# Patient Record
Sex: MALE | Race: WHITE | NOT HISPANIC OR LATINO | Employment: OTHER | URBAN - METROPOLITAN AREA
[De-identification: names, ages, dates, MRNs, and addresses within clinical notes are randomized per-mention and may not be internally consistent; named-entity substitution may affect disease eponyms.]

---

## 2017-01-25 ENCOUNTER — ALLSCRIPTS OFFICE VISIT (OUTPATIENT)
Dept: OTHER | Facility: OTHER | Age: 52
End: 2017-01-25

## 2017-06-08 ENCOUNTER — APPOINTMENT (EMERGENCY)
Dept: CT IMAGING | Facility: HOSPITAL | Age: 52
End: 2017-06-08
Payer: COMMERCIAL

## 2017-06-08 ENCOUNTER — HOSPITAL ENCOUNTER (EMERGENCY)
Facility: HOSPITAL | Age: 52
Discharge: HOME | End: 2017-06-08
Attending: EMERGENCY MEDICINE
Payer: COMMERCIAL

## 2017-06-08 ENCOUNTER — APPOINTMENT (EMERGENCY)
Dept: RADIOLOGY | Facility: HOSPITAL | Age: 52
End: 2017-06-08
Payer: COMMERCIAL

## 2017-06-08 VITALS
TEMPERATURE: 98.3 F | OXYGEN SATURATION: 98 % | DIASTOLIC BLOOD PRESSURE: 94 MMHG | WEIGHT: 213.19 LBS | BODY MASS INDEX: 28.88 KG/M2 | RESPIRATION RATE: 17 BRPM | HEART RATE: 96 BPM | SYSTOLIC BLOOD PRESSURE: 149 MMHG | HEIGHT: 72 IN

## 2017-06-08 DIAGNOSIS — S90.02XA CONTUSION OF LEFT ANKLE: ICD-10-CM

## 2017-06-08 DIAGNOSIS — S43.402A SPRAIN OF LEFT SHOULDER: ICD-10-CM
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
## 2017-06-08 DIAGNOSIS — S80.02XA CONTUSION OF LEFT KNEE: ICD-10-CM

## 2017-06-08 PROCEDURE — 73030 X-RAY EXAM OF SHOULDER: CPT

## 2017-06-08 PROCEDURE — 73564 X-RAY EXAM KNEE 4 OR MORE: CPT

## 2017-06-08 PROCEDURE — 99284 EMERGENCY DEPT VISIT MOD MDM: CPT

## 2017-06-08 PROCEDURE — 70450 CT HEAD/BRAIN W/O DYE: CPT

## 2017-06-08 PROCEDURE — 72125 CT NECK SPINE W/O DYE: CPT

## 2017-06-08 PROCEDURE — 73610 X-RAY EXAM OF ANKLE: CPT

## 2017-06-08 RX ORDER — LISINOPRIL AND HYDROCHLOROTHIAZIDE 12.5; 1 MG/1; MG/1
1 TABLET ORAL DAILY
COMMUNITY
End: 2019-07-02 | Stop reason: ALTCHOICE

## 2017-06-08 RX ORDER — TRAMADOL HYDROCHLORIDE 300 MG/1
300 TABLET, EXTENDED RELEASE ORAL DAILY
COMMUNITY

## 2017-06-08 RX ORDER — METAXALONE 800 MG/1
800 TABLET ORAL 3 TIMES DAILY
COMMUNITY

## 2017-06-08 RX ORDER — RABEPRAZOLE SODIUM 20 MG/1
20 TABLET, DELAYED RELEASE ORAL DAILY
COMMUNITY
End: 2019-07-02 | Stop reason: ALTCHOICE

## 2017-06-08 RX ORDER — ATORVASTATIN CALCIUM 10 MG/1
10 TABLET, FILM COATED ORAL DAILY
COMMUNITY
End: 2019-07-02 | Stop reason: SINTOL

## 2017-06-08 RX ORDER — ACETAMINOPHEN 325 MG/1
650 TABLET ORAL ONCE
Status: COMPLETED | OUTPATIENT
Start: 2017-06-08 | End: 2017-06-08

## 2017-06-08 RX ORDER — CELECOXIB 200 MG/1
200 CAPSULE ORAL DAILY
COMMUNITY

## 2017-06-08 RX ORDER — GABAPENTIN 600 MG/1
1200 TABLET ORAL 3 TIMES DAILY
COMMUNITY
End: 2018-03-15 | Stop reason: SDUPTHER

## 2017-06-08 RX ADMIN — ACETAMINOPHEN 650 MG: 325 TABLET ORAL at 10:14

## 2017-08-01 ENCOUNTER — TRANSCRIBE ORDERS (OUTPATIENT)
Dept: ADMINISTRATIVE | Facility: HOSPITAL | Age: 52
End: 2017-08-01

## 2017-08-01 DIAGNOSIS — R52 PAIN: Primary | ICD-10-CM

## 2017-08-10 ENCOUNTER — HOSPITAL ENCOUNTER (OUTPATIENT)
Dept: MRI IMAGING | Facility: HOSPITAL | Age: 52
Discharge: HOME | End: 2017-08-10
Attending: PHYSICAL MEDICINE & REHABILITATION
Payer: COMMERCIAL

## 2017-08-10 DIAGNOSIS — R52 PAIN: ICD-10-CM

## 2017-08-10 PROCEDURE — 73721 MRI JNT OF LWR EXTRE W/O DYE: CPT

## 2017-08-10 PROCEDURE — 70551 MRI BRAIN STEM W/O DYE: CPT

## 2018-01-12 VITALS
SYSTOLIC BLOOD PRESSURE: 122 MMHG | HEIGHT: 71 IN | HEART RATE: 80 BPM | BODY MASS INDEX: 26.18 KG/M2 | RESPIRATION RATE: 16 BRPM | WEIGHT: 187 LBS | DIASTOLIC BLOOD PRESSURE: 82 MMHG

## 2018-01-18 ENCOUNTER — GENERIC CONVERSION - ENCOUNTER (OUTPATIENT)
Dept: OTHER | Facility: OTHER | Age: 53
End: 2018-01-18

## 2018-01-19 ENCOUNTER — GENERIC CONVERSION - ENCOUNTER (OUTPATIENT)
Dept: NEUROLOGY | Facility: CLINIC | Age: 53
End: 2018-01-19

## 2018-01-24 VITALS
BODY MASS INDEX: 28.48 KG/M2 | HEART RATE: 92 BPM | SYSTOLIC BLOOD PRESSURE: 152 MMHG | WEIGHT: 210 LBS | DIASTOLIC BLOOD PRESSURE: 92 MMHG

## 2018-03-14 ENCOUNTER — TELEPHONE (OUTPATIENT)
Dept: NEUROLOGY | Facility: CLINIC | Age: 53
End: 2018-03-14

## 2018-03-14 NOTE — TELEPHONE ENCOUNTER
spoke to wife, asked her to have her  call to verify if we are now having gabapentin switched to [facility]

## 2018-03-15 DIAGNOSIS — G60.9 IDIOPATHIC PERIPHERAL NEUROPATHY: Primary | ICD-10-CM

## 2018-03-15 RX ORDER — GABAPENTIN 600 MG/1
TABLET ORAL
Qty: 540 TABLET | Refills: 1 | Status: SHIPPED | OUTPATIENT
Start: 2018-03-15 | End: 2018-07-16 | Stop reason: SDUPTHER

## 2018-07-16 DIAGNOSIS — G60.9 IDIOPATHIC PERIPHERAL NEUROPATHY: ICD-10-CM

## 2018-07-17 RX ORDER — GABAPENTIN 600 MG/1
TABLET ORAL
Qty: 540 TABLET | Refills: 3 | Status: SHIPPED | OUTPATIENT
Start: 2018-07-17 | End: 2019-06-11 | Stop reason: SDUPTHER

## 2019-06-11 DIAGNOSIS — G60.9 IDIOPATHIC PERIPHERAL NEUROPATHY: ICD-10-CM

## 2019-06-11 RX ORDER — GABAPENTIN 600 MG/1
TABLET ORAL
Qty: 540 TABLET | Refills: 3 | Status: SHIPPED | OUTPATIENT
Start: 2019-06-11 | End: 2019-07-02 | Stop reason: SDUPTHER

## 2019-06-11 RX ORDER — GABAPENTIN 600 MG/1
1200 TABLET ORAL 3 TIMES DAILY
Qty: 540 TABLET | Refills: 3 | Status: SHIPPED | OUTPATIENT
Start: 2019-06-11 | End: 2020-06-08

## 2019-07-02 ENCOUNTER — OFFICE VISIT (OUTPATIENT)
Dept: NEUROLOGY | Facility: CLINIC | Age: 54
End: 2019-07-02
Payer: COMMERCIAL

## 2019-07-02 VITALS
HEART RATE: 90 BPM | HEIGHT: 71 IN | DIASTOLIC BLOOD PRESSURE: 84 MMHG | SYSTOLIC BLOOD PRESSURE: 130 MMHG | BODY MASS INDEX: 29.79 KG/M2 | WEIGHT: 212.8 LBS

## 2019-07-02 DIAGNOSIS — G60.9 IDIOPATHIC PERIPHERAL NEUROPATHY: Primary | ICD-10-CM

## 2019-07-02 PROCEDURE — 99213 OFFICE O/P EST LOW 20 MIN: CPT | Performed by: PSYCHIATRY & NEUROLOGY

## 2019-07-02 RX ORDER — AMITRIPTYLINE HYDROCHLORIDE 25 MG/1
25 TABLET, FILM COATED ORAL
COMMUNITY
End: 2020-03-31 | Stop reason: ALTCHOICE

## 2019-07-02 RX ORDER — FREMANEZUMAB-VFRM 225 MG/1.5ML
225 INJECTION SUBCUTANEOUS
COMMUNITY
Start: 2019-07-01

## 2019-07-02 RX ORDER — AMLODIPINE BESYLATE 2.5 MG/1
2.5 TABLET ORAL DAILY
COMMUNITY
End: 2022-03-22

## 2019-07-02 RX ORDER — OMEPRAZOLE 20 MG/1
1 CAPSULE, DELAYED RELEASE ORAL 2 TIMES DAILY
COMMUNITY
Start: 2019-06-23 | End: 2021-03-26

## 2019-07-02 RX ORDER — SOLIFENACIN SUCCINATE 5 MG/1
5 TABLET, FILM COATED ORAL DAILY
COMMUNITY

## 2019-07-02 RX ORDER — ROSUVASTATIN CALCIUM 5 MG/1
1 TABLET, COATED ORAL DAILY
COMMUNITY
Start: 2019-06-30

## 2019-07-02 NOTE — PROGRESS NOTES
Johanny Wyman is a 49 y o  male returns in follow-up today with history of peripheral neuropathy    Assessment:  1  Idiopathic peripheral neuropathy        Plan:  Continue gabapentin  Follow-up 6 months    Discussion:  Vic's symptoms of peripheral neuropathy remain under adequate control with gabapentin 1200 mg 3 times daily  He will continue with this and I will see him back in follow-up in 6 months      Subjective:    JUVENAL  Virginia Ortiz returns in follow-up today  It has been about a year and half since he was here last   He remains on gabapentin 1200 mg 3 times daily and finds that he gets adequate relief of his neuropathic pain symptoms  He is aware of numbness in his feet  His balance issues are multifactorial including a lot of issues with his knees and ankles in addition to the neuropathy  He does ambulate with a straight cane  He reports that he continues to be treated for concussion that he suffered in 2017 with residual dizziness and headaches through [facility]  He recently had some knee surgery      Past Medical History:   Diagnosis Date    Hyperlipidemia     Hypertension        Family History:  Family History   Problem Relation Age of Onset    Hypertension Mother     Hypertension Father     Kidney cancer Father        Past Surgical History:  Past Surgical History:   Procedure Laterality Date    ANKLE LIGAMENT RECONSTRUCTION Right 2008    ARTHROSCOPY KNEE Left 05/06/2019    x2 prior tear 2018    KNEE SURGERY Right     ROTATOR CUFF REPAIR Left 2017    4 anchors    ULNAR NERVE TRANSPOSITION Bilateral 2010    WRIST SURGERY Right 2017/18       Social History:   reports that he has never smoked  He has never used smokeless tobacco  He reports that he drinks about 2 0 standard drinks of alcohol per week  He reports that he does not use drugs      Allergies:  Atorvastatin; Lisinopril; and Seasonal ic  [cholestatin]      Current Outpatient Medications:     AJOVY 225 MG/1 5ML SOSY, Inject 225 mg under the skin every 30 (thirty) days, Disp: , Rfl:     amitriptyline (ELAVIL) 25 mg tablet, Take 25 mg by mouth daily at bedtime, Disp: , Rfl:     amLODIPine (NORVASC) 2 5 mg tablet, Take 2 5 mg by mouth daily, Disp: , Rfl:     celecoxib (CeleBREX) 200 mg capsule, Take 200 mg by mouth daily, Disp: , Rfl:     Diclofenac Sodium 1 6 % GEL, Place on the skin as needed, Disp: , Rfl:     gabapentin (NEURONTIN) 600 MG tablet, Take 2 tablets (1,200 mg total) by mouth 3 (three) times a day, Disp: 540 tablet, Rfl: 3    metaxalone (SKELAXIN) 800 mg tablet, Take 800 mg by mouth 3 (three) times a day, Disp: , Rfl:     omeprazole (PriLOSEC) 20 mg delayed release capsule, Take 1 capsule by mouth 2 (two) times a day, Disp: , Rfl:     rosuvastatin (CRESTOR) 5 mg tablet, Take 1 tablet by mouth daily, Disp: , Rfl:     solifenacin (VESICARE) 5 mg tablet, Take 5 mg by mouth daily, Disp: , Rfl:     traMADol (ULTRAM-ER) 300 MG 24 hr tablet, Take 300 mg by mouth daily, Disp: , Rfl:     I have reviewed the past medical, social and family history, current medications, allergies, vitals, review of systems and updated this information as appropriate today     Objective:    Vitals:  Blood pressure 130/84, pulse 90, height 5' 11" (1 803 m), weight 96 5 kg (212 lb 12 8 oz)  Physical Exam    Neurological Exam  GENERAL:  Well-developed well-nourished man in no acute distress  HEENT/NECK: Head is atraumatic normocephalic, neck is supple  NEUROLOGIC:  Mental Status: Awake and alert without aphasia  Cranial Nerves: Extraocular movements are full  Face is symmetrical  Motor:  No drift is noted on arm extension  Coordination:  Finger-to-nose testing is performed accurately  Romberg is negative  He ambulates with an antalgic gait pattern, stable with a straight cane            ROS:    Review of Systems   Constitutional: Negative  Negative for appetite change and fever  HENT: Negative    Negative for hearing loss, tinnitus, trouble swallowing and voice change  Eyes: Negative  Negative for photophobia and pain  Respiratory: Negative  Negative for shortness of breath  Cardiovascular: Negative  Negative for palpitations  Gastrointestinal: Negative  Negative for nausea and vomiting  Endocrine: Negative  Negative for cold intolerance and heat intolerance  Genitourinary: Negative  Negative for dysuria, frequency and urgency  Musculoskeletal: Positive for arthralgias, back pain, gait problem, myalgias (right thigh), neck pain and neck stiffness  Skin: Negative  Negative for rash  Neurological: Positive for dizziness, numbness (BL hands and feet) and headaches  Negative for tremors, seizures, syncope, facial asymmetry, speech difficulty, weakness and light-headedness  Hematological: Negative  Does not bruise/bleed easily  Psychiatric/Behavioral: Negative  Negative for confusion, hallucinations and sleep disturbance  All other systems reviewed and are negative

## 2019-07-11 ENCOUNTER — TELEPHONE (OUTPATIENT)
Dept: NEUROLOGY | Facility: CLINIC | Age: 54
End: 2019-07-11

## 2019-07-11 NOTE — TELEPHONE ENCOUNTER
Received medical records request from Remy Agrawal asking for patients medical records      Send to:   [id number] [facility] [place]    Faxed to [phone]: 07/11/19

## 2020-01-16 ENCOUNTER — TRANSCRIBE ORDERS (OUTPATIENT)
Dept: ADMINISTRATIVE | Facility: HOSPITAL | Age: 55
End: 2020-01-16

## 2020-01-16 DIAGNOSIS — J01.00 ACUTE MAXILLARY SINUSITIS, RECURRENCE NOT SPECIFIED: Primary | ICD-10-CM

## 2020-01-23 ENCOUNTER — TELEPHONE (OUTPATIENT)
Dept: NEUROLOGY | Facility: CLINIC | Age: 55
End: 2020-01-23

## 2020-01-23 ENCOUNTER — HOSPITAL ENCOUNTER (OUTPATIENT)
Dept: RADIOLOGY | Facility: HOSPITAL | Age: 55
Discharge: HOME | End: 2020-01-23
Payer: COMMERCIAL

## 2020-01-23 DIAGNOSIS — J01.00 ACUTE MAXILLARY SINUSITIS, RECURRENCE NOT SPECIFIED: ICD-10-CM

## 2020-01-23 PROCEDURE — 70486 CT MAXILLOFACIAL W/O DYE: CPT

## 2020-01-23 NOTE — TELEPHONE ENCOUNTER
Spoke with patient wife offered appt for 3-30-20 with Dr Prakash Dominguez per wife pt would like later appt pt will call back to reschedule apptt   ----- Message from Karley Brooks, Jai Santos Donora sent at 1/6/2020  2:09 PM EST -----  Regarding: FW: Visit Follow-Up Question  Contact: [phone]      ----- Message -----  From: Valerie Escobar  Sent: 1/6/2020   1:34 PM EST  To: Neurology BEHAVIORAL MEDICINE AT [facility]  Subject: Visit Follow-Up Question                         I had a visit scheduled for today ( 1-6-2020)  at 1:05 pm  When I arrived for the appointment I was told that I had cancelled it  I did not receive a phone call nor did I cancel the appointment  I had surgery to my right wrist on 12-6-2019 removing 3 bones and I am still in a cast  I have therapies and doctor visits scheduled from now until March  If someone can call to reschedule for some time at the end of March, that would be much appreciated      Thank you for your time,  Madeleine Orantes

## 2020-03-12 ENCOUNTER — TRANSCRIBE ORDERS (OUTPATIENT)
Dept: ADMINISTRATIVE | Facility: HOSPITAL | Age: 55
End: 2020-03-12

## 2020-03-12 DIAGNOSIS — G43.909 MIGRAINE WITHOUT STATUS MIGRAINOSUS, NOT INTRACTABLE, UNSPECIFIED MIGRAINE TYPE: Primary | ICD-10-CM

## 2020-03-31 ENCOUNTER — TELEMEDICINE (OUTPATIENT)
Dept: NEUROLOGY | Facility: CLINIC | Age: 55
End: 2020-03-31
Payer: COMMERCIAL

## 2020-03-31 DIAGNOSIS — G60.9 IDIOPATHIC PERIPHERAL NEUROPATHY: Primary | ICD-10-CM

## 2020-03-31 PROCEDURE — 99213 OFFICE O/P EST LOW 20 MIN: CPT | Performed by: PSYCHIATRY & NEUROLOGY

## 2020-03-31 RX ORDER — FLUTICASONE PROPIONATE 50 MCG
SPRAY, SUSPENSION (ML) NASAL
COMMUNITY
End: 2022-03-22

## 2020-03-31 RX ORDER — LORATADINE AND PSEUDOEPHEDRINE 10; 240 MG/1; MG/1
1 TABLET, EXTENDED RELEASE ORAL DAILY
COMMUNITY

## 2020-03-31 RX ORDER — AMOXICILLIN AND CLAVULANATE POTASSIUM 875; 125 MG/1; MG/1
TABLET, FILM COATED ORAL 2 TIMES DAILY
COMMUNITY
Start: 2020-03-22 | End: 2020-10-19 | Stop reason: ALTCHOICE

## 2020-03-31 NOTE — PROGRESS NOTES
Virtual Regular Visit    Problem List Items Addressed This Visit     None      Visit Diagnoses     Idiopathic peripheral neuropathy    -  Primary        Plan:  Continue gabapentin  Follow-up 6 months    Discussion:    Vic's symptoms of neuropathic pain remain under good control with gabapentin  He will continue this medication  He has noted some symptoms of sensory ataxia and will avoid activities that put him at risk for falling  If symptoms worsen consider physical therapy  I will see him back in follow-up in 6 months    Reason for visit is peripheral neuropathy    Encounter provider Courtney Burnette MD    Provider located at [place] [address]      Recent Visits  No visits were found meeting these conditions  Showing recent visits within past 7 days and meeting all other requirements     Today's Visits  Date Type Provider Dept   03/31/20 Telemedicine Courtney Burnette MD [address] [place] [address] today's visits and meeting all other requirements     Future Appointments  No visits were found meeting these conditions  Showing future appointments within next 150 days and meeting all other requirements        The patient was identified by name and date of birth  Idalia Cantrell was informed that this is a telemedicine visit and that the visit is being conducted through [employer] in lieu of office visit in the face of ongoing [facility] viral epidemic  Patient understands that this format is not completely HIPPA compliant, however I am sitting in my office alone with the door closed and patient is in a comfortable environment to speak with me about current health issues  He acknowledged consent and understanding of privacy and security of the video platform  The patient has agreed to participate and understands they can discontinue the visit at any time      Patient is aware this is a [facility] service  Subjective  Fabio Reyes is a 49 y o  male reports overall things have been fairly stable since here last   He denies any new symptoms of neuropathic pain in his feet  He remains on gabapentin and tolerates this well  He states he recently had his right wrist operated on and some of the bones were fused  He states he has been having a slow recovery but has reported some improvement in pain despite decreased range of motion of the wrist   He reports that he has noticed that when he is in the shower with his eyes closed his balance feels a little bit off  He has not had any falls  He was placed on Ajovy for posttraumatic headaches and has noted some improvement        Past Medical History:   Diagnosis Date    Hyperlipidemia     Hypertension        Past Surgical History:   Procedure Laterality Date    ANKLE LIGAMENT RECONSTRUCTION Right 2008    ARTHROSCOPY KNEE Left 05/06/2019    x2 prior tear 2018    KNEE SURGERY Right     ROTATOR CUFF REPAIR Left 2017    4 anchors    ULNAR NERVE TRANSPOSITION Bilateral 2010    WRIST FUSION Right 12/06/2019    WRIST SURGERY Right 2017/18       Current Outpatient Medications   Medication Sig Dispense Refill    AJOVY 225 MG/1 5ML SOSY Inject 225 mg under the skin every 30 (thirty) days      amLODIPine (NORVASC) 2 5 mg tablet Take 2 5 mg by mouth daily      celecoxib (CeleBREX) 200 mg capsule Take 200 mg by mouth daily      Diclofenac Sodium 1 6 % GEL Place on the skin as needed      gabapentin (NEURONTIN) 600 MG tablet Take 2 tablets (1,200 mg total) by mouth 3 (three) times a day 540 tablet 3    metaxalone (SKELAXIN) 800 mg tablet Take 800 mg by mouth 3 (three) times a day      omeprazole (PriLOSEC) 20 mg delayed release capsule Take 1 capsule by mouth 2 (two) times a day      rosuvastatin (CRESTOR) 5 mg tablet Take 1 tablet by mouth daily      solifenacin (VESICARE) 5 mg tablet Take 5 mg by mouth daily      traMADol (ULTRAM-ER) 300 MG 24 hr tablet Take 300 mg by mouth daily      amoxicillin-clavulanate (AUGMENTIN) 875-125 mg per tablet 2 (two) times a day      fluticasone (FLONASE) 50 mcg/act nasal spray fluticasone propionate 50 mcg/actuation nasal spray,suspension   SPRAY 2 SPRAYS INTO EACH NOSTRIL EVERY DAY      loratadine-pseudoephedrine (CLARITIN-D 24-HOUR)  mg per 24 hr tablet Take 1 tablet by mouth daily       No current facility-administered medications for this visit  Allergies   Allergen Reactions    Atorvastatin      disorientation    Lisinopril Cough    Seasonal Ic  [Cholestatin]      Annotation - [id number]: *HAYFEVER*       Review of Systems   Constitutional: Negative  Negative for appetite change, fatigue and fever  HENT: Negative  Negative for hearing loss, tinnitus, trouble swallowing and voice change  Eyes: Positive for photophobia  Negative for pain  Respiratory: Negative  Negative for shortness of breath  Cardiovascular: Negative  Negative for palpitations  Gastrointestinal: Negative  Negative for nausea and vomiting  Endocrine: Negative  Negative for cold intolerance  Genitourinary: Negative  Negative for dysuria, frequency and urgency  Musculoskeletal: Positive for arthralgias (Knee pain), back pain, gait problem, neck pain and neck stiffness  Negative for myalgias  Skin: Negative  Negative for rash  Neurological: Positive for dizziness, weakness (hands), numbness and headaches  Negative for tremors, seizures, syncope, facial asymmetry, speech difficulty and light-headedness  Hematological: Negative  Does not bruise/bleed easily  Psychiatric/Behavioral: Positive for confusion, decreased concentration and sleep disturbance  Negative for hallucinations         Physical Exam   GENERAL:  Well-developed well-nourished man in no acute distress  HEENT/NECK: Head is atraumatic normocephalic, neck is supple  NEUROLOGIC:  Mental Status: Awake and alert without aphasia  Cranial Nerves: Extraocular movements are full  Face is symmetrical  Motor:  No drift is noted on arm extension  Coordination:  Finger-to-nose testing is performed accurately  Romberg reveals mild sway with eyes closed  I spent 15 minutes with the patient during this visit

## 2020-04-15 ENCOUNTER — TELEPHONE (OUTPATIENT)
Dept: MRI IMAGING | Facility: HOSPITAL | Age: 55
End: 2020-04-15

## 2020-06-07 DIAGNOSIS — G60.9 IDIOPATHIC PERIPHERAL NEUROPATHY: ICD-10-CM

## 2020-06-08 RX ORDER — GABAPENTIN 600 MG/1
TABLET ORAL
Qty: 540 TABLET | Refills: 3 | Status: SHIPPED | OUTPATIENT
Start: 2020-06-08 | End: 2021-04-01

## 2020-07-06 ENCOUNTER — TRANSCRIBE ORDERS (OUTPATIENT)
Dept: ADMINISTRATIVE | Facility: HOSPITAL | Age: 55
End: 2020-07-06

## 2020-07-06 ENCOUNTER — HOSPITAL ENCOUNTER (OUTPATIENT)
Dept: MRI IMAGING | Facility: HOSPITAL | Age: 55
Discharge: HOME | End: 2020-07-06
Payer: COMMERCIAL

## 2020-07-06 ENCOUNTER — APPOINTMENT (OUTPATIENT)
Dept: LAB | Facility: HOSPITAL | Age: 55
End: 2020-07-06
Attending: PSYCHIATRY & NEUROLOGY
Payer: COMMERCIAL

## 2020-07-06 DIAGNOSIS — R25.1 DYSPHONIA OF ORGANIC TREMOR: Primary | ICD-10-CM

## 2020-07-06 DIAGNOSIS — R49.0 DYSPHONIA OF ORGANIC TREMOR: ICD-10-CM

## 2020-07-06 DIAGNOSIS — G43.909 MIGRAINE WITHOUT STATUS MIGRAINOSUS, NOT INTRACTABLE, UNSPECIFIED MIGRAINE TYPE: ICD-10-CM

## 2020-07-06 DIAGNOSIS — R25.1 DYSPHONIA OF ORGANIC TREMOR: ICD-10-CM

## 2020-07-06 DIAGNOSIS — R49.0 DYSPHONIA OF ORGANIC TREMOR: Primary | ICD-10-CM

## 2020-07-06 PROCEDURE — 36415 COLL VENOUS BLD VENIPUNCTURE: CPT

## 2020-07-06 PROCEDURE — 70551 MRI BRAIN STEM W/O DYE: CPT

## 2020-07-08 ENCOUNTER — TELEPHONE (OUTPATIENT)
Dept: NEUROLOGY | Facility: CLINIC | Age: 55
End: 2020-07-08

## 2020-07-08 NOTE — TELEPHONE ENCOUNTER
Latoya at lab outreach department in [place] called wanting to make us aware that the labs resulted in the system on 7/6 are NOT for this patient and to please disregard them   They have been edited to "Suspect Registration/ Mislabeled Specimen"

## 2020-09-07 ENCOUNTER — HOSPITAL ENCOUNTER (EMERGENCY)
Facility: HOSPITAL | Age: 55
Discharge: HOME | End: 2020-09-07
Attending: EMERGENCY MEDICINE | Admitting: EMERGENCY MEDICINE
Payer: COMMERCIAL

## 2020-09-07 ENCOUNTER — APPOINTMENT (EMERGENCY)
Dept: RADIOLOGY | Facility: HOSPITAL | Age: 55
End: 2020-09-07
Payer: COMMERCIAL

## 2020-09-07 VITALS
HEIGHT: 72 IN | WEIGHT: 210.32 LBS | RESPIRATION RATE: 15 BRPM | HEART RATE: 86 BPM | BODY MASS INDEX: 28.49 KG/M2 | TEMPERATURE: 98.1 F | DIASTOLIC BLOOD PRESSURE: 87 MMHG | SYSTOLIC BLOOD PRESSURE: 141 MMHG | OXYGEN SATURATION: 97 %

## 2020-09-07 DIAGNOSIS — R07.89 ANTERIOR CHEST WALL PAIN: Primary | ICD-10-CM

## 2020-09-07 LAB
ALBUMIN SERPL BCP-MCNC: 4.1 G/DL (ref 3.5–5)
ALP SERPL-CCNC: 86 U/L (ref 46–116)
ALT SERPL W P-5'-P-CCNC: 39 U/L (ref 12–78)
ANION GAP SERPL CALCULATED.3IONS-SCNC: 8 MMOL/L (ref 4–13)
AST SERPL W P-5'-P-CCNC: 22 U/L (ref 5–45)
BASOPHILS # BLD AUTO: 0.01 THOUSANDS/ΜL (ref 0–0.1)
BASOPHILS NFR BLD AUTO: 0 % (ref 0–1)
BILIRUB DIRECT SERPL-MCNC: 0.09 MG/DL (ref 0–0.2)
BILIRUB SERPL-MCNC: 0.4 MG/DL (ref 0.2–1)
BUN SERPL-MCNC: 6 MG/DL (ref 5–25)
CALCIUM SERPL-MCNC: 9.4 MG/DL (ref 8.3–10.1)
CHLORIDE SERPL-SCNC: 103 MMOL/L (ref 100–108)
CO2 SERPL-SCNC: 29 MMOL/L (ref 21–32)
CREAT SERPL-MCNC: 0.94 MG/DL (ref 0.6–1.3)
D DIMER PPP FEU-MCNC: <0.27 UG/ML FEU
EOSINOPHIL # BLD AUTO: 0.05 THOUSAND/ΜL (ref 0–0.61)
EOSINOPHIL NFR BLD AUTO: 1 % (ref 0–6)
ERYTHROCYTE [DISTWIDTH] IN BLOOD BY AUTOMATED COUNT: 12.7 % (ref 11.6–15.1)
GFR SERPL CREATININE-BSD FRML MDRD: 92 ML/MIN/1.73SQ M
GLUCOSE SERPL-MCNC: 135 MG/DL (ref 65–140)
HCT VFR BLD AUTO: 45.2 % (ref 36.5–49.3)
HGB BLD-MCNC: 15.4 G/DL (ref 12–17)
IMM GRANULOCYTES # BLD AUTO: 0.01 THOUSAND/UL (ref 0–0.2)
IMM GRANULOCYTES NFR BLD AUTO: 0 % (ref 0–2)
LIPASE SERPL-CCNC: 73 U/L (ref 73–393)
LYMPHOCYTES # BLD AUTO: 1.13 THOUSANDS/ΜL (ref 0.6–4.47)
LYMPHOCYTES NFR BLD AUTO: 23 % (ref 14–44)
MAGNESIUM SERPL-MCNC: 2.2 MG/DL (ref 1.6–2.6)
MCH RBC QN AUTO: 31.6 PG (ref 26.8–34.3)
MCHC RBC AUTO-ENTMCNC: 34.1 G/DL (ref 31.4–37.4)
MCV RBC AUTO: 93 FL (ref 82–98)
MONOCYTES # BLD AUTO: 0.39 THOUSAND/ΜL (ref 0.17–1.22)
MONOCYTES NFR BLD AUTO: 8 % (ref 4–12)
NEUTROPHILS # BLD AUTO: 3.29 THOUSANDS/ΜL (ref 1.85–7.62)
NEUTS SEG NFR BLD AUTO: 68 % (ref 43–75)
NRBC BLD AUTO-RTO: 0 /100 WBCS
PLATELET # BLD AUTO: 274 THOUSANDS/UL (ref 149–390)
PMV BLD AUTO: 10.2 FL (ref 8.9–12.7)
POTASSIUM SERPL-SCNC: 4.2 MMOL/L (ref 3.5–5.3)
PROT SERPL-MCNC: 7.8 G/DL (ref 6.4–8.2)
RBC # BLD AUTO: 4.87 MILLION/UL (ref 3.88–5.62)
SODIUM SERPL-SCNC: 140 MMOL/L (ref 136–145)
TROPONIN I SERPL-MCNC: <0.02 NG/ML
TROPONIN I SERPL-MCNC: <0.02 NG/ML
WBC # BLD AUTO: 4.88 THOUSAND/UL (ref 4.31–10.16)

## 2020-09-07 PROCEDURE — 83735 ASSAY OF MAGNESIUM: CPT | Performed by: EMERGENCY MEDICINE

## 2020-09-07 PROCEDURE — 85379 FIBRIN DEGRADATION QUANT: CPT | Performed by: EMERGENCY MEDICINE

## 2020-09-07 PROCEDURE — 84484 ASSAY OF TROPONIN QUANT: CPT | Performed by: EMERGENCY MEDICINE

## 2020-09-07 PROCEDURE — 71046 X-RAY EXAM CHEST 2 VIEWS: CPT

## 2020-09-07 PROCEDURE — 96374 THER/PROPH/DIAG INJ IV PUSH: CPT

## 2020-09-07 PROCEDURE — 85025 COMPLETE CBC W/AUTO DIFF WBC: CPT | Performed by: EMERGENCY MEDICINE

## 2020-09-07 PROCEDURE — 83690 ASSAY OF LIPASE: CPT | Performed by: EMERGENCY MEDICINE

## 2020-09-07 PROCEDURE — 99285 EMERGENCY DEPT VISIT HI MDM: CPT

## 2020-09-07 PROCEDURE — 80048 BASIC METABOLIC PNL TOTAL CA: CPT | Performed by: EMERGENCY MEDICINE

## 2020-09-07 PROCEDURE — 99285 EMERGENCY DEPT VISIT HI MDM: CPT | Performed by: EMERGENCY MEDICINE

## 2020-09-07 PROCEDURE — 36415 COLL VENOUS BLD VENIPUNCTURE: CPT | Performed by: EMERGENCY MEDICINE

## 2020-09-07 PROCEDURE — 93005 ELECTROCARDIOGRAM TRACING: CPT

## 2020-09-07 PROCEDURE — 80076 HEPATIC FUNCTION PANEL: CPT | Performed by: EMERGENCY MEDICINE

## 2020-09-07 RX ORDER — NAPROXEN 500 MG/1
500 TABLET ORAL EVERY 12 HOURS PRN
Qty: 20 TABLET | Refills: 0 | Status: SHIPPED | OUTPATIENT
Start: 2020-09-07 | End: 2021-12-22 | Stop reason: ALTCHOICE

## 2020-09-07 RX ORDER — KETOROLAC TROMETHAMINE 30 MG/ML
15 INJECTION, SOLUTION INTRAMUSCULAR; INTRAVENOUS ONCE
Status: COMPLETED | OUTPATIENT
Start: 2020-09-07 | End: 2020-09-07

## 2020-09-07 RX ORDER — LIDOCAINE 50 MG/G
1 PATCH TOPICAL ONCE
Status: DISCONTINUED | OUTPATIENT
Start: 2020-09-07 | End: 2020-09-07 | Stop reason: HOSPADM

## 2020-09-07 RX ADMIN — KETOROLAC TROMETHAMINE 15 MG: 30 INJECTION, SOLUTION INTRAMUSCULAR at 17:08

## 2020-09-07 RX ADMIN — LIDOCAINE 5% 1 PATCH: 700 PATCH TOPICAL at 17:07

## 2020-09-07 NOTE — ED NOTES
Pt denies any CP at this time  States had CP yesterday when washing car, pain worse when pressing on chest and is under b/l ribs  Denies SOB       Aleida Mendoza RN  09/07/20 [id number]

## 2020-09-07 NOTE — ED PROVIDER NOTES
History  Chief Complaint   Patient presents with    Chest Pain     Pt presents with b/l chest pain under breast that hurt more when he pushes on it      Patient is a 64-year-old male with past medical history of hypertension, hyperlipidemia, chronic knee pain, presents to the emergency department for bilateral lower chest wall pain that started yesterday  Patient reports he feels soreness and tenderness in his bilateral lower chest, worsened when he pushes on his ribs in that region  He reports having similar pain many years ago and had an extensive workup, ultimately being negative any was diagnosed with costochondritis  Patient states the pain does not radiate  Denies worsening with exertion  He denies any recent heavy lifting or strenuous activity  He does report doing yard work yesterday but does not recall any injury  He denies any associated symptoms such as fever, chills, diaphoresis, headache, dizziness or near syncope, cough or URI symptoms, hemoptysis, palpitations, dyspnea, abdominal pain or distention, nausea, vomiting, diarrhea, constipation, blood per rectum or melena, dysuria, change in urinary frequency, hematuria, flank pain, skin rash or color change, extremity swelling or pain, lateralizing extremity weakness or paresthesia other focal neurologic deficits  Denies any personal or family history of cardiac disease  Family history significant for parents with hypertension  Patient denies smoking  Denies any personal family history of venous thromboembolism  No recent prolonged travel or immobilization  History provided by:  Patient   used: No    Chest Pain   Associated symptoms: no abdominal pain, no back pain, no cough, no diaphoresis, no dizziness, no fever, no headache, no nausea, no numbness, no palpitations, no shortness of breath, not vomiting and no weakness        Prior to Admission Medications   Prescriptions Last Dose Informant Patient Reported? Taking? AJOVY 225 MG/1 5ML SOSY   Yes No   Sig: Inject 225 mg under the skin every 30 (thirty) days   Diclofenac Sodium 1 6 % GEL   Yes No   Sig: Place on the skin as needed   amLODIPine (NORVASC) 2 5 mg tablet  Self Yes No   Sig: Take 2 5 mg by mouth daily   amoxicillin-clavulanate (AUGMENTIN) 875-125 mg per tablet   Yes No   Si (two) times a day   celecoxib (CeleBREX) 200 mg capsule   Yes No   Sig: Take 200 mg by mouth daily   fluticasone (FLONASE) 50 mcg/act nasal spray   Yes No   Sig: fluticasone propionate 50 mcg/actuation nasal spray,suspension   SPRAY 2 SPRAYS INTO EACH NOSTRIL EVERY DAY   gabapentin (NEURONTIN) 600 MG tablet   No No   Sig: TAKE 2 TABLETS BY MOUTH 3  TIMES DAILY   loratadine-pseudoephedrine (CLARITIN-D 24-HOUR)  mg per 24 hr tablet   Yes No   Sig: Take 1 tablet by mouth daily   metaxalone (SKELAXIN) 800 mg tablet   Yes No   Sig: Take 800 mg by mouth 3 (three) times a day   omeprazole (PriLOSEC) 20 mg delayed release capsule   Yes No   Sig: Take 1 capsule by mouth 2 (two) times a day   rosuvastatin (CRESTOR) 5 mg tablet   Yes No   Sig: Take 1 tablet by mouth daily   solifenacin (VESICARE) 5 mg tablet  Self Yes No   Sig: Take 5 mg by mouth daily   traMADol (ULTRAM-ER) 300 MG 24 hr tablet   Yes No   Sig: Take 300 mg by mouth daily      Facility-Administered Medications: None       Past Medical History:   Diagnosis Date    Hyperlipidemia     Hypertension        Past Surgical History:   Procedure Laterality Date    ANKLE LIGAMENT RECONSTRUCTION Right     ARTHROSCOPY KNEE Left 05/06/2019    x2 prior tear 2018    KNEE SURGERY Right     ROTATOR CUFF REPAIR Left 2017    4 anchors    ULNAR NERVE TRANSPOSITION Bilateral 2010    WRIST FUSION Right 2019    WRIST SURGERY Right        Family History   Problem Relation Age of Onset    Hypertension Mother     Hypertension Father     Kidney cancer Father      I have reviewed and agree with the history as documented  E-Cigarette/Vaping     E-Cigarette/Vaping Substances     Social History     Tobacco Use    Smoking status: Never Smoker    Smokeless tobacco: Never Used   Substance Use Topics    Alcohol use: Yes     Alcohol/week: 2 0 standard drinks     Types: 2 Cans of beer per week     Frequency: Monthly or less     Comment: socially    Drug use: No       Review of Systems   Constitutional: Negative for chills, diaphoresis and fever  HENT: Negative for congestion, ear pain, rhinorrhea and sore throat  Respiratory: Negative for cough, chest tightness, shortness of breath and wheezing  Cardiovascular: Positive for chest pain  Negative for palpitations  Gastrointestinal: Negative for abdominal distention, abdominal pain, blood in stool, constipation, diarrhea, nausea and vomiting  Genitourinary: Negative for dysuria, flank pain, frequency and hematuria  Musculoskeletal: Negative for back pain, neck pain and neck stiffness  Skin: Negative for color change, pallor, rash and wound  Allergic/Immunologic: Negative for immunocompromised state  Neurological: Negative for dizziness, syncope, weakness, light-headedness, numbness and headaches  Hematological: Negative for adenopathy  Does not bruise/bleed easily  Psychiatric/Behavioral: Negative for confusion and decreased concentration  All other systems reviewed and are negative  Physical Exam  Physical Exam  Vitals signs and nursing note reviewed  Constitutional:       General: He is not in acute distress  Appearance: Normal appearance  He is well-developed  He is not ill-appearing, toxic-appearing or diaphoretic  HENT:      Head: Normocephalic and atraumatic  Right Ear: External ear normal       Left Ear: External ear normal       Mouth/Throat:      Comments: Orpharyngeal exam deferred at this time due to risk of exposure to COVID-19 during current pandemic  Patient has no oropharyngeal complaints    Eyes: Conjunctiva/sclera: Conjunctivae normal       Pupils: Pupils are equal, round, and reactive to light  Neck:      Musculoskeletal: Normal range of motion and neck supple  No muscular tenderness  Vascular: No JVD  Cardiovascular:      Rate and Rhythm: Regular rhythm  Tachycardia present  Pulses: Normal pulses  Heart sounds: Normal heart sounds  No murmur  No friction rub  No gallop  Pulmonary:      Effort: Pulmonary effort is normal  No respiratory distress  Breath sounds: Normal breath sounds  No wheezing or rales  Comments: Diffuse bilateral lower chest wall tenderness  Chest:      Chest wall: Tenderness present  Abdominal:      General: Bowel sounds are normal  There is no distension  Palpations: Abdomen is soft  Tenderness: There is abdominal tenderness  There is no guarding or rebound  Comments: Mild diffuse upper abdominal tenderness  Musculoskeletal: Normal range of motion  General: No swelling or tenderness  Right lower leg: No edema  Left lower leg: No edema  Lymphadenopathy:      Cervical: No cervical adenopathy  Skin:     General: Skin is warm and dry  Coloration: Skin is not pale  Findings: No erythema or rash  Neurological:      General: No focal deficit present  Mental Status: He is alert and oriented to person, place, and time  Sensory: No sensory deficit  Motor: No weakness     Psychiatric:         Mood and Affect: Mood normal          Behavior: Behavior normal          Vital Signs  ED Triage Vitals   Temperature Pulse Respirations Blood Pressure SpO2   09/07/20 1653 09/07/20 1653 09/07/20 1653 09/07/20 1653 09/07/20 1653   98 1 °F (36 7 °C) 103 20 (!) 167/105 99 %      Temp Source Heart Rate Source Patient Position - Orthostatic VS BP Location FiO2 (%)   09/07/20 1653 09/07/20 1653 09/07/20 1653 09/07/20 1653 --   Oral Monitor Sitting Right arm       Pain Score       09/07/20 1708       5 Vitals:    09/07/20 1730 09/07/20 1800 09/07/20 1900 09/07/20 1930   BP: 138/86 144/90 136/84 137/87   BP Location: Right arm Right arm Right arm Right arm   Pulse: 91 94 86 83   Resp: 12 12 12 14   Temp:       TempSrc:       SpO2: 98% 97% 96% 96%   Weight:       Height:           Visual Acuity      ED Medications  Medications   lidocaine (LIDODERM) 5 % patch 1 patch (1 patch Topical Medication Applied 9/7/20 1707)   ketorolac (TORADOL) injection 15 mg (15 mg Intravenous Given 9/7/20 1708)       Diagnostic Studies  Results Reviewed     Procedure Component Value Units Date/Time    Troponin I [[id number]]  (Normal) Collected:  09/07/20 2008    Lab Status:  Final result Specimen:  Blood from Arm, Left Updated:  09/07/20 2037     Troponin I <0 02 ng/mL     Troponin I repeat in 3hrs [[id number]]  (Normal) Collected:  09/07/20 1707    Lab Status:  Final result Specimen:  Blood from Arm, Left Updated:  09/07/20 1734     Troponin I <0 02 ng/mL     Basic metabolic panel [[id number]] Collected:  09/07/20 1707    Lab Status:  Final result Specimen:  Blood from Arm, Left Updated:  09/07/20 1732     Sodium 140 mmol/L      Potassium 4 2 mmol/L      Chloride 103 mmol/L      CO2 29 mmol/L      ANION GAP 8 mmol/L      BUN 6 mg/dL      Creatinine 0 94 mg/dL      Glucose 135 mg/dL      Calcium 9 4 mg/dL      eGFR 92 ml/min/1 73sq m     Narrative:       Zoran guidelines for Chronic Kidney Disease (CKD):     Stage 1 with normal or high GFR (GFR > 90 mL/min/1 73 square meters)    Stage 2 Mild CKD (GFR = 60-89 mL/min/1 73 square meters)    Stage 3A Moderate CKD (GFR = 45-59 mL/min/1 73 square meters)    Stage 3B Moderate CKD (GFR = 30-44 mL/min/1 73 square meters)    Stage 4 Severe CKD (GFR = 15-29 mL/min/1 73 square meters)    Stage 5 End Stage CKD (GFR <15 mL/min/1 73 square meters)  Note: GFR calculation is accurate only with a steady state creatinine    Hepatic function panel [[id number]]  (Normal) Collected:  09/07/20 1707    Lab Status:  Final result Specimen:  Blood from Arm, Left Updated:  09/07/20 1732     Total Bilirubin 0 40 mg/dL      Bilirubin, Direct 0 09 mg/dL      Alkaline Phosphatase 86 U/L      AST 22 U/L      ALT 39 U/L      Total Protein 7 8 g/dL      Albumin 4 1 g/dL     Magnesium [[id number]]  (Normal) Collected:  09/07/20 1707    Lab Status:  Final result Specimen:  Blood from Arm, Left Updated:  09/07/20 1732     Magnesium 2 2 mg/dL     Lipase [[id number]]  (Normal) Collected:  09/07/20 1707    Lab Status:  Final result Specimen:  Blood from Arm, Left Updated:  09/07/20 1732     Lipase 73 u/L     D-Dimer [[id number]]  (Normal) Collected:  09/07/20 1707    Lab Status:  Final result Specimen:  Blood from Arm, Left Updated:  09/07/20 1729     D-Dimer, Quant <0 27 ug/ml FEU     CBC and differential [[id number]] Collected:  09/07/20 1707    Lab Status:  Final result Specimen:  Blood from Arm, Left Updated:  09/07/20 1715     WBC 4 88 Thousand/uL      RBC 4 87 Million/uL      Hemoglobin 15 4 g/dL      Hematocrit 45 2 %      MCV 93 fL      MCH 31 6 pg      MCHC 34 1 g/dL      RDW 12 7 %      MPV 10 2 fL      Platelets 178 Thousands/uL      nRBC 0 /100 WBCs      Neutrophils Relative 68 %      Immat GRANS % 0 %      Lymphocytes Relative 23 %      Monocytes Relative 8 %      Eosinophils Relative 1 %      Basophils Relative 0 %      Neutrophils Absolute 3 29 Thousands/µL      Immature Grans Absolute 0 01 Thousand/uL      Lymphocytes Absolute 1 13 Thousands/µL      Monocytes Absolute 0 39 Thousand/µL      Eosinophils Absolute 0 05 Thousand/µL      Basophils Absolute 0 01 Thousands/µL                  XR chest 2 views   Final Result by Ricardo Fleming MD (09/07 1749)      No acute cardiopulmonary disease              Workstation performed: [id number]                    Procedures  ECG 12 Lead Documentation Only    Date/Time: 9/7/2020 5:06 PM  Performed by: Deepali Cornejo DO  Authorized by: Deepali Cornejo DO ECG reviewed by me, the ED Provider: yes    Patient location:  ED  Previous ECG:     Previous ECG:  Unavailable  Rate:     ECG rate:  108    ECG rate assessment: tachycardic    Rhythm:     Rhythm: sinus tachycardia    Ectopy:     Ectopy: none    QRS:     QRS axis:  Normal    QRS intervals:  Normal  Conduction:     Conduction: normal    ST segments:     ST segments:  Normal  T waves:     T waves: normal      ECG 12 Lead Documentation Only    Date/Time: 9/7/2020 8:10 PM  Performed by: Justin Fairbanks DO  Authorized by: Justin aFirbanks DO     ECG reviewed by me, the ED Provider: yes    Patient location:  ED  Previous ECG:     Previous ECG:  Compared to current    Similarity:  No change  Interpretation:     Interpretation: normal    Rate:     ECG rate:  91    ECG rate assessment: normal    Rhythm:     Rhythm: sinus rhythm    Ectopy:     Ectopy: none    QRS:     QRS axis:  Normal    QRS intervals:  Normal  Conduction:     Conduction: normal    ST segments:     ST segments:  Normal  T waves:     T waves: normal               ED Course  ED Course as of Sep 07 2040   Mon Sep 07, 2020   1734 Troponin I: <0 02   1734 D-Dimer, Quant: <0 27 2039 Updated patient about repeat troponin and EKG being normal   Will start patient on naproxen for likely musculoskeletal pain  Also recommended over-the-counter Lidoderm patches  Advised him to follow up with his PCP and return if any of the symptoms worsen or he becomes short of breath or develops any other new concerning symptoms  Troponin I: <0 02       US AUDIT      Most Recent Value   Initial Alcohol Screen: US AUDIT-C    1  How often do you have a drink containing alcohol?  0 Filed at: 09/07/2020 1653   2  How many drinks containing alcohol do you have on a typical day you are drinking? 0 Filed at: 09/07/2020 1653   3a  Male UNDER 65: How often do you have five or more drinks on one occasion? 0 Filed at: 09/07/2020 1653   3b  FEMALE Any Age, or MALE 65+:  How often do you have 4 or more drinks on one occassion? 0 Filed at: 09/07/2020 1653   Audit-C Score  0 Filed at: 09/07/2020 1653            HEART Risk Score      Most Recent Value   Heart Score Risk Calculator   History  0 Filed at: 09/07/2020 1733   ECG  0 Filed at: 09/07/2020 1733   Age  1 Filed at: 09/07/2020 1733   Risk Factors  1 Filed at: 09/07/2020 1733   Troponin  0 Filed at: 09/07/2020 1733   HEART Score  2 Filed at: 09/07/2020 1733            CLEMENTINE/DAST-10      Most Recent Value   How many times in the past year have you    Used an illegal drug or used a prescription medication for non-medical reasons?   Never Filed at: 09/07/2020 1653          PERC Rule for PE      Most Recent Value   PERC Rule for PE   Age >=50  1 Filed at: 09/07/2020 1733   HR >=100  1 Filed at: 09/07/2020 1733   O2 Sat on room air < 95%  0 Filed at: 09/07/2020 1733   History of PE or DVT  0 Filed at: 09/07/2020 1733   Recent trauma or surgery  0 Filed at: 09/07/2020 1733   Hemoptysis  0 Filed at: 09/07/2020 1733   Exogenous estrogen  0 Filed at: 09/07/2020 1733   Unilateral leg swelling  0 Filed at: 09/07/2020 1733   PERC Rule for PE Results  2 Filed at: 09/07/2020 1733                Wells' Criteria for PE      Most Recent Value   Wells' Criteria for PE   Clinical signs and symptoms of DVT  0 Filed at: 09/07/2020 1733   PE is primary diagnosis or equally likely  0 Filed at: 09/07/2020 1733   HR >100  1 5 Filed at: 09/07/2020 1733   Immobilization at least 3 days or Surgery in the previous 4 weeks  0 Filed at: 09/07/2020 1733   Previous, objectively diagnosed PE or DVT  0 Filed at: 09/07/2020 1733   Hemoptysis  0 Filed at: 09/07/2020 1733   Malignancy with treatment within 6 months or palliative  0 Filed at: 09/07/2020 1733   Faith Criteria Total  1 5 Filed at: 09/07/2020 1733                  MDM  Number of Diagnoses or Management Options  Diagnosis management comments: 67-year-old male presents to the ED with 2 days of bilateral chest pain, mostly in his lower chest wall  Most likely this is musculoskeletal in origin but given age, comorbidities and mild tachycardia, other considerations include pneumonia, PE, angina or acute coronary syndrome but less likely, less likely aortic dissection, possibly pancreatitis  Will workup with cardiac and abdominal labs, D-dimer, EKG  If D-dimer negative, will obtain chest x-ray  If D-dimer elevated, will pursue CTA of the chest   Will give Toradol and Lidoderm patch for symptom relief  Will keep in the department for 3 hours to repeat troponin and EKG  Amount and/or Complexity of Data Reviewed  Clinical lab tests: ordered and reviewed  Tests in the radiology section of CPT®: ordered and reviewed  Tests in the medicine section of CPT®: ordered and reviewed  Independent visualization of images, tracings, or specimens: yes          Disposition  Final diagnoses:   Anterior chest wall pain - Bilateral     Time reflects when diagnosis was documented in both MDM as applicable and the Disposition within this note     Time User Action Codes Description Comment    9/7/2020  8:39 PM Jodi RESENDIZ Add [R07 89] Anterior chest wall pain     9/7/2020  8:39 PM Kim Corado Modify [R07 89] Anterior chest wall pain Bilateral      ED Disposition     ED Disposition Condition Date/Time Comment    Discharge Stable Mon Sep 7, 2020  8:39 PM Nicole Bloom [facility] discharge to home/self care              Follow-up Information     Follow up With Specialties Details Why Contact Info Additional Information    Rhae Severance, MD Internal Medicine Schedule an appointment as soon as possible for a visit   [address] Emergency Department Emergency Medicine Go to  If symptoms worsen [address] [facility] [address] ED, [address] [place], [place], [address]          Patient's Medications   Discharge Prescriptions    NAPROXEN (NAPROSYN) 500 MG TABLET    Take 1 tablet (500 mg total) by mouth every 12 (twelve) hours as needed for mild pain or moderate pain       Start Date: 9/7/2020  End Date: --       Order Dose: 500 mg       Quantity: 20 tablet    Refills: 0     No discharge procedures on file      PDMP Review     None          ED Provider  Electronically Signed by           Linda Ochoa DO  09/07/20 5236

## 2020-09-08 LAB
ATRIAL RATE: 108 BPM
ATRIAL RATE: 91 BPM
P AXIS: 63 DEGREES
P AXIS: 66 DEGREES
PR INTERVAL: 152 MS
PR INTERVAL: 160 MS
QRS AXIS: 78 DEGREES
QRS AXIS: 79 DEGREES
QRSD INTERVAL: 84 MS
QRSD INTERVAL: 88 MS
QT INTERVAL: 346 MS
QT INTERVAL: 374 MS
QTC INTERVAL: 460 MS
QTC INTERVAL: 463 MS
T WAVE AXIS: 60 DEGREES
T WAVE AXIS: 60 DEGREES
VENTRICULAR RATE: 108 BPM
VENTRICULAR RATE: 91 BPM

## 2020-09-08 PROCEDURE — 93010 ELECTROCARDIOGRAM REPORT: CPT | Performed by: INTERNAL MEDICINE

## 2020-09-08 NOTE — DISCHARGE INSTRUCTIONS
Chest Wall Pain   WHAT YOU NEED TO KNOW:   Chest wall pain may be caused by problems with the muscles, cartilage, or bones of the chest wall  Chest wall pain may also be caused by pain that spreads to your chest from another part of your body  The pain may be aching, severe, dull, or sharp  It may come and go, or it may be constant  The pain may be worse when you move in certain ways, breathe deeply, or cough  DISCHARGE INSTRUCTIONS:   Call 911 if:   · You have any of the following signs of a heart attack:      ¨ Squeezing, pressure, or pain in your chest that lasts longer than 5 minutes or returns    ¨ Discomfort or pain in your back, neck, jaw, stomach, or arm     ¨ Trouble breathing    ¨ Nausea or vomiting    ¨ Lightheadedness or a sudden cold sweat, especially with chest pain or trouble breathing    Return to the emergency department if:   · You have severe pain  Contact your healthcare provider if:   · You develop a rash  · You have other new symptoms  · Your pain does not improve, even with treatment  · You have questions or concerns about your condition or care  Medicines: You may need any of the following:  · NSAIDs , such as ibuprofen, help decrease swelling, pain, and fever  This medicine is available with or without a doctor's order  NSAIDs can cause stomach bleeding or kidney problems in certain people  If you take blood thinner medicine, always ask your healthcare provider if NSAIDs are safe for you  Always read the medicine label and follow directions  · Acetaminophen  decreases pain  It is available without a doctor's order  Ask how much to take and how often to take it  Follow directions  Acetaminophen can cause liver damage if not taken correctly  · A cream  may be applied to your chest to decrease pain  · Take your medicine as directed  Contact your healthcare provider if you think your medicine is not helping or if you have side effects   Tell him of her if you are allergic to any medicine  Keep a list of the medicines, vitamins, and herbs you take  Include the amounts, and when and why you take them  Bring the list or the pill bottles to follow-up visits  Carry your medicine list with you in case of an emergency  Follow up with your healthcare provider as directed:  Write down your questions so you remember to ask them during your visits  Self-care:   · Rest  as needed  Avoid activities that make your chest wall pain worse  · Apply heat  on your chest for 20 to 30 minutes every 2 hours for as many days as directed  Heat helps decrease pain and muscle spasms  · Apply ice  on your chest for 15 to 20 minutes every hour or as directed  Use an ice pack, or put crushed ice in a plastic bag  Cover it with a towel  Ice helps prevent tissue damage and decreases swelling and pain  © 2017 [facility] Information is for End User's use only and may not be sold, redistributed or otherwise used for commercial purposes  All illustrations and images included in CareNotes® are the copyrighted property of A D A M [employer]  or Eren Butcher  The above information is an  only  It is not intended as medical advice for individual conditions or treatments  Talk to your doctor, nurse or pharmacist before following any medical regimen to see if it is safe and effective for you

## 2020-09-14 ENCOUNTER — TELEPHONE (OUTPATIENT)
Dept: NEUROLOGY | Facility: CLINIC | Age: 55
End: 2020-09-14

## 2020-09-14 NOTE — TELEPHONE ENCOUNTER
Called and left an message that Dr Ruben Elise will not be in the office, and need to reschedule appt

## 2020-10-19 ENCOUNTER — OFFICE VISIT (OUTPATIENT)
Dept: NEUROLOGY | Facility: CLINIC | Age: 55
End: 2020-10-19
Payer: COMMERCIAL

## 2020-10-19 VITALS
SYSTOLIC BLOOD PRESSURE: 136 MMHG | HEART RATE: 96 BPM | DIASTOLIC BLOOD PRESSURE: 86 MMHG | HEIGHT: 72 IN | WEIGHT: 204 LBS | BODY MASS INDEX: 27.63 KG/M2

## 2020-10-19 DIAGNOSIS — G60.9 IDIOPATHIC PERIPHERAL NEUROPATHY: Primary | ICD-10-CM

## 2020-10-19 PROCEDURE — 99213 OFFICE O/P EST LOW 20 MIN: CPT | Performed by: PSYCHIATRY & NEUROLOGY

## 2020-10-19 RX ORDER — LOSARTAN POTASSIUM 100 MG/1
100 TABLET ORAL DAILY
COMMUNITY
Start: 2020-10-15

## 2021-03-10 DIAGNOSIS — Z23 ENCOUNTER FOR IMMUNIZATION: ICD-10-CM

## 2021-03-11 ENCOUNTER — HOSPITAL ENCOUNTER (EMERGENCY)
Facility: HOSPITAL | Age: 56
Discharge: HOME | End: 2021-03-11
Attending: EMERGENCY MEDICINE
Payer: COMMERCIAL

## 2021-03-11 ENCOUNTER — APPOINTMENT (EMERGENCY)
Dept: CT IMAGING | Facility: HOSPITAL | Age: 56
End: 2021-03-11
Payer: COMMERCIAL

## 2021-03-11 VITALS
TEMPERATURE: 98.4 F | HEART RATE: 93 BPM | WEIGHT: 210.98 LBS | RESPIRATION RATE: 13 BRPM | DIASTOLIC BLOOD PRESSURE: 81 MMHG | OXYGEN SATURATION: 95 % | SYSTOLIC BLOOD PRESSURE: 126 MMHG | BODY MASS INDEX: 28.61 KG/M2

## 2021-03-11 DIAGNOSIS — R10.9 ABDOMINAL PAIN: Primary | ICD-10-CM

## 2021-03-11 LAB
ALBUMIN SERPL BCP-MCNC: 3.9 G/DL (ref 3.5–5)
ALP SERPL-CCNC: 89 U/L (ref 46–116)
ALT SERPL W P-5'-P-CCNC: 34 U/L (ref 12–78)
ANION GAP SERPL CALCULATED.3IONS-SCNC: 6 MMOL/L (ref 4–13)
AST SERPL W P-5'-P-CCNC: 18 U/L (ref 5–45)
BASOPHILS # BLD AUTO: 0.01 THOUSANDS/ΜL (ref 0–0.1)
BASOPHILS NFR BLD AUTO: 0 % (ref 0–1)
BILIRUB SERPL-MCNC: 0.4 MG/DL (ref 0.2–1)
BILIRUB UR QL STRIP: NEGATIVE
BUN SERPL-MCNC: 11 MG/DL (ref 5–25)
CALCIUM SERPL-MCNC: 9 MG/DL (ref 8.3–10.1)
CHLORIDE SERPL-SCNC: 104 MMOL/L (ref 100–108)
CLARITY UR: CLEAR
CO2 SERPL-SCNC: 33 MMOL/L (ref 21–32)
COLOR UR: YELLOW
CREAT SERPL-MCNC: 0.99 MG/DL (ref 0.6–1.3)
EOSINOPHIL # BLD AUTO: 0.05 THOUSAND/ΜL (ref 0–0.61)
EOSINOPHIL NFR BLD AUTO: 1 % (ref 0–6)
ERYTHROCYTE [DISTWIDTH] IN BLOOD BY AUTOMATED COUNT: 12.2 % (ref 11.6–15.1)
GFR SERPL CREATININE-BSD FRML MDRD: 86 ML/MIN/1.73SQ M
GLUCOSE SERPL-MCNC: 111 MG/DL (ref 65–140)
GLUCOSE UR STRIP-MCNC: NEGATIVE MG/DL
HCT VFR BLD AUTO: 42.4 % (ref 36.5–49.3)
HGB BLD-MCNC: 14.3 G/DL (ref 12–17)
HGB UR QL STRIP.AUTO: NEGATIVE
IMM GRANULOCYTES # BLD AUTO: 0.01 THOUSAND/UL (ref 0–0.2)
IMM GRANULOCYTES NFR BLD AUTO: 0 % (ref 0–2)
KETONES UR STRIP-MCNC: NEGATIVE MG/DL
LEUKOCYTE ESTERASE UR QL STRIP: NEGATIVE
LIPASE SERPL-CCNC: 74 U/L (ref 73–393)
LYMPHOCYTES # BLD AUTO: 1.31 THOUSANDS/ΜL (ref 0.6–4.47)
LYMPHOCYTES NFR BLD AUTO: 27 % (ref 14–44)
MCH RBC QN AUTO: 31.8 PG (ref 26.8–34.3)
MCHC RBC AUTO-ENTMCNC: 33.7 G/DL (ref 31.4–37.4)
MCV RBC AUTO: 94 FL (ref 82–98)
MONOCYTES # BLD AUTO: 0.38 THOUSAND/ΜL (ref 0.17–1.22)
MONOCYTES NFR BLD AUTO: 8 % (ref 4–12)
NEUTROPHILS # BLD AUTO: 3.15 THOUSANDS/ΜL (ref 1.85–7.62)
NEUTS SEG NFR BLD AUTO: 64 % (ref 43–75)
NITRITE UR QL STRIP: NEGATIVE
NRBC BLD AUTO-RTO: 0 /100 WBCS
PH UR STRIP.AUTO: 6 [PH]
PLATELET # BLD AUTO: 269 THOUSANDS/UL (ref 149–390)
PMV BLD AUTO: 10.2 FL (ref 8.9–12.7)
POTASSIUM SERPL-SCNC: 4.1 MMOL/L (ref 3.5–5.3)
PROT SERPL-MCNC: 7.6 G/DL (ref 6.4–8.2)
PROT UR STRIP-MCNC: NEGATIVE MG/DL
RBC # BLD AUTO: 4.5 MILLION/UL (ref 3.88–5.62)
SODIUM SERPL-SCNC: 143 MMOL/L (ref 136–145)
SP GR UR STRIP.AUTO: 1.02 (ref 1–1.03)
TROPONIN I SERPL-MCNC: <0.02 NG/ML
UROBILINOGEN UR QL STRIP.AUTO: 0.2 E.U./DL
WBC # BLD AUTO: 4.91 THOUSAND/UL (ref 4.31–10.16)

## 2021-03-11 PROCEDURE — 84484 ASSAY OF TROPONIN QUANT: CPT | Performed by: EMERGENCY MEDICINE

## 2021-03-11 PROCEDURE — 81003 URINALYSIS AUTO W/O SCOPE: CPT | Performed by: EMERGENCY MEDICINE

## 2021-03-11 PROCEDURE — 80053 COMPREHEN METABOLIC PANEL: CPT | Performed by: EMERGENCY MEDICINE

## 2021-03-11 PROCEDURE — 93005 ELECTROCARDIOGRAM TRACING: CPT

## 2021-03-11 PROCEDURE — 74177 CT ABD & PELVIS W/CONTRAST: CPT

## 2021-03-11 PROCEDURE — 85025 COMPLETE CBC W/AUTO DIFF WBC: CPT | Performed by: EMERGENCY MEDICINE

## 2021-03-11 PROCEDURE — 99285 EMERGENCY DEPT VISIT HI MDM: CPT | Performed by: EMERGENCY MEDICINE

## 2021-03-11 PROCEDURE — 83690 ASSAY OF LIPASE: CPT | Performed by: EMERGENCY MEDICINE

## 2021-03-11 PROCEDURE — 99285 EMERGENCY DEPT VISIT HI MDM: CPT

## 2021-03-11 PROCEDURE — G1004 CDSM NDSC: HCPCS

## 2021-03-11 PROCEDURE — 96374 THER/PROPH/DIAG INJ IV PUSH: CPT

## 2021-03-11 PROCEDURE — 36415 COLL VENOUS BLD VENIPUNCTURE: CPT | Performed by: EMERGENCY MEDICINE

## 2021-03-11 RX ORDER — KETOROLAC TROMETHAMINE 30 MG/ML
15 INJECTION, SOLUTION INTRAMUSCULAR; INTRAVENOUS ONCE
Status: COMPLETED | OUTPATIENT
Start: 2021-03-11 | End: 2021-03-11

## 2021-03-11 RX ADMIN — IOHEXOL 100 ML: 350 INJECTION, SOLUTION INTRAVENOUS at 19:42

## 2021-03-11 RX ADMIN — KETOROLAC TROMETHAMINE 15 MG: 30 INJECTION, SOLUTION INTRAMUSCULAR at 19:03

## 2021-03-11 NOTE — ED PROVIDER NOTES
History  Chief Complaint   Patient presents with    Abdominal Pain     Pt states he has lower abdominal pain that started 8 days ago  Pt reports the pain now radiates to he lower back  Denies any urinary symptoms      HPI  49 yo male presents with bilateral lower abdominal pain that started about 8 days ago  States it feels like aching pain and moderate  States that yesterday he started also having pain in his left upper quadrant  He tried probiotics but this did not help  Denies nausea, vomiting, diarrhea constipation  Prior to Admission Medications   Prescriptions Last Dose Informant Patient Reported? Taking?    AJOVY 225 MG/1 5ML SOSY   Yes No   Sig: Inject 225 mg under the skin every 30 (thirty) days   Diclofenac Sodium 1 6 % GEL   Yes No   Sig: Place on the skin as needed   amLODIPine (NORVASC) 2 5 mg tablet  Self Yes No   Sig: Take 2 5 mg by mouth daily   celecoxib (CeleBREX) 200 mg capsule   Yes No   Sig: Take 200 mg by mouth daily   fluticasone (FLONASE) 50 mcg/act nasal spray   Yes No   Sig: fluticasone propionate 50 mcg/actuation nasal spray,suspension   SPRAY 2 SPRAYS INTO EACH NOSTRIL EVERY DAY   gabapentin (NEURONTIN) 600 MG tablet   No No   Sig: TAKE 2 TABLETS BY MOUTH 3  TIMES DAILY   loratadine-pseudoephedrine (CLARITIN-D 24-HOUR)  mg per 24 hr tablet   Yes No   Sig: Take 1 tablet by mouth daily   losartan (COZAAR) 100 MG tablet   Yes No   metaxalone (SKELAXIN) 800 mg tablet   Yes No   Sig: Take 800 mg by mouth 3 (three) times a day   naproxen (NAPROSYN) 500 mg tablet   No No   Sig: Take 1 tablet (500 mg total) by mouth every 12 (twelve) hours as needed for mild pain or moderate pain   omeprazole (PriLOSEC) 20 mg delayed release capsule   Yes No   Sig: Take 1 capsule by mouth 2 (two) times a day   rosuvastatin (CRESTOR) 5 mg tablet   Yes No   Sig: Take 1 tablet by mouth daily   solifenacin (VESICARE) 5 mg tablet  Self Yes No   Sig: Take 5 mg by mouth daily   traMADol (ULTRAM-ER) 300 MG 24 hr tablet   Yes No   Sig: Take 300 mg by mouth daily      Facility-Administered Medications: None       Past Medical History:   Diagnosis Date    Hyperlipidemia     Hypertension        Past Surgical History:   Procedure Laterality Date    ANKLE LIGAMENT RECONSTRUCTION Right 2008    ARTHROSCOPY KNEE Left 05/06/2019    x2 prior tear 2018    KNEE SURGERY Right     ROTATOR CUFF REPAIR Left 2017    4 anchors    ULNAR NERVE TRANSPOSITION Bilateral 2010    WRIST FUSION Right 12/06/2019    WRIST SURGERY Right 2017/18       Family History   Problem Relation Age of Onset    Hypertension Mother     Hypertension Father     Kidney cancer Father      I have reviewed and agree with the history as documented  E-Cigarette/Vaping     E-Cigarette/Vaping Substances     Social History     Tobacco Use    Smoking status: Never Smoker    Smokeless tobacco: Never Used   Substance Use Topics    Alcohol use: Yes     Alcohol/week: 2 0 standard drinks     Types: 2 Cans of beer per week     Frequency: Monthly or less     Comment: socially    Drug use: No       Review of Systems   Constitutional: Negative for chills and fever  HENT: Negative for dental problem and ear pain  Eyes: Negative for pain and redness  Respiratory: Negative for cough and shortness of breath  Cardiovascular: Negative for chest pain and palpitations  Gastrointestinal: Positive for abdominal pain  Negative for nausea  Endocrine: Negative for polydipsia and polyphagia  Genitourinary: Negative for dysuria and frequency  Musculoskeletal: Negative for arthralgias and joint swelling  Skin: Negative for color change and rash  Neurological: Negative for dizziness and headaches  Psychiatric/Behavioral: Negative for behavioral problems and confusion  All other systems reviewed and are negative  Physical Exam  Physical Exam  Vitals signs and nursing note reviewed  Constitutional:       General: He is not in acute distress  Appearance: He is well-developed  He is not diaphoretic  HENT:      Head: Atraumatic  Right Ear: External ear normal       Left Ear: External ear normal       Nose: Nose normal    Eyes:      Conjunctiva/sclera: Conjunctivae normal       Pupils: Pupils are equal, round, and reactive to light  Neck:      Musculoskeletal: Normal range of motion and neck supple  Vascular: No JVD  Cardiovascular:      Rate and Rhythm: Normal rate and regular rhythm  Heart sounds: Normal heart sounds  No murmur  Pulmonary:      Effort: Pulmonary effort is normal  No respiratory distress  Breath sounds: Normal breath sounds  No wheezing  Abdominal:      General: Bowel sounds are normal  There is no distension  Palpations: Abdomen is soft  Tenderness: There is abdominal tenderness in the right lower quadrant, left upper quadrant and left lower quadrant  Musculoskeletal: Normal range of motion  Skin:     General: Skin is warm and dry  Capillary Refill: Capillary refill takes less than 2 seconds  Neurological:      Mental Status: He is alert and oriented to person, place, and time  Cranial Nerves: No cranial nerve deficit     Psychiatric:         Behavior: Behavior normal          Vital Signs  ED Triage Vitals   Temperature Pulse Respirations Blood Pressure SpO2   03/11/21 1826 03/11/21 1826 03/11/21 1826 03/11/21 1826 03/11/21 1826   98 4 °F (36 9 °C) 105 18 154/96 98 %      Temp Source Heart Rate Source Patient Position - Orthostatic VS BP Location FiO2 (%)   03/11/21 1826 03/11/21 1826 -- 03/11/21 1826 --   Oral Monitor  Left arm       Pain Score       03/11/21 1900       6           Vitals:    03/11/21 1826 03/11/21 2000 03/11/21 2030   BP: 154/96 134/79 126/81   Pulse: 105 96 93         Visual Acuity      ED Medications  Medications   ketorolac (TORADOL) injection 15 mg (15 mg Intravenous Given 3/11/21 1903)   iohexol (OMNIPAQUE) 350 MG/ML injection (MULTI-DOSE) 100 mL (100 mL Intravenous Given 3/11/21 1942)       Diagnostic Studies  Results Reviewed     Procedure Component Value Units Date/Time    Troponin I [[id number]]  (Normal) Collected: 03/11/21 1857    Lab Status: Final result Specimen: Blood from Arm, Left Updated: 03/11/21 1923     Troponin I <0 02 ng/mL     Comprehensive metabolic panel [[id number]]  (Abnormal) Collected: 03/11/21 1857    Lab Status: Final result Specimen: Blood from Arm, Left Updated: 03/11/21 1921     Sodium 143 mmol/L      Potassium 4 1 mmol/L      Chloride 104 mmol/L      CO2 33 mmol/L      ANION GAP 6 mmol/L      BUN 11 mg/dL      Creatinine 0 99 mg/dL      Glucose 111 mg/dL      Calcium 9 0 mg/dL      AST 18 U/L      ALT 34 U/L      Alkaline Phosphatase 89 U/L      Total Protein 7 6 g/dL      Albumin 3 9 g/dL      Total Bilirubin 0 40 mg/dL      eGFR 86 ml/min/1 73sq m     Narrative:      [place] guidelines for Chronic Kidney Disease (CKD):     Stage 1 with normal or high GFR (GFR > 90 mL/min/1 73 square meters)    Stage 2 Mild CKD (GFR = 60-89 mL/min/1 73 square meters)    Stage 3A Moderate CKD (GFR = 45-59 mL/min/1 73 square meters)    Stage 3B Moderate CKD (GFR = 30-44 mL/min/1 73 square meters)    Stage 4 Severe CKD (GFR = 15-29 mL/min/1 73 square meters)    Stage 5 End Stage CKD (GFR <15 mL/min/1 73 square meters)  Note: GFR calculation is accurate only with a steady state creatinine    Lipase [[id number]]  (Normal) Collected: 03/11/21 1857    Lab Status: Final result Specimen: Blood from Arm, Left Updated: 03/11/21 1916     Lipase 74 u/L     UA (URINE) with reflex to Scope [[id number]] Collected: 03/11/21 1903    Lab Status: Final result Specimen: Urine, Clean Catch Updated: 03/11/21 1912     Color, UA Yellow     Clarity, UA Clear     Specific [place], UA 1 020     pH, UA 6 0     Leukocytes, UA Negative     Nitrite, UA Negative     Protein, UA Negative mg/dl      Glucose, UA Negative mg/dl      Ketones, UA Negative mg/dl Urobilinogen, UA 0 2 E U /dl      Bilirubin, UA Negative     Blood, UA Negative    CBC and differential [[id number]] Collected: 03/11/21 1857    Lab Status: Final result Specimen: Blood from Arm, Left Updated: 03/11/21 1902     WBC 4 91 Thousand/uL      RBC 4 50 Million/uL      Hemoglobin 14 3 g/dL      Hematocrit 42 4 %      MCV 94 fL      MCH 31 8 pg      MCHC 33 7 g/dL      RDW 12 2 %      MPV 10 2 fL      Platelets 643 Thousands/uL      nRBC 0 /100 WBCs      Neutrophils Relative 64 %      Immat GRANS % 0 %      Lymphocytes Relative 27 %      Monocytes Relative 8 %      Eosinophils Relative 1 %      Basophils Relative 0 %      Neutrophils Absolute 3 15 Thousands/µL      Immature Grans Absolute 0 01 Thousand/uL      Lymphocytes Absolute 1 31 Thousands/µL      Monocytes Absolute 0 38 Thousand/µL      Eosinophils Absolute 0 05 Thousand/µL      Basophils Absolute 0 01 Thousands/µL                  CT abdomen pelvis with contrast   Final Result by Nate Sow MD (03/11 2027)   Peripelvic cysts are noted  Questionable nonobstructing 4 mm stone on the left  No hydronephrosis   1 cm nodular soft tissue structure seen in the underside of the stomach best noted on the coronal views (601:94, possibly a lymph node, nodule, diverticulum, or ulcer  Recommend clinical correlation and elective GI consultation  The study was marked in [facility] for immediate notification  Workstation performed: [id number]                 Procedures  ECG 12 Lead Documentation Only    Date/Time: 3/11/2021 8:24 PM  Performed by: Maggie Sim MD  Authorized by: Maggie Sim MD     Comments:      NSR rate of 99 no acute ST elevations or depressions             ED Course                             SBIRT 22yo+      Most Recent Value   SBIRT (23 yo +)   In order to provide better care to our patients, we are screening all of our patients for alcohol and drug use  Would it be okay to ask you these screening questions?   No Filed at: 03/11/2021 4231 MDM  Patient presents with abdominal pain  Exam is benign, labs reassuring  CT shows nodule/lymph node/ulcer under stomach  Kidney stone nonobstructing on the left  Discussed close GI follow up for possible endoscopy if symptoms persist   Disposition  Final diagnoses:   Abdominal pain     Time reflects when diagnosis was documented in both MDM as applicable and the Disposition within this note     Time User Action Codes Description Comment    3/11/2021  8:35 PM Fei Trejo Add [R10 9] Abdominal pain       ED Disposition     ED Disposition Condition Date/Time Comment    Discharge Stable Thu Mar 11, 2021  8:35  [address] discharge to home/self care              Follow-up Information     Follow up With Specialties Details Why Contact Info Additional Information    [facility] Gastroenterology Call   [address] [facility] [address]  [address] [address] [phone] [facility], [address] [place] [address] [address], [facility], [place], [address] [facility]          Discharge Medication List as of 3/11/2021  8:37 PM      CONTINUE these medications which have NOT CHANGED    Details   AJOVY 225 MG/1 5ML SOSY Inject 225 mg under the skin every 30 (thirty) days, Starting Mon 7/1/2019, Historical Med      amLODIPine (NORVASC) 2 5 mg tablet Take 2 5 mg by mouth daily, Historical Med      celecoxib (CeleBREX) 200 mg capsule Take 200 mg by mouth daily, Until Discontinued, Historical Med      Diclofenac Sodium 1 6 % GEL Place on the skin as needed, Historical Med      fluticasone (FLONASE) 50 mcg/act nasal spray fluticasone propionate 50 mcg/actuation nasal spray,suspension   SPRAY 2 SPRAYS INTO EACH NOSTRIL EVERY DAY, Historical Med      gabapentin (NEURONTIN) 600 MG tablet TAKE 2 TABLETS BY MOUTH 3  TIMES DAILY, Normal      loratadine-pseudoephedrine (CLARITIN-D 24-HOUR)  mg per 24 hr tablet Take 1 tablet by mouth daily, Historical Med      losartan (COZAAR) 100 MG tablet Starting u 10/15/2020, Historical Med      metaxalone (SKELAXIN) 800 mg tablet Take 800 mg by mouth 3 (three) times a day, Until Discontinued, Historical Med      naproxen (NAPROSYN) 500 mg tablet Take 1 tablet (500 mg total) by mouth every 12 (twelve) hours as needed for mild pain or moderate pain, Starting Mon 9/7/2020, Print      omeprazole (PriLOSEC) 20 mg delayed release capsule Take 1 capsule by mouth 2 (two) times a day, Starting Sun 6/23/2019, Historical Med      rosuvastatin (CRESTOR) 5 mg tablet Take 1 tablet by mouth daily, Starting Sun 6/30/2019, Historical Med      solifenacin (VESICARE) 5 mg tablet Take 5 mg by mouth daily, Historical Med      traMADol (ULTRAM-ER) 300 MG 24 hr tablet Take 300 mg by mouth daily, Until Discontinued, Historical Med           No discharge procedures on file      PDMP Review     None          ED Provider  Electronically Signed by           Jagdish Galeano MD  03/11/21 2050

## 2021-03-12 LAB
ATRIAL RATE: 99 BPM
P AXIS: 57 DEGREES
PR INTERVAL: 160 MS
QRS AXIS: 78 DEGREES
QRSD INTERVAL: 86 MS
QT INTERVAL: 338 MS
QTC INTERVAL: 433 MS
T WAVE AXIS: 46 DEGREES
VENTRICULAR RATE: 99 BPM

## 2021-03-12 PROCEDURE — 93010 ELECTROCARDIOGRAM REPORT: CPT | Performed by: INTERNAL MEDICINE

## 2021-03-12 NOTE — ED NOTES
Discharged reviewed with pt  Pt verbalized understanding and has no further questions at this time  Pt ambulatory off unit with steady gait       Priti Thomas RN  03/11/21 2045

## 2021-03-12 NOTE — DISCHARGE INSTRUCTIONS
Take pepcid over the counter for continued symptoms and please call for GI follow up as they can consider endoscopy

## 2021-03-16 ENCOUNTER — TELEPHONE (OUTPATIENT)
Dept: GASTROENTEROLOGY | Facility: CLINIC | Age: 56
End: 2021-03-16

## 2021-03-26 ENCOUNTER — OFFICE VISIT (OUTPATIENT)
Dept: GASTROENTEROLOGY | Facility: CLINIC | Age: 56
End: 2021-03-26
Payer: COMMERCIAL

## 2021-03-26 VITALS
WEIGHT: 206.2 LBS | DIASTOLIC BLOOD PRESSURE: 90 MMHG | HEART RATE: 91 BPM | BODY MASS INDEX: 27.93 KG/M2 | SYSTOLIC BLOOD PRESSURE: 126 MMHG | HEIGHT: 72 IN

## 2021-03-26 DIAGNOSIS — K21.9 GASTROESOPHAGEAL REFLUX DISEASE, UNSPECIFIED WHETHER ESOPHAGITIS PRESENT: ICD-10-CM

## 2021-03-26 DIAGNOSIS — R10.9 ABDOMINAL CRAMPING: Primary | ICD-10-CM

## 2021-03-26 DIAGNOSIS — K31.89 GASTRIC WALL THICKENING: ICD-10-CM

## 2021-03-26 PROCEDURE — 99204 OFFICE O/P NEW MOD 45 MIN: CPT | Performed by: PHYSICIAN ASSISTANT

## 2021-03-26 RX ORDER — PANTOPRAZOLE SODIUM 40 MG/1
40 TABLET, DELAYED RELEASE ORAL 2 TIMES DAILY
Qty: 60 TABLET | Refills: 2 | Status: SHIPPED | OUTPATIENT
Start: 2021-03-26 | End: 2021-06-25

## 2021-03-26 RX ORDER — DICYCLOMINE HCL 20 MG
20 TABLET ORAL EVERY 6 HOURS PRN
Qty: 60 TABLET | Refills: 2 | Status: SHIPPED | OUTPATIENT
Start: 2021-03-26 | End: 2021-04-02

## 2021-03-26 NOTE — PROGRESS NOTES
[facility]  Juliette Palm 48 y o  male MRN: [id number]       CC:   Emergency room follow-up    HPI:  Dagoberto Funk is a 78-year-old male with history of hypertension, hyperlipidemia and chronic pain on tramadol  Patient is here to follow-up after he was seen in the emergency room  On 03/11 for abdominal pain  Patient reports that a week prior to presenting to the emergency room, he has been experiencing vague lower abdominal pain that feels like a muscle strain, which then radiated to his upper abdomen  He denies pyrosis or regurgitation  No dysphagia or odynophagia  In terms of his bowel movements, he has noticed increased constipation with very small amount of stool output daily  This is not normal for him  His tramadol dosing has not changed recently either  He does not take NSAIDs, but he does take celecoxib  In the ED, CT scan showing a 1 cm nodular soft tissue structure in the underside of the stomach unable to be identified  Otherwise, no other obvious abnormalities from the GI standpoint  Patient reports that he had an EGD over 10 years ago, was told that he had a hiatal hernia  He has been on omeprazole chronically for that  He has never had a colonoscopy, it does not have family history of colon cancer to his knowledge  Review of Systems:    CONSTITUTIONAL: Denies any fever, chills, or rigors  Good appetite, and no recent weight loss  HEENT: No earache or tinnitus  Denies hearing loss or visual disturbances  CARDIOVASCULAR: No chest pain or palpitations  RESPIRATORY: Denies any cough, hemoptysis, shortness of breath or dyspnea on exertion  GASTROINTESTINAL: As noted in the History of Present Illness  GENITOURINARY: No problems with urination  Denies any hematuria or dysuria  NEUROLOGIC: No dizziness or vertigo, denies headaches  MUSCULOSKELETAL: Denies any muscle or joint pain  SKIN: Denies skin rashes or itching     ENDOCRINE: Denies excessive thirst  Denies intolerance to heat or cold  PSYCHOSOCIAL: Denies depression or anxiety  Denies any recent memory loss  Current Outpatient Medications   Medication Sig Dispense Refill    AJOVY 225 MG/1 5ML SOSY Inject 225 mg under the skin every 30 (thirty) days      amLODIPine (NORVASC) 2 5 mg tablet Take 2 5 mg by mouth daily      celecoxib (CeleBREX) 200 mg capsule Take 200 mg by mouth daily      Diclofenac Sodium 1 6 % GEL Place on the skin as needed      fluticasone (FLONASE) 50 mcg/act nasal spray fluticasone propionate 50 mcg/actuation nasal spray,suspension   SPRAY 2 SPRAYS INTO EACH NOSTRIL EVERY DAY      gabapentin (NEURONTIN) 600 MG tablet TAKE 2 TABLETS BY MOUTH 3  TIMES DAILY 540 tablet 3    loratadine-pseudoephedrine (CLARITIN-D 24-HOUR)  mg per 24 hr tablet Take 1 tablet by mouth daily      losartan (COZAAR) 100 MG tablet       metaxalone (SKELAXIN) 800 mg tablet Take 800 mg by mouth 3 (three) times a day      naproxen (NAPROSYN) 500 mg tablet Take 1 tablet (500 mg total) by mouth every 12 (twelve) hours as needed for mild pain or moderate pain 20 tablet 0    rosuvastatin (CRESTOR) 5 mg tablet Take 1 tablet by mouth daily      solifenacin (VESICARE) 5 mg tablet Take 5 mg by mouth daily      traMADol (ULTRAM-ER) 300 MG 24 hr tablet Take 300 mg by mouth daily      dicyclomine (BENTYL) 20 mg tablet Take 1 tablet (20 mg total) by mouth every 6 (six) hours as needed (abdominal pain) 60 tablet 2    pantoprazole (PROTONIX) 40 mg tablet Take 1 tablet (40 mg total) by mouth 2 (two) times a day 1 pills 30-60 minutes before breakfast and dinner 60 tablet 2     No current facility-administered medications for this visit        Past Medical History:   Diagnosis Date    Hyperlipidemia     Hypertension      Past Surgical History:   Procedure Laterality Date    ANKLE LIGAMENT RECONSTRUCTION Right 2008    ARTHROSCOPY KNEE Left 05/06/2019    x2 prior tear 2018    KNEE SURGERY Right     ROTATOR CUFF REPAIR Left 2017    4 anchors    ULNAR NERVE TRANSPOSITION Bilateral 2010    WRIST FUSION Right 12/06/2019    WRIST SURGERY Right 2017/18     Social History     Socioeconomic History    Marital status: /Civil Union     Spouse name: None    Number of children: None    Years of education: None    Highest education level: None   Occupational History    None   Social Needs    Financial resource strain: None    Food insecurity     Worry: None     Inability: None    Transportation needs     Medical: None     Non-medical: None   Tobacco Use    Smoking status: Former Smoker    Smokeless tobacco: Never Used   Substance and Sexual Activity    Alcohol use: Yes     Alcohol/week: 2 0 standard drinks     Types: 2 Cans of beer per week     Frequency: Monthly or less     Comment: socially    Drug use: No    Sexual activity: None   Lifestyle    Physical activity     Days per week: None     Minutes per session: None    Stress: None   Relationships    Social connections     Talks on phone: None     Gets together: None     Attends Protestant service: None     Active member of club or organization: None     Attends meetings of clubs or organizations: None     Relationship status: None    Intimate partner violence     Fear of current or ex partner: None     Emotionally abused: None     Physically abused: None     Forced sexual activity: None   Other Topics Concern    None   Social History Narrative    None     Family History   Problem Relation Age of Onset    Hypertension Mother     Hypertension Father     Kidney cancer Father             PHYSICAL EXAM:    Vitals:    03/26/21 1337   BP: 126/90   Pulse: 91   Weight: 93 5 kg (206 lb 3 2 oz)   Height: 6' (1 829 m)     General Appearance:   Alert and oriented x 3   Cooperative, and in no respiratory distress   HEENT:   Normocephalic, atraumatic, anicteric      Neck:  Supple, symmetrical, trachea midline   Lungs:   Clear to auscultation bilaterally  Heart[de-identified]   Regular rate and rhythm   Abdomen:   Soft, non-tender, non-distended; normal bowel sounds; no masses, no organomegaly    Genitalia:   Deferred    Rectal:   Deferred    Extremities:  No cyanosis, clubbing or edema    Pulses:  2+ and symmetric all extremities    Skin:  Skin color, texture, turgor normal, no rashes or lesions    Lymph nodes:  No palpable cervical or supraclavicular lymphadenopathy        Lab Results:   Results from last 6 Months   Lab Units 03/11/21  1857   WBC Thousand/uL 4 91   HEMOGLOBIN g/dL 14 3   HEMATOCRIT % 42 4   PLATELETS Thousands/uL 269   NEUTROS PCT % 64   LYMPHS PCT % 27   MONOS PCT % 8   EOS PCT % 1     Results from last 6 Months   Lab Units 03/11/21  1857   POTASSIUM mmol/L 4 1   CHLORIDE mmol/L 104   CO2 mmol/L 33*   BUN mg/dL 11   CREATININE mg/dL 0 99   CALCIUM mg/dL 9 0   ALK PHOS U/L 89   ALT U/L 34   AST U/L 18         Results from last 6 Months   Lab Units 03/11/21  1857   LIPASE u/L 74       Imaging Studies: I have personally reviewed pertinent imaging studies  Ct Abdomen Pelvis With Contrast    Result Date: 3/11/2021  Impression: Peripelvic cysts are noted  Questionable nonobstructing 4 mm stone on the left  No hydronephrosis 1 cm nodular soft tissue structure seen in the underside of the stomach best noted on the coronal views (601:94, possibly a lymph node, nodule, diverticulum, or ulcer  Recommend clinical correlation and elective GI consultation  The study was marked in [facility] for immediate notification  Workstation performed: [id number]      ASSESSMENT and PLAN:      1)   Upper abdominal pain, nodular structure seen near the stomach on CT scan -  Unclear etiology at this time   - EGD to investigate  - If we are unable to identify the soft tissue density, may need EUS  Patient is willing to travel to [facility] if needed    - Will switch omeprazole to pantoprazole    2) Lower abdominal pain, new onset constipation - Patient  is chronically on tramadol, but dosing has not changed in the past 15 years  He has never had a colonoscopy  - Will trial Bentyl  - Colonoscopy to investigate   - Depending on bowel movement pattern, will discuss recommendations for regimen at next visit         Follow up after EGD/colonoscopy

## 2021-03-26 NOTE — H&P (VIEW-ONLY)
[facility]  Namrata Slade 48 y o  male MRN: [id number]       CC:   Emergency room follow-up    HPI:  Nat Banda is a 52-year-old male with history of hypertension, hyperlipidemia and chronic pain on tramadol  Patient is here to follow-up after he was seen in the emergency room  On 03/11 for abdominal pain  Patient reports that a week prior to presenting to the emergency room, he has been experiencing vague lower abdominal pain that feels like a muscle strain, which then radiated to his upper abdomen  He denies pyrosis or regurgitation  No dysphagia or odynophagia  In terms of his bowel movements, he has noticed increased constipation with very small amount of stool output daily  This is not normal for him  His tramadol dosing has not changed recently either  He does not take NSAIDs, but he does take celecoxib  In the ED, CT scan showing a 1 cm nodular soft tissue structure in the underside of the stomach unable to be identified  Otherwise, no other obvious abnormalities from the GI standpoint  Patient reports that he had an EGD over 10 years ago, was told that he had a hiatal hernia  He has been on omeprazole chronically for that  He has never had a colonoscopy, it does not have family history of colon cancer to his knowledge  Review of Systems:    CONSTITUTIONAL: Denies any fever, chills, or rigors  Good appetite, and no recent weight loss  HEENT: No earache or tinnitus  Denies hearing loss or visual disturbances  CARDIOVASCULAR: No chest pain or palpitations  RESPIRATORY: Denies any cough, hemoptysis, shortness of breath or dyspnea on exertion  GASTROINTESTINAL: As noted in the History of Present Illness  GENITOURINARY: No problems with urination  Denies any hematuria or dysuria  NEUROLOGIC: No dizziness or vertigo, denies headaches  MUSCULOSKELETAL: Denies any muscle or joint pain  SKIN: Denies skin rashes or itching     ENDOCRINE: Denies excessive thirst  Denies intolerance to heat or cold  PSYCHOSOCIAL: Denies depression or anxiety  Denies any recent memory loss  Current Outpatient Medications   Medication Sig Dispense Refill    AJOVY 225 MG/1 5ML SOSY Inject 225 mg under the skin every 30 (thirty) days      amLODIPine (NORVASC) 2 5 mg tablet Take 2 5 mg by mouth daily      celecoxib (CeleBREX) 200 mg capsule Take 200 mg by mouth daily      Diclofenac Sodium 1 6 % GEL Place on the skin as needed      fluticasone (FLONASE) 50 mcg/act nasal spray fluticasone propionate 50 mcg/actuation nasal spray,suspension   SPRAY 2 SPRAYS INTO EACH NOSTRIL EVERY DAY      gabapentin (NEURONTIN) 600 MG tablet TAKE 2 TABLETS BY MOUTH 3  TIMES DAILY 540 tablet 3    loratadine-pseudoephedrine (CLARITIN-D 24-HOUR)  mg per 24 hr tablet Take 1 tablet by mouth daily      losartan (COZAAR) 100 MG tablet       metaxalone (SKELAXIN) 800 mg tablet Take 800 mg by mouth 3 (three) times a day      naproxen (NAPROSYN) 500 mg tablet Take 1 tablet (500 mg total) by mouth every 12 (twelve) hours as needed for mild pain or moderate pain 20 tablet 0    rosuvastatin (CRESTOR) 5 mg tablet Take 1 tablet by mouth daily      solifenacin (VESICARE) 5 mg tablet Take 5 mg by mouth daily      traMADol (ULTRAM-ER) 300 MG 24 hr tablet Take 300 mg by mouth daily      dicyclomine (BENTYL) 20 mg tablet Take 1 tablet (20 mg total) by mouth every 6 (six) hours as needed (abdominal pain) 60 tablet 2    pantoprazole (PROTONIX) 40 mg tablet Take 1 tablet (40 mg total) by mouth 2 (two) times a day 1 pills 30-60 minutes before breakfast and dinner 60 tablet 2     No current facility-administered medications for this visit        Past Medical History:   Diagnosis Date    Hyperlipidemia     Hypertension      Past Surgical History:   Procedure Laterality Date    ANKLE LIGAMENT RECONSTRUCTION Right 2008    ARTHROSCOPY KNEE Left 05/06/2019    x2 prior tear 2018    KNEE SURGERY Right     ROTATOR CUFF REPAIR Left 2017    4 anchors    ULNAR NERVE TRANSPOSITION Bilateral 2010    WRIST FUSION Right 12/06/2019    WRIST SURGERY Right 2017/18     Social History     Socioeconomic History    Marital status: /Civil Union     Spouse name: None    Number of children: None    Years of education: None    Highest education level: None   Occupational History    None   Social Needs    Financial resource strain: None    Food insecurity     Worry: None     Inability: None    Transportation needs     Medical: None     Non-medical: None   Tobacco Use    Smoking status: Former Smoker    Smokeless tobacco: Never Used   Substance and Sexual Activity    Alcohol use: Yes     Alcohol/week: 2 0 standard drinks     Types: 2 Cans of beer per week     Frequency: Monthly or less     Comment: socially    Drug use: No    Sexual activity: None   Lifestyle    Physical activity     Days per week: None     Minutes per session: None    Stress: None   Relationships    Social connections     Talks on phone: None     Gets together: None     Attends L[employer] service: None     Active member of club or organization: None     Attends meetings of clubs or organizations: None     Relationship status: None    Intimate partner violence     Fear of current or ex partner: None     Emotionally abused: None     Physically abused: None     Forced sexual activity: None   Other Topics Concern    None   Social History Narrative    None     Family History   Problem Relation Age of Onset    Hypertension Mother     Hypertension Father     Kidney cancer Father             PHYSICAL EXAM:    Vitals:    03/26/21 1337   BP: 126/90   Pulse: 91   Weight: 93 5 kg (206 lb 3 2 oz)   Height: 6' (1 829 m)     General Appearance:   Alert and oriented x 3   Cooperative, and in no respiratory distress   HEENT:   Normocephalic, atraumatic, anicteric      Neck:  Supple, symmetrical, trachea midline   Lungs:   Clear to auscultation bilaterally  Heart[de-identified]   Regular rate and rhythm   Abdomen:   Soft, non-tender, non-distended; normal bowel sounds; no masses, no organomegaly    Genitalia:   Deferred    Rectal:   Deferred    Extremities:  No cyanosis, clubbing or edema    Pulses:  2+ and symmetric all extremities    Skin:  Skin color, texture, turgor normal, no rashes or lesions    Lymph nodes:  No palpable cervical or supraclavicular lymphadenopathy        Lab Results:   Results from last 6 Months   Lab Units 03/11/21  1857   WBC Thousand/uL 4 91   HEMOGLOBIN g/dL 14 3   HEMATOCRIT % 42 4   PLATELETS Thousands/uL 269   NEUTROS PCT % 64   LYMPHS PCT % 27   MONOS PCT % 8   EOS PCT % 1     Results from last 6 Months   Lab Units 03/11/21  1857   POTASSIUM mmol/L 4 1   CHLORIDE mmol/L 104   CO2 mmol/L 33*   BUN mg/dL 11   CREATININE mg/dL 0 99   CALCIUM mg/dL 9 0   ALK PHOS U/L 89   ALT U/L 34   AST U/L 18         Results from last 6 Months   Lab Units 03/11/21  1857   LIPASE u/L 74       Imaging Studies: I have personally reviewed pertinent imaging studies  Ct Abdomen Pelvis With Contrast    Result Date: 3/11/2021  Impression: Peripelvic cysts are noted  Questionable nonobstructing 4 mm stone on the left  No hydronephrosis 1 cm nodular soft tissue structure seen in the underside of the stomach best noted on the coronal views (601:94, possibly a lymph node, nodule, diverticulum, or ulcer  Recommend clinical correlation and elective GI consultation  The study was marked in [facility] for immediate notification  Workstation performed: [id number]      ASSESSMENT and PLAN:      1)   Upper abdominal pain, nodular structure seen near the stomach on CT scan -  Unclear etiology at this time   - EGD to investigate  - If we are unable to identify the soft tissue density, may need EUS  Patient is willing to travel to [place] or [facility] if needed    - Will switch omeprazole to pantoprazole    2) Lower abdominal pain, new onset constipation - Patient  is chronically on tramadol, but dosing has not changed in the past 15 years  He has never had a colonoscopy  - Will trial Bentyl  - Colonoscopy to investigate   - Depending on bowel movement pattern, will discuss recommendations for regimen at next visit         Follow up after EGD/colonoscopy

## 2021-04-01 DIAGNOSIS — G60.9 IDIOPATHIC PERIPHERAL NEUROPATHY: ICD-10-CM

## 2021-04-01 RX ORDER — GABAPENTIN 600 MG/1
TABLET ORAL
Qty: 540 TABLET | Refills: 3 | Status: SHIPPED | OUTPATIENT
Start: 2021-04-01 | End: 2022-04-12

## 2021-04-02 DIAGNOSIS — R10.9 ABDOMINAL CRAMPING: ICD-10-CM

## 2021-04-02 RX ORDER — DICYCLOMINE HCL 20 MG
20 TABLET ORAL EVERY 6 HOURS PRN
Qty: 360 TABLET | Refills: 1 | Status: SHIPPED | OUTPATIENT
Start: 2021-04-02 | End: 2021-09-27

## 2021-04-13 ENCOUNTER — TELEPHONE (OUTPATIENT)
Dept: OTHER | Facility: OTHER | Age: 56
End: 2021-04-13

## 2021-04-14 ENCOUNTER — TELEPHONE (OUTPATIENT)
Dept: GASTROENTEROLOGY | Facility: CLINIC | Age: 56
End: 2021-04-14

## 2021-04-14 ENCOUNTER — PREP FOR PROCEDURE (OUTPATIENT)
Dept: GASTROENTEROLOGY | Facility: CLINIC | Age: 56
End: 2021-04-14

## 2021-04-14 ENCOUNTER — HOSPITAL ENCOUNTER (OUTPATIENT)
Dept: GASTROENTEROLOGY | Facility: HOSPITAL | Age: 56
Setting detail: OUTPATIENT SURGERY
Discharge: HOME | End: 2021-04-14
Attending: INTERNAL MEDICINE | Admitting: INTERNAL MEDICINE
Payer: COMMERCIAL

## 2021-04-14 ENCOUNTER — ANESTHESIA (OUTPATIENT)
Dept: GASTROENTEROLOGY | Facility: HOSPITAL | Age: 56
End: 2021-04-14

## 2021-04-14 ENCOUNTER — ANESTHESIA EVENT (OUTPATIENT)
Dept: GASTROENTEROLOGY | Facility: HOSPITAL | Age: 56
End: 2021-04-14

## 2021-04-14 VITALS
OXYGEN SATURATION: 97 % | HEIGHT: 76 IN | SYSTOLIC BLOOD PRESSURE: 125 MMHG | TEMPERATURE: 98.2 F | DIASTOLIC BLOOD PRESSURE: 81 MMHG | RESPIRATION RATE: 20 BRPM | HEART RATE: 78 BPM | BODY MASS INDEX: 25.02 KG/M2 | WEIGHT: 205.47 LBS

## 2021-04-14 DIAGNOSIS — K31.89 GASTRIC WALL THICKENING: ICD-10-CM

## 2021-04-14 DIAGNOSIS — K21.9 GASTROESOPHAGEAL REFLUX DISEASE, UNSPECIFIED WHETHER ESOPHAGITIS PRESENT: ICD-10-CM

## 2021-04-14 DIAGNOSIS — R10.9 ABDOMINAL CRAMPING: ICD-10-CM

## 2021-04-14 DIAGNOSIS — R10.9 ABDOMINAL CRAMPING: Primary | ICD-10-CM

## 2021-04-14 PROBLEM — I10 HTN (HYPERTENSION): Status: ACTIVE | Noted: 2021-04-14

## 2021-04-14 PROBLEM — E78.5 HYPERLIPIDEMIA: Status: ACTIVE | Noted: 2021-04-14

## 2021-04-14 PROCEDURE — 88305 TISSUE EXAM BY PATHOLOGIST: CPT | Performed by: PATHOLOGY

## 2021-04-14 PROCEDURE — 45378 DIAGNOSTIC COLONOSCOPY: CPT | Performed by: INTERNAL MEDICINE

## 2021-04-14 PROCEDURE — 43239 EGD BIOPSY SINGLE/MULTIPLE: CPT | Performed by: INTERNAL MEDICINE

## 2021-04-14 RX ORDER — SODIUM CHLORIDE, SODIUM LACTATE, POTASSIUM CHLORIDE, CALCIUM CHLORIDE 600; 310; 30; 20 MG/100ML; MG/100ML; MG/100ML; MG/100ML
125 INJECTION, SOLUTION INTRAVENOUS CONTINUOUS
Status: DISCONTINUED | OUTPATIENT
Start: 2021-04-14 | End: 2021-04-18 | Stop reason: HOSPADM

## 2021-04-14 RX ORDER — PROPOFOL 10 MG/ML
INJECTION, EMULSION INTRAVENOUS AS NEEDED
Status: DISCONTINUED | OUTPATIENT
Start: 2021-04-14 | End: 2021-04-14

## 2021-04-14 RX ORDER — LIDOCAINE HYDROCHLORIDE 10 MG/ML
INJECTION, SOLUTION EPIDURAL; INFILTRATION; INTRACAUDAL; PERINEURAL AS NEEDED
Status: DISCONTINUED | OUTPATIENT
Start: 2021-04-14 | End: 2021-04-14

## 2021-04-14 RX ADMIN — SODIUM CHLORIDE, SODIUM LACTATE, POTASSIUM CHLORIDE, AND CALCIUM CHLORIDE: .6; .31; .03; .02 INJECTION, SOLUTION INTRAVENOUS at 12:47

## 2021-04-14 RX ADMIN — PROPOFOL 220 MG: 10 INJECTION, EMULSION INTRAVENOUS at 13:21

## 2021-04-14 RX ADMIN — PROPOFOL 70 MG: 10 INJECTION, EMULSION INTRAVENOUS at 13:29

## 2021-04-14 RX ADMIN — PROPOFOL 70 MG: 10 INJECTION, EMULSION INTRAVENOUS at 13:25

## 2021-04-14 RX ADMIN — LIDOCAINE HYDROCHLORIDE 50 MG: 10 INJECTION, SOLUTION EPIDURAL; INFILTRATION; INTRACAUDAL; PERINEURAL at 13:21

## 2021-04-14 NOTE — TELEPHONE ENCOUNTER
----- Message from Vijaya Olivares MD sent at 4/14/2021  1:33 PM EDT -----  This patient needs an EUS for an abnormal CT scan; can you send [facility] for this?

## 2021-04-14 NOTE — TELEPHONE ENCOUNTER
Per Dr John Finders please call and schedule EUS for [facility]       Abnormal CT scan   Thanks

## 2021-04-14 NOTE — INTERVAL H&P NOTE
H&P reviewed  After examining the patient I find no changes in the patients condition since the H&P had been written      Vitals:    04/14/21 1159   BP: 134/87   Pulse: 68   Resp: 17   Temp: 97 8 °F (36 6 °C)   SpO2: 100%

## 2021-04-14 NOTE — TELEPHONE ENCOUNTER
Pt called in stating he was suppose to have a procedure scheduled tomorrow 4/14, he never received a call and even spoke to [place] [id number] staff, there is no record of this appt  Please advise

## 2021-04-14 NOTE — ANESTHESIA PREPROCEDURE EVALUATION
Procedure:  COLONOSCOPY  EGD    Relevant Problems   CARDIO   (+) HTN (hypertension)   (+) Hyperlipidemia        Physical Exam    Airway    Mallampati score: II  TM Distance: >3 FB  Neck ROM: full     Dental       Cardiovascular  Cardiovascular exam normal    Pulmonary  Pulmonary exam normal     Other Findings         Abdominal cramping [R10 9]       Gastroesophageal reflux disease, unspecified whether esophagitis      present [K21 9]       Gastric wall thickening [K31 89]  Anesthesia Plan  ASA Score- 2     Anesthesia Type- IV sedation with anesthesia with ASA Monitors  Additional Monitors:   Airway Plan:           Plan Factors-Exercise tolerance (METS): >4 METS  Chart reviewed  Existing labs reviewed  Patient summary reviewed  Induction- intravenous  Postoperative Plan-     Informed Consent- Anesthetic plan and risks discussed with patient  I personally reviewed this patient with the CRNA  Discussed and agreed on the Anesthesia Plan with the CRNA  Jett Louise

## 2021-04-20 ENCOUNTER — TELEPHONE (OUTPATIENT)
Dept: GASTROENTEROLOGY | Facility: CLINIC | Age: 56
End: 2021-04-20

## 2021-04-20 NOTE — TELEPHONE ENCOUNTER
----- Message from Derrick Gomez MD sent at 4/18/2021  8:10 AM EDT -----  Please tell him all of the biopsies were negative

## 2021-04-22 ENCOUNTER — PREP FOR PROCEDURE (OUTPATIENT)
Dept: GASTROENTEROLOGY | Facility: CLINIC | Age: 56
End: 2021-04-22

## 2021-04-22 DIAGNOSIS — R93.89 ABNORMAL CT SCAN: Primary | ICD-10-CM

## 2021-04-22 NOTE — TELEPHONE ENCOUNTER
EUS scheduled on 5/12/21 with Dr Romero at [facility]  I gave pt verbal instructions/mailed

## 2021-05-11 ENCOUNTER — TELEPHONE (OUTPATIENT)
Dept: GASTROENTEROLOGY | Facility: AMBULARY SURGERY CENTER | Age: 56
End: 2021-05-11

## 2021-05-11 NOTE — TELEPHONE ENCOUNTER
Patients GI provider:  Dr Jocelin Moore    Number to return call: ([phone]    Reason for call: Pt calling b/c he wants to cancel his EUS procedure for tomorrow     Scheduled procedure/appointment date if applicable: 4/66/05

## 2021-05-25 PROCEDURE — 88300 SURGICAL PATH GROSS: CPT | Performed by: PATHOLOGY

## 2021-05-26 ENCOUNTER — LAB REQUISITION (OUTPATIENT)
Dept: LAB | Facility: HOSPITAL | Age: 56
End: 2021-05-26
Payer: COMMERCIAL

## 2021-05-26 DIAGNOSIS — Z18.89 OTHER SPECIFIED RETAINED FOREIGN BODY FRAGMENTS: ICD-10-CM

## 2021-06-22 ENCOUNTER — TELEPHONE (OUTPATIENT)
Dept: NEUROLOGY | Facility: CLINIC | Age: 56
End: 2021-06-22

## 2021-06-22 NOTE — TELEPHONE ENCOUNTER
Received request for medical records from [facility]  Faxed to [facility] [place] [facility]\"" 6/22/2021

## 2021-06-25 DIAGNOSIS — K21.9 GASTROESOPHAGEAL REFLUX DISEASE, UNSPECIFIED WHETHER ESOPHAGITIS PRESENT: ICD-10-CM

## 2021-06-25 RX ORDER — PANTOPRAZOLE SODIUM 40 MG/1
TABLET, DELAYED RELEASE ORAL
Qty: 60 TABLET | Refills: 1 | Status: SHIPPED | OUTPATIENT
Start: 2021-06-25 | End: 2021-07-19

## 2021-07-02 ENCOUNTER — HOSPITAL ENCOUNTER (EMERGENCY)
Facility: HOSPITAL | Age: 56
Discharge: HOME | End: 2021-07-02
Attending: EMERGENCY MEDICINE | Admitting: EMERGENCY MEDICINE
Payer: COMMERCIAL

## 2021-07-02 ENCOUNTER — APPOINTMENT (EMERGENCY)
Dept: CT IMAGING | Facility: HOSPITAL | Age: 56
End: 2021-07-02
Payer: COMMERCIAL

## 2021-07-02 ENCOUNTER — APPOINTMENT (EMERGENCY)
Dept: ULTRASOUND IMAGING | Facility: HOSPITAL | Age: 56
End: 2021-07-02
Payer: COMMERCIAL

## 2021-07-02 VITALS
OXYGEN SATURATION: 97 % | RESPIRATION RATE: 11 BRPM | HEART RATE: 100 BPM | SYSTOLIC BLOOD PRESSURE: 133 MMHG | WEIGHT: 195 LBS | TEMPERATURE: 98.8 F | BODY MASS INDEX: 23.74 KG/M2 | DIASTOLIC BLOOD PRESSURE: 82 MMHG

## 2021-07-02 DIAGNOSIS — R10.11 RIGHT UPPER QUADRANT PAIN: ICD-10-CM

## 2021-07-02 DIAGNOSIS — K29.70 GASTRITIS: ICD-10-CM

## 2021-07-02 DIAGNOSIS — R10.9 ACUTE ABDOMINAL PAIN IN RIGHT FLANK: Primary | ICD-10-CM

## 2021-07-02 LAB
ALBUMIN SERPL BCP-MCNC: 3.8 G/DL (ref 3.5–5)
ALP SERPL-CCNC: 97 U/L (ref 46–116)
ALT SERPL W P-5'-P-CCNC: 26 U/L (ref 12–78)
ANION GAP SERPL CALCULATED.3IONS-SCNC: 8 MMOL/L (ref 4–13)
APTT PPP: 26 SECONDS (ref 23–37)
AST SERPL W P-5'-P-CCNC: 20 U/L (ref 5–45)
BASOPHILS # BLD AUTO: 0.02 THOUSANDS/ΜL (ref 0–0.1)
BASOPHILS NFR BLD AUTO: 0 % (ref 0–1)
BILIRUB DIRECT SERPL-MCNC: 0.08 MG/DL (ref 0–0.2)
BILIRUB SERPL-MCNC: 0.33 MG/DL (ref 0.2–1)
BILIRUB UR QL STRIP: NEGATIVE
BUN SERPL-MCNC: 9 MG/DL (ref 5–25)
CALCIUM SERPL-MCNC: 9 MG/DL (ref 8.3–10.1)
CHLORIDE SERPL-SCNC: 105 MMOL/L (ref 100–108)
CLARITY UR: CLEAR
CO2 SERPL-SCNC: 28 MMOL/L (ref 21–32)
COLOR UR: YELLOW
CREAT SERPL-MCNC: 0.91 MG/DL (ref 0.6–1.3)
EOSINOPHIL # BLD AUTO: 0.05 THOUSAND/ΜL (ref 0–0.61)
EOSINOPHIL NFR BLD AUTO: 1 % (ref 0–6)
ERYTHROCYTE [DISTWIDTH] IN BLOOD BY AUTOMATED COUNT: 12 % (ref 11.6–15.1)
GFR SERPL CREATININE-BSD FRML MDRD: 95 ML/MIN/1.73SQ M
GLUCOSE SERPL-MCNC: 119 MG/DL (ref 65–140)
GLUCOSE UR STRIP-MCNC: NEGATIVE MG/DL
HCT VFR BLD AUTO: 44.6 % (ref 36.5–49.3)
HGB BLD-MCNC: 15.3 G/DL (ref 12–17)
HGB UR QL STRIP.AUTO: NEGATIVE
IMM GRANULOCYTES # BLD AUTO: 0.01 THOUSAND/UL (ref 0–0.2)
IMM GRANULOCYTES NFR BLD AUTO: 0 % (ref 0–2)
INR PPP: 0.95 (ref 0.84–1.19)
KETONES UR STRIP-MCNC: NEGATIVE MG/DL
LACTATE SERPL-SCNC: 1.5 MMOL/L (ref 0.5–2)
LEUKOCYTE ESTERASE UR QL STRIP: NEGATIVE
LIPASE SERPL-CCNC: 66 U/L (ref 73–393)
LYMPHOCYTES # BLD AUTO: 1.03 THOUSANDS/ΜL (ref 0.6–4.47)
LYMPHOCYTES NFR BLD AUTO: 19 % (ref 14–44)
MAGNESIUM SERPL-MCNC: 2.1 MG/DL (ref 1.6–2.6)
MCH RBC QN AUTO: 31.6 PG (ref 26.8–34.3)
MCHC RBC AUTO-ENTMCNC: 34.3 G/DL (ref 31.4–37.4)
MCV RBC AUTO: 92 FL (ref 82–98)
MONOCYTES # BLD AUTO: 0.37 THOUSAND/ΜL (ref 0.17–1.22)
MONOCYTES NFR BLD AUTO: 7 % (ref 4–12)
NEUTROPHILS # BLD AUTO: 3.88 THOUSANDS/ΜL (ref 1.85–7.62)
NEUTS SEG NFR BLD AUTO: 73 % (ref 43–75)
NITRITE UR QL STRIP: NEGATIVE
NRBC BLD AUTO-RTO: 0 /100 WBCS
PH UR STRIP.AUTO: 7.5 [PH]
PLATELET # BLD AUTO: 254 THOUSANDS/UL (ref 149–390)
PMV BLD AUTO: 10.6 FL (ref 8.9–12.7)
POTASSIUM SERPL-SCNC: 4.5 MMOL/L (ref 3.5–5.3)
PROT SERPL-MCNC: 7.3 G/DL (ref 6.4–8.2)
PROT UR STRIP-MCNC: NEGATIVE MG/DL
PROTHROMBIN TIME: 12.2 SECONDS (ref 11.6–14.5)
RBC # BLD AUTO: 4.84 MILLION/UL (ref 3.88–5.62)
SODIUM SERPL-SCNC: 141 MMOL/L (ref 136–145)
SP GR UR STRIP.AUTO: 1.01 (ref 1–1.03)
TROPONIN I SERPL-MCNC: <0.02 NG/ML
UROBILINOGEN UR QL STRIP.AUTO: 0.2 E.U./DL
WBC # BLD AUTO: 5.36 THOUSAND/UL (ref 4.31–10.16)

## 2021-07-02 PROCEDURE — 83735 ASSAY OF MAGNESIUM: CPT | Performed by: EMERGENCY MEDICINE

## 2021-07-02 PROCEDURE — 99285 EMERGENCY DEPT VISIT HI MDM: CPT | Performed by: EMERGENCY MEDICINE

## 2021-07-02 PROCEDURE — 83605 ASSAY OF LACTIC ACID: CPT | Performed by: EMERGENCY MEDICINE

## 2021-07-02 PROCEDURE — 81003 URINALYSIS AUTO W/O SCOPE: CPT | Performed by: EMERGENCY MEDICINE

## 2021-07-02 PROCEDURE — 85610 PROTHROMBIN TIME: CPT | Performed by: EMERGENCY MEDICINE

## 2021-07-02 PROCEDURE — 99284 EMERGENCY DEPT VISIT MOD MDM: CPT

## 2021-07-02 PROCEDURE — 96361 HYDRATE IV INFUSION ADD-ON: CPT

## 2021-07-02 PROCEDURE — 76705 ECHO EXAM OF ABDOMEN: CPT

## 2021-07-02 PROCEDURE — 85025 COMPLETE CBC W/AUTO DIFF WBC: CPT | Performed by: EMERGENCY MEDICINE

## 2021-07-02 PROCEDURE — 83690 ASSAY OF LIPASE: CPT | Performed by: EMERGENCY MEDICINE

## 2021-07-02 PROCEDURE — 85730 THROMBOPLASTIN TIME PARTIAL: CPT | Performed by: EMERGENCY MEDICINE

## 2021-07-02 PROCEDURE — 96374 THER/PROPH/DIAG INJ IV PUSH: CPT

## 2021-07-02 PROCEDURE — 84484 ASSAY OF TROPONIN QUANT: CPT | Performed by: EMERGENCY MEDICINE

## 2021-07-02 PROCEDURE — 74177 CT ABD & PELVIS W/CONTRAST: CPT

## 2021-07-02 PROCEDURE — 36415 COLL VENOUS BLD VENIPUNCTURE: CPT | Performed by: EMERGENCY MEDICINE

## 2021-07-02 PROCEDURE — 80048 BASIC METABOLIC PNL TOTAL CA: CPT | Performed by: EMERGENCY MEDICINE

## 2021-07-02 PROCEDURE — 71275 CT ANGIOGRAPHY CHEST: CPT

## 2021-07-02 PROCEDURE — 80076 HEPATIC FUNCTION PANEL: CPT | Performed by: EMERGENCY MEDICINE

## 2021-07-02 RX ORDER — SUCRALFATE 1 G/1
1 TABLET ORAL
Qty: 20 TABLET | Refills: 0 | Status: SHIPPED | OUTPATIENT
Start: 2021-07-02 | End: 2022-03-22

## 2021-07-02 RX ORDER — KETOROLAC TROMETHAMINE 30 MG/ML
15 INJECTION, SOLUTION INTRAMUSCULAR; INTRAVENOUS ONCE
Status: COMPLETED | OUTPATIENT
Start: 2021-07-02 | End: 2021-07-02

## 2021-07-02 RX ADMIN — KETOROLAC TROMETHAMINE 15 MG: 30 INJECTION, SOLUTION INTRAMUSCULAR at 16:16

## 2021-07-02 RX ADMIN — IOHEXOL 100 ML: 350 INJECTION, SOLUTION INTRAVENOUS at 17:07

## 2021-07-02 RX ADMIN — SODIUM CHLORIDE 1000 ML: 0.9 INJECTION, SOLUTION INTRAVENOUS at 16:16

## 2021-07-02 NOTE — DISCHARGE INSTRUCTIONS
Abdominal Pain   WHAT YOU NEED TO KNOW:   Abdominal pain can be dull, achy, or sharp  You may have pain in one area of your abdomen, or in your entire abdomen  Your pain may be caused by a condition such as constipation, food sensitivity or poisoning, infection, or a blockage  Abdominal pain can also be from a hernia, appendicitis, or an ulcer  Liver, gallbladder, or kidney conditions can also cause abdominal pain  The cause of your abdominal pain may be unknown  DISCHARGE INSTRUCTIONS:   Return to the emergency department if:   · You have new chest pain or shortness of breath  · You have pulsing pain in your upper abdomen or lower back that suddenly becomes constant  · Your pain is in the right lower abdominal area and worsens with movement  · You have a fever over 100 4°F (38°C) or shaking chills  · You are vomiting and cannot keep food or liquids down  · Your pain does not improve or gets worse over the next 8 to 12 hours  · You see blood in your vomit or bowel movements, or they look black and tarry  · Your skin or the whites of your eyes turn yellow  · You are a woman and have a large amount of vaginal bleeding that is not your monthly period  Contact your healthcare provider if:   · You have pain in your lower back  · You are a man and have pain in your testicles  · You have pain when you urinate  · You have questions or concerns about your condition or care  Follow up with your healthcare provider within 24 hours or as directed:  Write down your questions so you remember to ask them during your visits  Medicines:   · Medicines  may be given to calm your stomach and prevent vomiting or to decrease pain  Ask how to take pain medicine safely  · Take your medicine as directed  Contact your healthcare provider if you think your medicine is not helping or if you have side effects  Tell him of her if you are allergic to any medicine   Keep a list of the medicines, vitamins, and herbs you take  Include the amounts, and when and why you take them  Bring the list or the pill bottles to follow-up visits  Carry your medicine list with you in case of an emergency  © Copyright [address] Information is for End User's use only and may not be sold, redistributed or otherwise used for commercial purposes  All illustrations and images included in CareNotes® are the copyrighted property of A [employer] A M , Inc  or Lorri Cisneros   The above information is an  only  It is not intended as medical advice for individual conditions or treatments  Talk to your doctor, nurse or pharmacist before following any medical regimen to see if it is safe and effective for you  Gastritis   WHAT YOU NEED TO KNOW:   Gastritis is inflammation or irritation of the lining of your stomach  DISCHARGE INSTRUCTIONS:   Call 911 for any of the following:   · You develop chest pain or shortness of breath  Seek care immediately if:   · You vomit blood  · You have black or bloody bowel movements  · You have severe stomach or back pain  Contact your healthcare provider if:   · You have a fever  · You have new or worsening symptoms, even after treatment  · You have questions or concerns about your condition or care  Medicines:   · Medicines  may be given to help treat a bacterial infection or decrease stomach acid  · Take your medicine as directed  Contact your healthcare provider if you think your medicine is not helping or if you have side effects  Tell him or her if you are allergic to any medicine  Keep a list of the medicines, vitamins, and herbs you take  Include the amounts, and when and why you take them  Bring the list or the pill bottles to follow-up visits  Carry your medicine list with you in case of an emergency  Manage or prevent gastritis:   · Do not smoke    Nicotine and other chemicals in cigarettes and cigars can make your symptoms worse and cause lung damage  Ask your healthcare provider for information if you currently smoke and need help to quit  E-cigarettes or smokeless tobacco still contain nicotine  Talk to your healthcare provider before you use these products  · Do not drink alcohol  Alcohol can prevent healing and make your gastritis worse  Talk to your healthcare provider if you need help to stop drinking  · Do not take NSAIDs or aspirin unless directed  These and similar medicines can cause irritation  If your healthcare provider says it is okay to take NSAIDs, take them with food  · Do not eat foods that cause irritation  Foods such as oranges and salsa can cause burning or pain  Eat a variety of healthy foods  Examples include fruits (not citrus), vegetables, low-fat dairy products, beans, whole-grain breads, and lean meats and fish  Try to eat small meals, and drink water with your meals  Do not eat for at least 3 hours before you go to bed  · Find ways to relax and decrease stress  Stress can increase stomach acid and make gastritis worse  Activities such as yoga, meditation, or listening to music can help you relax  Spend time with friends, or do things you enjoy  Follow up with your healthcare provider as directed: You may need ongoing tests or treatment, or referral to a gastroenterologist  Write down your questions so you remember to ask them during your visits  © Copyright [address] Information is for End User's use only and may not be sold, redistributed or otherwise used for commercial purposes  All illustrations and images included in CareNotes® are the copyrighted property of [employer]  or [facility] Layo Cisneros   The above information is an  only  It is not intended as medical advice for individual conditions or treatments  Talk to your doctor, nurse or pharmacist before following any medical regimen to see if it is safe and effective for you

## 2021-07-02 NOTE — ED PROVIDER NOTES
History  Chief Complaint   Patient presents with    Abdominal Pain     Patient presents to ED with co abdominal pain x 3 days  Patient reports abdominal pain begun radiating to right flank  Denies N/V/D  Patient is a 71-year-old male with past medical history of hypertension, hyperlipidemia, left rotator cuff surgery, 2 prior knee arthroscopic surgeries, prior wrist surgery, chronic orthopedic related pain on tramadol and muscle relaxer at home, presents to the emergency department complaining of right upper quadrant abdominal pain radiating to the right mid back  Patient states that for the past several months he has had upper abdominal pain on and off  He was seen by Dr Josh Elise with GI and had an endoscopy and colonoscopy in April 2021, showing mild non-erosive gastritis and a few scattered colonic diverticuli  She was started on Bentyl and Pepcid which does give him some relief  He states over the past 3 days he has had more sharp pain localized to the right upper quadrant with radiation into his back  He denies any worsening after food intake or any alleviating or aggravating factors  He denies ever having pain like this in his back before which concerned him and brought him to the ER  When asking about chest pain he does point to his right lower chest/right upper quadrant which is where he is experiencing the pain but denies any central or left-sided chest pain  He denies any diaphoresis, fever, chills, headache, dizziness or near syncope, cough, hemoptysis, URI symptoms, palpitations, dyspnea, pain with breathing, abdominal distension, nausea, vomiting, diarrhea, constipation, bright red blood per rectum, melena, dysuria, change in urinary frequency, hematuria, testicular pain or swelling, skin rash or color change, extremity weakness or paresthesia or other focal neurologic deficits  Denies any prior abdominal surgeries        History provided by:  Patient and medical records  Language  used: No    Abdominal Pain  Associated symptoms: no chest pain, no chills, no constipation, no cough, no diarrhea, no dysuria, no fever, no hematuria, no nausea, no shortness of breath, no sore throat and no vomiting        Prior to Admission Medications   Prescriptions Last Dose Informant Patient Reported? Taking?    AJOVY 225 MG/1 5ML SOSY  Self Yes No   Sig: Inject 225 mg under the skin every 30 (thirty) days   Diclofenac Sodium 1 6 % GEL  Self Yes No   Sig: Place on the skin as needed   amLODIPine (NORVASC) 2 5 mg tablet  Self Yes No   Sig: Take 2 5 mg by mouth daily   celecoxib (CeleBREX) 200 mg capsule  Self Yes No   Sig: Take 200 mg by mouth daily   dicyclomine (BENTYL) 20 mg tablet   No No   Sig: TAKE 1 TABLET (20 MG TOTAL) BY MOUTH EVERY 6 (SIX) HOURS AS NEEDED (ABDOMINAL PAIN)   fluticasone (FLONASE) 50 mcg/act nasal spray  Self Yes No   Sig: fluticasone propionate 50 mcg/actuation nasal spray,suspension   SPRAY 2 SPRAYS INTO EACH NOSTRIL EVERY DAY   gabapentin (NEURONTIN) 600 MG tablet   No No   Sig: TAKE 2 TABLETS BY MOUTH 3  TIMES DAILY   loratadine-pseudoephedrine (CLARITIN-D 24-HOUR)  mg per 24 hr tablet  Self Yes No   Sig: Take 1 tablet by mouth daily   losartan (COZAAR) 100 MG tablet  Self Yes No   metaxalone (SKELAXIN) 800 mg tablet  Self Yes No   Sig: Take 800 mg by mouth 3 (three) times a day   naproxen (NAPROSYN) 500 mg tablet  Self No No   Sig: Take 1 tablet (500 mg total) by mouth every 12 (twelve) hours as needed for mild pain or moderate pain   pantoprazole (PROTONIX) 40 mg tablet   No No   Sig: TAKE 1 TABLET BY MOUTH TWICE A DAY 30-60 MINUTES BEFORE BREAKFAST AND DINNER   rosuvastatin (CRESTOR) 5 mg tablet  Self Yes No   Sig: Take 1 tablet by mouth daily   solifenacin (VESICARE) 5 mg tablet  Self Yes No   Sig: Take 5 mg by mouth daily   traMADol (ULTRAM-ER) 300 MG 24 hr tablet  Self Yes No   Sig: Take 300 mg by mouth daily      Facility-Administered Medications: None Past Medical History:   Diagnosis Date    Hyperlipidemia     Hypertension        Past Surgical History:   Procedure Laterality Date    ANKLE LIGAMENT RECONSTRUCTION Right 2008    ARTHROSCOPY KNEE Left 05/06/2019    x2 prior tear 2018    KNEE SURGERY Right     ROTATOR CUFF REPAIR Left 2017    4 anchors    SHOULDER SURGERY      ULNAR NERVE TRANSPOSITION Bilateral 2010    WRIST FUSION Right 12/06/2019    WRIST SURGERY Right 2017/18       Family History   Problem Relation Age of Onset    Hypertension Mother     Hypertension Father     Kidney cancer Father      I have reviewed and agree with the history as documented  E-Cigarette/Vaping    E-Cigarette Use Never User      E-Cigarette/Vaping Substances     Social History     Tobacco Use    Smoking status: Former Smoker    Smokeless tobacco: Never Used   Vaping Use    Vaping Use: Never used   Substance Use Topics    Alcohol use: Yes     Alcohol/week: 2 0 standard drinks     Types: 2 Cans of beer per week     Comment: socially    Drug use: No       Review of Systems   Constitutional: Negative for appetite change, chills, diaphoresis and fever  HENT: Negative for congestion, ear pain, rhinorrhea and sore throat  Respiratory: Negative for cough, chest tightness, shortness of breath and wheezing  Cardiovascular: Negative for chest pain, palpitations and leg swelling  Gastrointestinal: Positive for abdominal pain  Negative for abdominal distention, blood in stool, constipation, diarrhea, nausea and vomiting  Genitourinary: Negative for difficulty urinating, dysuria, flank pain, frequency, hematuria, scrotal swelling and testicular pain  Musculoskeletal: Positive for back pain  Negative for neck pain and neck stiffness  Skin: Negative for color change, pallor, rash and wound  Allergic/Immunologic: Negative for immunocompromised state  Neurological: Negative for dizziness, syncope, weakness, light-headedness, numbness and headaches  Hematological: Negative for adenopathy  Psychiatric/Behavioral: Negative for confusion and decreased concentration  All other systems reviewed and are negative  Physical Exam  Physical Exam  Vitals and nursing note reviewed  Constitutional:       General: He is not in acute distress  Appearance: Normal appearance  He is well-developed  He is not ill-appearing, toxic-appearing or diaphoretic  HENT:      Head: Normocephalic and atraumatic  Right Ear: External ear normal       Left Ear: External ear normal       Mouth/Throat:      Comments: Orpharyngeal exam deferred at this time due to risk of exposure to COVID-19 during current pandemic  Patient has no oropharyngeal complaints  Eyes:      Extraocular Movements: Extraocular movements intact  Conjunctiva/sclera: Conjunctivae normal    Neck:      Vascular: No JVD  Cardiovascular:      Rate and Rhythm: Regular rhythm  Tachycardia present  Pulses: Normal pulses  Heart sounds: Normal heart sounds  No murmur heard  No friction rub  No gallop  Pulmonary:      Effort: Pulmonary effort is normal  No respiratory distress  Breath sounds: Normal breath sounds  No wheezing, rhonchi or rales  Chest:      Chest wall: No tenderness  Abdominal:      General: Bowel sounds are normal  There is no distension  Palpations: Abdomen is soft  There is no mass  Tenderness: There is abdominal tenderness  There is right CVA tenderness  There is no left CVA tenderness, guarding or rebound  Comments: +Tenderness in LLQ, suprapubic region and RUQ  Negative Espinosa's sign  No tenderness at McBurney's point  Musculoskeletal:         General: No swelling or tenderness  Normal range of motion  Cervical back: Normal range of motion and neck supple  No rigidity  Skin:     General: Skin is warm and dry  Coloration: Skin is not pale  Findings: No erythema or rash     Neurological:      General: No focal deficit present  Mental Status: He is alert and oriented to person, place, and time  Sensory: No sensory deficit  Motor: No weakness     Psychiatric:         Mood and Affect: Mood normal          Behavior: Behavior normal          Vital Signs  ED Triage Vitals [07/02/21 1538]   Temperature Pulse Respirations Blood Pressure SpO2   98 8 °F (37 1 °C) (!) 119 18 153/93 98 %      Temp Source Heart Rate Source Patient Position - Orthostatic VS BP Location FiO2 (%)   Oral Monitor Sitting Left arm --      Pain Score       7         Vitals:    07/02/21 1538 07/02/21 1630 07/02/21 1715 07/02/21 1845   BP: 153/93 133/76 149/70 131/81   BP Location: Left arm Left arm Left arm Right arm   Pulse: (!) 119 91 93 92   Resp: 18 12 15 16   Temp: 98 8 °F (37 1 °C)      TempSrc: Oral      SpO2: 98% 96% 99% 97%   Weight: 88 5 kg (195 lb)          Visual Acuity      ED Medications  Medications   sodium chloride 0 9 % bolus 1,000 mL (1,000 mL Intravenous New Bag 7/2/21 1616)   ketorolac (TORADOL) injection 15 mg (15 mg Intravenous Given 7/2/21 1616)   iohexol (OMNIPAQUE) 350 MG/ML injection (SINGLE-DOSE) 100 mL (100 mL Intravenous Given 7/2/21 1707)       Diagnostic Studies  Results Reviewed     Procedure Component Value Units Date/Time    UA (URINE) with reflex to Scope [[id number]] Collected: 07/02/21 1724    Lab Status: Final result Specimen: Urine, Other Updated: 07/02/21 1733     Color, UA Yellow     Clarity, UA Clear     Specific Gravity, UA 1 015     pH, UA 7 5     Leukocytes, UA Negative     Nitrite, UA Negative     Protein, UA Negative mg/dl      Glucose, UA Negative mg/dl      Ketones, UA Negative mg/dl      Urobilinogen, UA 0 2 E U /dl      Bilirubin, UA Negative     Blood, UA Negative    Lactic acid [[id number]]  (Normal) Collected: 07/02/21 1609    Lab Status: Final result Specimen: Blood from Arm, Left Updated: 07/02/21 1707     LACTIC ACID 1 5 mmol/L     Narrative:      Result may be elevated if tourniquet was used during collection      Troponin I [[id number]]  (Normal) Collected: 07/02/21 1609    Lab Status: Final result Specimen: Blood from Arm, Left Updated: 07/02/21 1706     Troponin I <0 02 ng/mL     Basic metabolic panel [[id number]] Collected: 07/02/21 1609    Lab Status: Final result Specimen: Blood from Arm, Left Updated: 07/02/21 1643     Sodium 141 mmol/L      Potassium 4 5 mmol/L      Chloride 105 mmol/L      CO2 28 mmol/L      ANION GAP 8 mmol/L      BUN 9 mg/dL      Creatinine 0 91 mg/dL      Glucose 119 mg/dL      Calcium 9 0 mg/dL      eGFR 95 ml/min/1 73sq m     Narrative:      [place] guidelines for Chronic Kidney Disease (CKD):     Stage 1 with normal or high GFR (GFR > 90 mL/min/1 73 square meters)    Stage 2 Mild CKD (GFR = 60-89 mL/min/1 73 square meters)    Stage 3A Moderate CKD (GFR = 45-59 mL/min/1 73 square meters)    Stage 3B Moderate CKD (GFR = 30-44 mL/min/1 73 square meters)    Stage 4 Severe CKD (GFR = 15-29 mL/min/1 73 square meters)    Stage 5 End Stage CKD (GFR <15 mL/min/1 73 square meters)  Note: GFR calculation is accurate only with a steady state creatinine    Hepatic function panel [[id number]]  (Normal) Collected: 07/02/21 1609    Lab Status: Final result Specimen: Blood from Arm, Left Updated: 07/02/21 1643     Total Bilirubin 0 33 mg/dL      Bilirubin, Direct 0 08 mg/dL      Alkaline Phosphatase 97 U/L      AST 20 U/L      ALT 26 U/L      Total Protein 7 3 g/dL      Albumin 3 8 g/dL     Magnesium [[id number]]  (Normal) Collected: 07/02/21 1609    Lab Status: Final result Specimen: Blood from Arm, Left Updated: 07/02/21 1643     Magnesium 2 1 mg/dL     Lipase [[id number]]  (Abnormal) Collected: 07/02/21 1609    Lab Status: Final result Specimen: Blood from Arm, Left Updated: 07/02/21 1643     Lipase 66 u/L     Protime-INR [[id number]]  (Normal) Collected: 07/02/21 1609    Lab Status: Final result Specimen: Blood from Arm, Left Updated: 07/02/21 1639 Protime 12 2 seconds      INR 0 95    APTT [[id number]]  (Normal) Collected: 07/02/21 1609    Lab Status: Final result Specimen: Blood from Arm, Left Updated: 07/02/21 1630     PTT 26 seconds     CBC and differential [[id number]] Collected: 07/02/21 1609    Lab Status: Final result Specimen: Blood from Arm, Left Updated: 07/02/21 1626     WBC 5 36 Thousand/uL      RBC 4 84 Million/uL      Hemoglobin 15 3 g/dL      Hematocrit 44 6 %      MCV 92 fL      MCH 31 6 pg      MCHC 34 3 g/dL      RDW 12 0 %      MPV 10 6 fL      Platelets 199 Thousands/uL      nRBC 0 /100 WBCs      Neutrophils Relative 73 %      Immat GRANS % 0 %      Lymphocytes Relative 19 %      Monocytes Relative 7 %      Eosinophils Relative 1 %      Basophils Relative 0 %      Neutrophils Absolute 3 88 Thousands/µL      Immature Grans Absolute 0 01 Thousand/uL      Lymphocytes Absolute 1 03 Thousands/µL      Monocytes Absolute 0 37 Thousand/µL      Eosinophils Absolute 0 05 Thousand/µL      Basophils Absolute 0 02 Thousands/µL                  US gallbladder   Final Result by Medardo Lugo MD (07/02 1840)      No evidence for cholelithiasis  Negative sonographic Espinosa sign  3 mm gallbladder polyp  According to current literature guidelines (JACR 2013;10:953-956) small polyps this size are benign and no workup or followup is needed  Workstation performed: [id number]         PE Study with CT Abdomen and Pelvis with contrast   Final Result by Abdullahi Moore DO (07/02 1922)   No central pulmonary embolus  No aortic aneurysm or dissection  No evidence of right heart strain  Nonobstructing 2 mm calculus left kidney  No renal colic  Enlarged prostate indenting the bladder base        No intra-abdominal pathology identified to explain patient's symptoms      Workstation performed: [id number]                    Procedures  ECG 12 Lead Documentation Only    Date/Time: 7/2/2021 4:22 PM  Performed by: Chanda Copeland DO  Authorized by: Latasha Bedoya MARTÍN Gerardo DO     ECG reviewed by me, the ED Provider: yes    Patient location:  ED  Previous ECG:     Previous ECG:  Unavailable  Interpretation:     Interpretation: normal    Rate:     ECG rate:  93    ECG rate assessment: normal    Rhythm:     Rhythm: sinus rhythm    Ectopy:     Ectopy: none    QRS:     QRS axis:  Normal    QRS intervals:  Normal  Conduction:     Conduction: normal    ST segments:     ST segments:  Normal  T waves:     T waves: normal               ED Course  ED Course as of Jul 02 1941 Fri Jul 02, 2021   1757 Updated patient about normal workup thus far  Waiting on CT imaging results as well as ultrasound gallbladder  Patient reports pain overall is improved  1939 Updated patient about essentially normal workup including CT scan  Patient already follows with Dr Ruy Whitt and I encouraged close follow-up with him as there are other tests such as HIDA scan that he can do for definitive rule out of gallbladder dysfunction  His symptoms could also be secondary to known gastritis and encouraged him to continue taking the medications prescribed by GI and will add on Carafate  Patient also takes tramadol for his orthopedic pain and explained that he could take this for his abdominal pain as well  Advised avoidance of NSAIDs given history of gastritis and patient does state NSAIDs usually upset his stomach so he avoids them anyway  Discussed ED return parameters  SBIRT 20yo+      Most Recent Value   SBIRT (22 yo +)   In order to provide better care to our patients, we are screening all of our patients for alcohol and drug use  Would it be okay to ask you these screening questions? Yes Filed at: 07/02/2021 1552   Initial Alcohol Screen: US AUDIT-C    1  How often do you have a drink containing alcohol?  0 Filed at: 07/02/2021 1552   2  How many drinks containing alcohol do you have on a typical day you are drinking? 0 Filed at: 07/02/2021 1552   3a   Male UNDER 65: How often do you have five or more drinks on one occasion? 0 Filed at: 07/02/2021 1552   3b  FEMALE Any Age, or MALE 65+: How often do you have 4 or more drinks on one occassion? 0 Filed at: 07/02/2021 1552   Audit-C Score  0 Filed at: 07/02/2021 1552   CLEMENTINE: How many times in the past year have you    Used an illegal drug or used a prescription medication for non-medical reasons? Never Filed at: 07/02/2021 1552                    MDM  Number of Diagnoses or Management Options  Diagnosis management comments: 50-year-old male with history of hypertension, hyperlipidemia, chronic orthopedic related pain, presents for 3 days of right upper quadrant pain radiating to the right back/flank region  He has had recent upper abdominal pain and diagnosed with mild gastritis and diverticulosis on recent endoscopy and colonoscopy  Symptoms could be related to gastritis or peptic ulcer disease  Differential also includes biliary colic, cholecystitis, renal colic secondary to stone, UTI pyelonephritis, diverticulitis, ACS, pleurisy from pneumonia, PE, effusion, aortic dissection  Given that patient is tachycardic will workup with CTA chest and CT abdomen and pelvis  Will also obtain gallbladder ultrasound  Will check cardiac and abdominal labs, UA  Will give IV fluids, Toradol for pain         Amount and/or Complexity of Data Reviewed  Clinical lab tests: ordered and reviewed  Tests in the radiology section of CPT®: ordered and reviewed  Tests in the medicine section of CPT®: ordered and reviewed  Review and summarize past medical records: yes  Independent visualization of images, tracings, or specimens: yes        Disposition  Final diagnoses:   Acute abdominal pain in right flank   Right upper quadrant pain   Gastritis     Time reflects when diagnosis was documented in both MDM as applicable and the Disposition within this note     Time User Action Codes Description Comment    7/2/2021  7:40 PM Akin Clarisa E Add [R10 9] Acute abdominal pain in right flank     7/2/2021  7:40 PM Alonza Rides E Add [R10 11] Right upper quadrant pain     7/2/2021  7:40 PM Alonza Rides E Add [K29 70] Gastritis       ED Disposition     ED Disposition Condition Date/Time Comment    Discharge Stable Fri Jul 2, 2021  7:40 PM [address] [place] [address] discharge to home/self care  Follow-up Information     Follow up With Specialties Details Why Contact Info Additional Information    Maricel Parker MD Internal Medicine Schedule an appointment as soon as possible for a visit   [address] [facility] 109       Khari Woodard MD Gastroenterology Schedule an appointment as soon as possible for a visit   [address] [place] [address] [phone] [facility] Emergency Department Emergency Medicine Go to  If symptoms worsen [id number] [facility]  [id number] [place] [address] Emergency Department, [address] [place], [place], [facility]          Patient's Medications   Discharge Prescriptions    SUCRALFATE (CARAFATE) 1 G TABLET    Take 1 tablet (1 g total) by mouth 4 (four) times a day (before meals and at bedtime)       Start Date: 7/2/2021  End Date: --       Order Dose: 1 g       Quantity: 20 tablet    Refills: 0     No discharge procedures on file      PDMP Review     None          ED Provider  Electronically Signed by           Tonia Jorgensen DO  07/02/21 1942

## 2021-07-05 ENCOUNTER — HOSPITAL ENCOUNTER (EMERGENCY)
Facility: HOSPITAL | Age: 56
Discharge: HOME | End: 2021-07-05
Attending: EMERGENCY MEDICINE | Admitting: EMERGENCY MEDICINE
Payer: COMMERCIAL

## 2021-07-05 VITALS
TEMPERATURE: 98.5 F | SYSTOLIC BLOOD PRESSURE: 148 MMHG | HEART RATE: 96 BPM | DIASTOLIC BLOOD PRESSURE: 93 MMHG | OXYGEN SATURATION: 98 % | RESPIRATION RATE: 20 BRPM

## 2021-07-05 DIAGNOSIS — B02.9 SHINGLES: Primary | ICD-10-CM

## 2021-07-05 PROCEDURE — 99284 EMERGENCY DEPT VISIT MOD MDM: CPT | Performed by: PHYSICIAN ASSISTANT

## 2021-07-05 PROCEDURE — 99282 EMERGENCY DEPT VISIT SF MDM: CPT

## 2021-07-05 RX ORDER — ACYCLOVIR 800 MG/1
800 TABLET ORAL
Qty: 35 TABLET | Refills: 0 | Status: SHIPPED | OUTPATIENT
Start: 2021-07-05 | End: 2021-12-22 | Stop reason: ALTCHOICE

## 2021-07-05 RX ORDER — OXYCODONE HYDROCHLORIDE AND ACETAMINOPHEN 5; 325 MG/1; MG/1
1 TABLET ORAL EVERY 6 HOURS PRN
Qty: 20 TABLET | Refills: 0 | Status: SHIPPED | OUTPATIENT
Start: 2021-07-05 | End: 2021-12-22 | Stop reason: ALTCHOICE

## 2021-07-05 NOTE — ED PROVIDER NOTES
History  Chief Complaint   Patient presents with    Rash     pt comes to ed with rash on abdomen, rightside flank pain   Seen here Friday with abdominal pain and nausea      13-year-old male with past medical history significant for hypertension and hyperlipidemia presents to the emergency department with chief complaint of painful rash to the right side of his abdomen and flank  Onset of rash symptoms reported as this morning however patient reports he has been experiencing right-sided abdominal and flank pain for the past 3 days  Location of symptoms reported as the right side of the abdomen wrapping around to the right flank  Quality is reported as sharp painful rash  Severity reported as moderate  Associated symptoms:  Positive for nausea  Denies vomiting  Denies diarrhea  Denies constipation  Denies fevers or chills  Modifying factors: Movement seems to exacerbate pain  Nothing has been tried for treatment of symptoms  Context:  Patient denies any recent fall injury or trauma to the area  Denies any environmental exposures  Denies any prior similar episodes in the past   Patient reports he was seen in the emergency department 3 days ago and underwent a workup including a CT scan of the abdomen and pelvis but they did not seem to find a source for his symptoms  Since he was discharged home he reports this morning he developed a red rash to the right side of the abdomen and flank prompting him to come back to the emergency department  Old charts reviewed:  Patient was last seen in the emergency department on 07/02/2021 for evaluation of abdominal pain        History provided by:  Patient   used: No    Rash  Associated symptoms: abdominal pain and nausea    Associated symptoms: no diarrhea, no fatigue, no fever, no headaches, no joint pain, no myalgias, no shortness of breath, no sore throat, not vomiting and not wheezing        Prior to Admission Medications   Prescriptions Last Dose Informant Patient Reported? Taking?    AJOVY 225 MG/1 5ML SOSY  Self Yes No   Sig: Inject 225 mg under the skin every 30 (thirty) days   Diclofenac Sodium 1 6 % GEL  Self Yes No   Sig: Place on the skin as needed   amLODIPine (NORVASC) 2 5 mg tablet  Self Yes No   Sig: Take 2 5 mg by mouth daily   celecoxib (CeleBREX) 200 mg capsule  Self Yes No   Sig: Take 200 mg by mouth daily   dicyclomine (BENTYL) 20 mg tablet   No No   Sig: TAKE 1 TABLET (20 MG TOTAL) BY MOUTH EVERY 6 (SIX) HOURS AS NEEDED (ABDOMINAL PAIN)   fluticasone (FLONASE) 50 mcg/act nasal spray  Self Yes No   Sig: fluticasone propionate 50 mcg/actuation nasal spray,suspension   SPRAY 2 SPRAYS INTO EACH NOSTRIL EVERY DAY   gabapentin (NEURONTIN) 600 MG tablet   No No   Sig: TAKE 2 TABLETS BY MOUTH 3  TIMES DAILY   loratadine-pseudoephedrine (CLARITIN-D 24-HOUR)  mg per 24 hr tablet  Self Yes No   Sig: Take 1 tablet by mouth daily   losartan (COZAAR) 100 MG tablet  Self Yes No   metaxalone (SKELAXIN) 800 mg tablet  Self Yes No   Sig: Take 800 mg by mouth 3 (three) times a day   naproxen (NAPROSYN) 500 mg tablet  Self No No   Sig: Take 1 tablet (500 mg total) by mouth every 12 (twelve) hours as needed for mild pain or moderate pain   pantoprazole (PROTONIX) 40 mg tablet   No No   Sig: TAKE 1 TABLET BY MOUTH TWICE A DAY 30-60 MINUTES BEFORE BREAKFAST AND DINNER   rosuvastatin (CRESTOR) 5 mg tablet  Self Yes No   Sig: Take 1 tablet by mouth daily   solifenacin (VESICARE) 5 mg tablet  Self Yes No   Sig: Take 5 mg by mouth daily   sucralfate (CARAFATE) 1 g tablet   No No   Sig: Take 1 tablet (1 g total) by mouth 4 (four) times a day (before meals and at bedtime)   traMADol (ULTRAM-ER) 300 MG 24 hr tablet  Self Yes No   Sig: Take 300 mg by mouth daily      Facility-Administered Medications: None       Past Medical History:   Diagnosis Date    Hyperlipidemia     Hypertension        Past Surgical History:   Procedure Laterality Date    ANKLE LIGAMENT RECONSTRUCTION Right 2008    ARTHROSCOPY KNEE Left 05/06/2019    x2 prior tear 2018    KNEE SURGERY Right     ROTATOR CUFF REPAIR Left 2017    4 anchors    SHOULDER SURGERY      ULNAR NERVE TRANSPOSITION Bilateral 2010    WRIST FUSION Right 12/06/2019    WRIST SURGERY Right 2017/18       Family History   Problem Relation Age of Onset    Hypertension Mother     Hypertension Father     Kidney cancer Father      I have reviewed and agree with the history as documented  E-Cigarette/Vaping    E-Cigarette Use Never User      E-Cigarette/Vaping Substances     Social History     Tobacco Use    Smoking status: Former Smoker    Smokeless tobacco: Never Used   Vaping Use    Vaping Use: Never used   Substance Use Topics    Alcohol use: Yes     Alcohol/week: 2 0 standard drinks     Types: 2 Cans of beer per week     Comment: socially    Drug use: No       Review of Systems   Constitutional: Negative for activity change, appetite change, chills, diaphoresis, fatigue, fever and unexpected weight change  HENT: Negative for congestion, dental problem, drooling, ear discharge, ear pain, facial swelling, hearing loss, mouth sores, nosebleeds, postnasal drip, rhinorrhea, sinus pressure, sinus pain, sneezing, sore throat, tinnitus, trouble swallowing and voice change  Eyes: Negative for photophobia, pain, discharge, redness, itching and visual disturbance  Respiratory: Negative for cough, choking, chest tightness, shortness of breath, wheezing and stridor  Cardiovascular: Negative for chest pain, palpitations and leg swelling  Gastrointestinal: Positive for abdominal pain and nausea  Negative for abdominal distention, anal bleeding, blood in stool, constipation, diarrhea, rectal pain and vomiting  Endocrine: Negative for cold intolerance, heat intolerance, polydipsia, polyphagia and polyuria  Genitourinary: Positive for flank pain   Negative for decreased urine volume, difficulty urinating, dysuria, frequency, hematuria and urgency  Musculoskeletal: Negative for arthralgias, back pain, gait problem, joint swelling, myalgias, neck pain and neck stiffness  Skin: Positive for rash  Negative for color change, pallor and wound  Allergic/Immunologic: Negative for environmental allergies, food allergies and immunocompromised state  Neurological: Negative for dizziness, tremors, seizures, syncope, facial asymmetry, speech difficulty, weakness, light-headedness, numbness and headaches  Hematological: Negative for adenopathy  Does not bruise/bleed easily  Psychiatric/Behavioral: Negative for agitation, confusion and hallucinations  The patient is not nervous/anxious  All other systems reviewed and are negative  Physical Exam  Physical Exam  Vitals and nursing note reviewed  Constitutional:       General: He is not in acute distress  Appearance: Normal appearance  Comments: /93 (BP Location: Right arm)   Pulse 96   Temp 98 5 °F (36 9 °C) (Oral)   Resp 20   SpO2 98%      HENT:      Head: Normocephalic and atraumatic  Right Ear: External ear normal       Left Ear: External ear normal       Nose: Nose normal    Eyes:      General: No scleral icterus  Right eye: No discharge  Left eye: No discharge  Conjunctiva/sclera: Conjunctivae normal    Cardiovascular:      Rate and Rhythm: Normal rate  Pulses: Normal pulses  Pulmonary:      Effort: Pulmonary effort is normal  No respiratory distress  Abdominal:      General: There is no distension  Tenderness: There is no right CVA tenderness, left CVA tenderness, guarding or rebound  Musculoskeletal:         General: No tenderness, deformity or signs of injury  Normal range of motion  Cervical back: Normal range of motion and neck supple  No rigidity or tenderness  Skin:     Capillary Refill: Capillary refill takes less than 2 seconds  Coloration: Skin is not jaundiced or pale  Findings: Rash present  No bruising  Comments: There is a vesicular rash in a linear distribution to the right lower aspect of the abdomen wrapping around to the right flank  The rash does not past midline on the right side  No purpura or pustules  No petechiae  Rash appears consistent with shingles  Neurological:      General: No focal deficit present  Mental Status: He is alert and oriented to person, place, and time  Mental status is at baseline  Sensory: No sensory deficit  Motor: No weakness  Gait: Gait normal    Psychiatric:         Mood and Affect: Mood normal          Behavior: Behavior normal          Vital Signs  ED Triage Vitals [07/05/21 0635]   Temperature Pulse Respirations Blood Pressure SpO2   98 5 °F (36 9 °C) 96 20 148/93 98 %      Temp Source Heart Rate Source Patient Position - Orthostatic VS BP Location FiO2 (%)   Oral Monitor Sitting Right arm --      Pain Score       9           Vitals:    07/05/21 0635   BP: 148/93   Pulse: 96   Patient Position - Orthostatic VS: Sitting         Visual Acuity      ED Medications  Medications - No data to display    Diagnostic Studies  Results Reviewed     None                 No orders to display              Procedures  Procedures         ED Course                             SBIRT 20yo+      Most Recent Value   SBIRT (22 yo +)   In order to provide better care to our patients, we are screening all of our patients for alcohol and drug use  Would it be okay to ask you these screening questions? Yes Filed at: 07/05/2021 5748   Initial Alcohol Screen: US AUDIT-C    1  How often do you have a drink containing alcohol?  0 Filed at: 07/05/2021 0639   2  How many drinks containing alcohol do you have on a typical day you are drinking? 0 Filed at: 07/05/2021 0639   3a  Male UNDER 65: How often do you have five or more drinks on one occasion?   0 Filed at: 07/05/2021 0639   Audit-C Score  0 Filed at: 07/05/2021 4208   CLEMENTINE: How many times in the past year have you    Used an illegal drug or used a prescription medication for non-medical reasons? Never Filed at: 07/05/2021 0765                    MDM  Number of Diagnoses or Management Options  Shingles: new and does not require workup  Diagnosis management comments: Medical Decision Making:  ddx includes but is not limited to scabies, atopic dermatitis, strep infection, herpes zoster/shingles, fungal infection, allergic reaction, contact dermatitis, psoriasis, eczema, lice, flea bites, impetigo, pityriasis, seborrheic dermatitis, consider but doubt porphyria, vasculitis, chicken pox, measles  I discussed with patient his rash appears consistent with shingles  This is likely the source of his abdominal pain that started 3 days ago  Discussed that sometimes there may be a prodrome of pain prior to the outbreak of rash shingles  Patient's workup from 3 days ago demonstrated no acute intra-abdominal abnormalities  Given the interval development of a vesicular blistering rash in the same area as patient's prior abdominal pain suspect his prior pain was related to shingles  No indication for additional workup  Discussed treatment plan including use of analgesic pain medication  Standard narcotic precautions were given  Discussed use of acyclovir for treatment of shingles  Discussed follow-up with primary care physician for recheck in 3-5 days  Reviewed reasons to return to ed  Patient verbalized understanding of diagnosis and agreement with discharge plan of care as well as understanding of reasons to return to ed  I have reasonably determine that electronically prescribing a controlled substance would be impractical for the patient to obtain the controlled substance prescribed by electronic prescription or would cause an untimely delay resulting in an adverse impact on the patient's medical condition        Patient was seen during the outbreak of the corona virus epidemic   Resources are limited due to the severity of patient illnesses associated with virus   Testing is also limited at this time   Discussed with patient at the time of this evaluation   Due to the fact that limited resources are available -treatment options are limited  Amount and/or Complexity of Data Reviewed  Review and summarize past medical records: yes    Patient Progress  Patient progress: stable      Disposition  Final diagnoses:   Shingles     Time reflects when diagnosis was documented in both MDM as applicable and the Disposition within this note     Time User Action Codes Description Comment    7/5/2021  6:38 AM Vane Byrnes Add [B02 9] Shingles       ED Disposition     ED Disposition Condition Date/Time Comment    Discharge Stable Mon Jul 5, 2021  6:38 AM Arjun Shabazz discharge to home/self care  Follow-up Information     Follow up With Specialties Details Why Contact Info Additional Information    Nir Salguero MD Internal Medicine Call in 2 days for further evaluation of symptoms [address] Emergency Department Emergency Medicine Go to  If symptoms worsen [address] Emergency Department, [address] [place] [address], [id number]          Discharge Medication List as of 7/5/2021  6:49 AM      START taking these medications    Details   acyclovir (ZOVIRAX) 800 mg tablet Take 1 tablet (800 mg total) by mouth 5 (five) times a day for 7 days, Starting Mon 7/5/2021, Until Mon 7/12/2021, Normal      oxyCODONE-acetaminophen (PERCOCET) 5-325 mg per tablet Take 1 tablet by mouth every 6 (six) hours as needed for severe pain (dx shingles/initial rx) Label no driving no etoh  Initial rx    Dx:Max Daily Amount: 4 tablets, Starting Mon 7/5/2021, Normal         CONTINUE these medications which have NOT CHANGED Details   AJOVY 225 MG/1 5ML SOSY Inject 225 mg under the skin every 30 (thirty) days, Starting Mon 7/1/2019, Historical Med      amLODIPine (NORVASC) 2 5 mg tablet Take 2 5 mg by mouth daily, Historical Med      celecoxib (CeleBREX) 200 mg capsule Take 200 mg by mouth daily, Historical Med      Diclofenac Sodium 1 6 % GEL Place on the skin as needed, Historical Med      dicyclomine (BENTYL) 20 mg tablet TAKE 1 TABLET (20 MG TOTAL) BY MOUTH EVERY 6 (SIX) HOURS AS NEEDED (ABDOMINAL PAIN), Starting Fri 4/2/2021, Normal      fluticasone (FLONASE) 50 mcg/act nasal spray fluticasone propionate 50 mcg/actuation nasal spray,suspension   SPRAY 2 SPRAYS INTO EACH NOSTRIL EVERY DAY, Historical Med      gabapentin (NEURONTIN) 600 MG tablet TAKE 2 TABLETS BY MOUTH 3  TIMES DAILY, Normal      loratadine-pseudoephedrine (CLARITIN-D 24-HOUR)  mg per 24 hr tablet Take 1 tablet by mouth daily, Historical Med      losartan (COZAAR) 100 MG tablet Starting Thu 10/15/2020, Historical Med      metaxalone (SKELAXIN) 800 mg tablet Take 800 mg by mouth 3 (three) times a day, Historical Med      naproxen (NAPROSYN) 500 mg tablet Take 1 tablet (500 mg total) by mouth every 12 (twelve) hours as needed for mild pain or moderate pain, Starting Mon 9/7/2020, Print      pantoprazole (PROTONIX) 40 mg tablet TAKE 1 TABLET BY MOUTH TWICE A DAY 30-60 MINUTES BEFORE BREAKFAST AND DINNER, Normal      rosuvastatin (CRESTOR) 5 mg tablet Take 1 tablet by mouth daily, Starting Sun 6/30/2019, Historical Med      solifenacin (VESICARE) 5 mg tablet Take 5 mg by mouth daily, Historical Med      sucralfate (CARAFATE) 1 g tablet Take 1 tablet (1 g total) by mouth 4 (four) times a day (before meals and at bedtime), Starting Fri 7/2/2021, Print      traMADol (ULTRAM-ER) 300 MG 24 hr tablet Take 300 mg by mouth daily, Historical Med           No discharge procedures on file      PDMP Review     None          ED Provider  Electronically Signed by           Billy Villela Cristian Patel PA-C  07/10/21 1159

## 2021-07-08 ENCOUNTER — TELEPHONE (OUTPATIENT)
Dept: NEUROLOGY | Facility: CLINIC | Age: 56
End: 2021-07-08

## 2021-07-08 NOTE — TELEPHONE ENCOUNTER
Received request from [facility], requesting medical records   Please fax to [phone]    Faxed to [phone] on 7/8/2021

## 2021-07-12 ENCOUNTER — TELEPHONE (OUTPATIENT)
Dept: NEUROLOGY | Facility: CLINIC | Age: 56
End: 2021-07-12

## 2021-07-12 NOTE — TELEPHONE ENCOUNTER
Pt left VM stating that he was just recently diagnosed with Shingles  His PCP asked him to call us to see if Dr Jeanne Bass would like pt seen sooner than his 8/6/2021 appt  Please advise       [phone]

## 2021-07-13 ENCOUNTER — TELEPHONE (OUTPATIENT)
Dept: NEUROLOGY | Facility: CLINIC | Age: 56
End: 2021-07-13

## 2021-07-13 ENCOUNTER — TELEPHONE (OUTPATIENT)
Dept: GASTROENTEROLOGY | Facility: CLINIC | Age: 56
End: 2021-07-13

## 2021-07-13 NOTE — TELEPHONE ENCOUNTER
Shorty Payan is placing this overbook into the schedule for Thursday   I called the patient on both is home and cell phone and left full details with call back #  ~ Teresa Quintero

## 2021-07-13 NOTE — TELEPHONE ENCOUNTER
Doug Books patient - LMOM that his colonoscopy & EGD was April 14, 2021 and to [facility] call to [phone], option 1 with any other questions

## 2021-07-13 NOTE — TELEPHONE ENCOUNTER
Called and left a message on the Patient's home and cell # informing him that Dr Villagran Body made room in his schedule to see him for Shingles on 7/15 at 9:15  Ck in time 9:00 am     Left call back #

## 2021-07-14 NOTE — TELEPHONE ENCOUNTER
Berhane Victoria - patient called would like to reschedule his EUS  Please call Nat Banda, the patient, at [phone]  Thank you

## 2021-07-15 ENCOUNTER — OFFICE VISIT (OUTPATIENT)
Dept: NEUROLOGY | Facility: CLINIC | Age: 56
End: 2021-07-15
Payer: COMMERCIAL

## 2021-07-15 VITALS
WEIGHT: 195 LBS | HEART RATE: 82 BPM | BODY MASS INDEX: 26.41 KG/M2 | SYSTOLIC BLOOD PRESSURE: 124 MMHG | DIASTOLIC BLOOD PRESSURE: 84 MMHG | HEIGHT: 72 IN

## 2021-07-15 DIAGNOSIS — B02.29 POST HERPETIC NEURALGIA: Primary | ICD-10-CM

## 2021-07-15 DIAGNOSIS — R25.2 SPASM: ICD-10-CM

## 2021-07-15 PROCEDURE — 99214 OFFICE O/P EST MOD 30 MIN: CPT | Performed by: PSYCHIATRY & NEUROLOGY

## 2021-07-15 RX ORDER — LIDOCAINE 50 MG/G
PATCH TOPICAL
Qty: 90 PATCH | Refills: 3 | Status: SHIPPED | OUTPATIENT
Start: 2021-07-15

## 2021-07-15 RX ORDER — FLUTICASONE PROPIONATE 93 UG/1
93 SPRAY, METERED NASAL 2 TIMES DAILY
COMMUNITY
Start: 2021-04-13

## 2021-07-15 RX ORDER — METHOCARBAMOL 750 MG/1
750 TABLET, FILM COATED ORAL EVERY 6 HOURS PRN
Qty: 50 TABLET | Refills: 1 | Status: SHIPPED | OUTPATIENT
Start: 2021-07-15 | End: 2021-12-22 | Stop reason: ALTCHOICE

## 2021-07-15 NOTE — PROGRESS NOTES
Doug Perez is a 55 y o  male Who presents with pain in the right flank    Assessment:  1  Post herpetic neuralgia    2  Spasm        Plan:   continue gabapentin   Lidoderm patch  Optional as needed   Follow-up at scheduled appointment    Discussion:   Shanae has developed shingles in the distribution of the right T9-10 dermatome and is still having pain despite high dose of gabapentin and taking tramadol  As he is on tramadol would avoid Cymbalta and have recommended a trial of Lidoderm patch applying up to 3 patches in a 24 hour  For 12 hours on and 12 hours off in the affected area  He is having some muscle spasm in the same distribution in the thoracic region and have recommended a trial of Robaxin  He understands that this may make him sleepy and avoid activities where he has to be alert when he is taking the medication such as driving  I will see him back in follow-up at his scheduled appointment      Subjective:    HPI   Shanae returns in follow-up today with a new problem  He states that on July 2nd he was developing abdominal pain on the right side  He went to the emergency room and testing was done and no significant abnormalities were noted  He states that the pain became more severe and he developed a rash in the distribution of his abdomen and back of the T9-10 dermatome  At that point he would turn back to the emergency room 2 days later and was diagnosed with shingles  He was treated with acyclovir and given Percocet  He is already on high dose gabapentin for his peripheral neuropathy type pain and is also prescribed tramadol  He states he has run out of the narcotic medication and was seen by his primary physician who prescribed another course of acyclovir  He states the pain has improved to some degree going from a 10/10 to a 7/10 with hyperpathia and allodynia in the distribution    He also reports occasional spasm in the thoracic paraspinal region in the same distribution  Past Medical History:   Diagnosis Date    Hyperlipidemia     Hypertension        Family History:  Family History   Problem Relation Age of Onset    Hypertension Mother     Hypertension Father     Kidney cancer Father        Past Surgical History:  Past Surgical History:   Procedure Laterality Date    ANKLE LIGAMENT RECONSTRUCTION Right 2008    ARTHROSCOPY KNEE Left 05/06/2019    x2 prior tear 2018    KNEE SURGERY Right     ROTATOR CUFF REPAIR Left 2017    4 anchors    SHOULDER SURGERY Left 05/25/2021    ULNAR NERVE TRANSPOSITION Bilateral 2010    WRIST FUSION Right 12/06/2019    WRIST SURGERY Right 2017/18       Social History:   reports that he has never smoked  He has never used smokeless tobacco  He reports previous alcohol use of about 2 0 standard drinks of alcohol per week  He reports that he does not use drugs      Allergies:  Atorvastatin, Lisinopril, and Seasonal ic  [cholestatin]      Current Outpatient Medications:     acyclovir (ZOVIRAX) 800 mg tablet, Take 1 tablet (800 mg total) by mouth 5 (five) times a day for 7 days, Disp: 35 tablet, Rfl: 0    AJOVY 225 MG/1 5ML SOSY, Inject 225 mg under the skin every 30 (thirty) days, Disp: , Rfl:     celecoxib (CeleBREX) 200 mg capsule, Take 200 mg by mouth daily, Disp: , Rfl:     Diclofenac Sodium 1 6 % GEL, Place on the skin as needed, Disp: , Rfl:     dicyclomine (BENTYL) 20 mg tablet, TAKE 1 TABLET (20 MG TOTAL) BY MOUTH EVERY 6 (SIX) HOURS AS NEEDED (ABDOMINAL PAIN), Disp: 360 tablet, Rfl: 1    Fluticasone Propionate (Xhance) 93 MCG/ACT EXHU, 93 mcg into each nostril 2 (two) times a day, Disp: , Rfl:     gabapentin (NEURONTIN) 600 MG tablet, TAKE 2 TABLETS BY MOUTH 3  TIMES DAILY, Disp: 540 tablet, Rfl: 3    loratadine-pseudoephedrine (CLARITIN-D 24-HOUR)  mg per 24 hr tablet, Take 1 tablet by mouth daily, Disp: , Rfl:     losartan (COZAAR) 100 MG tablet, Take 100 mg by mouth daily , Disp: , Rfl:     metaxalone (SKELAXIN) 800 mg tablet, Take 800 mg by mouth 3 (three) times a day, Disp: , Rfl:     pantoprazole (PROTONIX) 40 mg tablet, TAKE 1 TABLET BY MOUTH TWICE A DAY 30-60 MINUTES BEFORE BREAKFAST AND DINNER, Disp: 60 tablet, Rfl: 1    rosuvastatin (CRESTOR) 5 mg tablet, Take 1 tablet by mouth daily, Disp: , Rfl:     solifenacin (VESICARE) 5 mg tablet, Take 5 mg by mouth daily, Disp: , Rfl:     traMADol (ULTRAM-ER) 300 MG 24 hr tablet, Take 300 mg by mouth daily, Disp: , Rfl:     amLODIPine (NORVASC) 2 5 mg tablet, Take 2 5 mg by mouth daily, Disp: , Rfl:     fluticasone (FLONASE) 50 mcg/act nasal spray, fluticasone propionate 50 mcg/actuation nasal spray,suspension  SPRAY 2 SPRAYS INTO EACH NOSTRIL EVERY DAY (Patient not taking: Reported on 7/15/2021), Disp: , Rfl:     lidocaine (LIDODERM) 5 %, Apply up to 3 patches q day for 12 hours and then remove for 12 to affected area, Disp: 90 patch, Rfl: 3    methocarbamol (ROBAXIN) 750 mg tablet, Take 1 tablet (750 mg total) by mouth every 6 (six) hours as needed for muscle spasms, Disp: 50 tablet, Rfl: 1    naproxen (NAPROSYN) 500 mg tablet, Take 1 tablet (500 mg total) by mouth every 12 (twelve) hours as needed for mild pain or moderate pain (Patient not taking: Reported on 7/15/2021), Disp: 20 tablet, Rfl: 0    oxyCODONE-acetaminophen (PERCOCET) 5-325 mg per tablet, Take 1 tablet by mouth every 6 (six) hours as needed for severe pain (dx shingles/initial rx) Label no driving no etoh  Initial rx    Dx:Max Daily Amount: 4 tablets, Disp: 20 tablet, Rfl: 0    sucralfate (CARAFATE) 1 g tablet, Take 1 tablet (1 g total) by mouth 4 (four) times a day (before meals and at bedtime) (Patient not taking: Reported on 7/15/2021), Disp: 20 tablet, Rfl: 0      I have reviewed the past medical, social and family history, current medications, allergies, vitals, review of systems and updated this information as appropriate today     Objective:    Vitals:  Blood pressure 124/84, pulse 82, height 6' (1 829 m), weight 88 5 kg (195 lb)  Physical Exam    Neurological Exam   GENERAL:  Well-developed well-nourished [] in no acute distress  HEENT/NECK: Head is atraumatic normocephalic, neck is supple  CARDIOVASCULAR: []  Carotid bruit  NEUROLOGIC:  Mental Status: Awake and alert without aphasia  Cranial Nerves: Extraocular movements are full  Face is symmetrical  Coordination:  He ambulates with an antalgic gait pattern  Skin:  A healing rash is noted in the abdomen and thoracic region in the distribution of T9-10            ROS:    Review of Systems   Constitutional: Positive for fatigue  Negative for appetite change and fever  HENT: Negative  Negative for hearing loss, tinnitus, trouble swallowing and voice change  Eyes: Negative  Negative for photophobia and pain  Respiratory: Negative  Negative for shortness of breath  Cardiovascular: Negative  Negative for palpitations  Gastrointestinal: Positive for abdominal pain  Negative for nausea and vomiting  Endocrine: Negative  Negative for cold intolerance  Genitourinary: Negative  Negative for dysuria, frequency and urgency  Musculoskeletal: Positive for back pain and gait problem  Negative for myalgias and neck pain  Skin: Positive for rash  Neurological: Positive for dizziness and headaches  Negative for tremors, seizures, syncope, facial asymmetry, speech difficulty, weakness, light-headedness and numbness  Hematological: Negative  Does not bruise/bleed easily  Psychiatric/Behavioral: Positive for decreased concentration and sleep disturbance  Negative for confusion and hallucinations

## 2021-07-17 DIAGNOSIS — K21.9 GASTROESOPHAGEAL REFLUX DISEASE, UNSPECIFIED WHETHER ESOPHAGITIS PRESENT: ICD-10-CM

## 2021-07-19 RX ORDER — PANTOPRAZOLE SODIUM 40 MG/1
TABLET, DELAYED RELEASE ORAL
Qty: 180 TABLET | Refills: 1 | Status: SHIPPED | OUTPATIENT
Start: 2021-07-19 | End: 2021-12-29

## 2021-07-22 ENCOUNTER — TELEPHONE (OUTPATIENT)
Dept: GASTROENTEROLOGY | Facility: CLINIC | Age: 56
End: 2021-07-22

## 2021-07-23 ENCOUNTER — TELEPHONE (OUTPATIENT)
Dept: NEUROLOGY | Facility: CLINIC | Age: 56
End: 2021-07-23

## 2021-09-27 DIAGNOSIS — R10.9 ABDOMINAL CRAMPING: ICD-10-CM

## 2021-09-27 RX ORDER — DICYCLOMINE HCL 20 MG
20 TABLET ORAL EVERY 6 HOURS PRN
Qty: 360 TABLET | Refills: 1 | Status: SHIPPED | OUTPATIENT
Start: 2021-09-27 | End: 2022-03-11

## 2021-10-27 ENCOUNTER — TELEPHONE (OUTPATIENT)
Dept: GASTROENTEROLOGY | Facility: CLINIC | Age: 56
End: 2021-10-27

## 2021-11-04 ENCOUNTER — APPOINTMENT (EMERGENCY)
Dept: CT IMAGING | Facility: HOSPITAL | Age: 56
End: 2021-11-04
Payer: COMMERCIAL

## 2021-11-04 ENCOUNTER — HOSPITAL ENCOUNTER (EMERGENCY)
Facility: HOSPITAL | Age: 56
Discharge: HOME | End: 2021-11-04
Attending: EMERGENCY MEDICINE
Payer: COMMERCIAL

## 2021-11-04 ENCOUNTER — APPOINTMENT (EMERGENCY)
Dept: RADIOLOGY | Facility: HOSPITAL | Age: 56
End: 2021-11-04
Payer: COMMERCIAL

## 2021-11-04 VITALS
SYSTOLIC BLOOD PRESSURE: 142 MMHG | BODY MASS INDEX: 22.66 KG/M2 | HEART RATE: 96 BPM | OXYGEN SATURATION: 100 % | HEIGHT: 73 IN | RESPIRATION RATE: 16 BRPM | TEMPERATURE: 97.5 F | DIASTOLIC BLOOD PRESSURE: 93 MMHG | WEIGHT: 171 LBS

## 2021-11-04 DIAGNOSIS — R10.9 ABDOMINAL PAIN: Primary | ICD-10-CM

## 2021-11-04 LAB
ALBUMIN SERPL BCP-MCNC: 3.9 G/DL (ref 3.5–5)
ALP SERPL-CCNC: 101 U/L (ref 46–116)
ALT SERPL W P-5'-P-CCNC: 31 U/L (ref 12–78)
ANION GAP SERPL CALCULATED.3IONS-SCNC: 9 MMOL/L (ref 4–13)
AST SERPL W P-5'-P-CCNC: 16 U/L (ref 5–45)
BASOPHILS # BLD AUTO: 0.02 THOUSANDS/ΜL (ref 0–0.1)
BASOPHILS NFR BLD AUTO: 0 % (ref 0–1)
BILIRUB SERPL-MCNC: 0.42 MG/DL (ref 0.2–1)
BILIRUB UR QL STRIP: NEGATIVE
BUN SERPL-MCNC: 7 MG/DL (ref 5–25)
CALCIUM SERPL-MCNC: 8.7 MG/DL (ref 8.3–10.1)
CHLORIDE SERPL-SCNC: 105 MMOL/L (ref 100–108)
CLARITY UR: CLEAR
CO2 SERPL-SCNC: 29 MMOL/L (ref 21–32)
COLOR UR: YELLOW
CREAT SERPL-MCNC: 0.92 MG/DL (ref 0.6–1.3)
EOSINOPHIL # BLD AUTO: 0.01 THOUSAND/ΜL (ref 0–0.61)
EOSINOPHIL NFR BLD AUTO: 0 % (ref 0–6)
ERYTHROCYTE [DISTWIDTH] IN BLOOD BY AUTOMATED COUNT: 12.2 % (ref 11.6–15.1)
GFR SERPL CREATININE-BSD FRML MDRD: 93 ML/MIN/1.73SQ M
GLUCOSE SERPL-MCNC: 105 MG/DL (ref 65–140)
GLUCOSE UR STRIP-MCNC: NEGATIVE MG/DL
HCT VFR BLD AUTO: 46.9 % (ref 36.5–49.3)
HGB BLD-MCNC: 15.7 G/DL (ref 12–17)
HGB UR QL STRIP.AUTO: NEGATIVE
IMM GRANULOCYTES # BLD AUTO: 0.01 THOUSAND/UL (ref 0–0.2)
IMM GRANULOCYTES NFR BLD AUTO: 0 % (ref 0–2)
KETONES UR STRIP-MCNC: NEGATIVE MG/DL
LEUKOCYTE ESTERASE UR QL STRIP: NEGATIVE
LIPASE SERPL-CCNC: 65 U/L (ref 73–393)
LYMPHOCYTES # BLD AUTO: 1.01 THOUSANDS/ΜL (ref 0.6–4.47)
LYMPHOCYTES NFR BLD AUTO: 15 % (ref 14–44)
MCH RBC QN AUTO: 31.6 PG (ref 26.8–34.3)
MCHC RBC AUTO-ENTMCNC: 33.5 G/DL (ref 31.4–37.4)
MCV RBC AUTO: 94 FL (ref 82–98)
MONOCYTES # BLD AUTO: 0.36 THOUSAND/ΜL (ref 0.17–1.22)
MONOCYTES NFR BLD AUTO: 5 % (ref 4–12)
NEUTROPHILS # BLD AUTO: 5.45 THOUSANDS/ΜL (ref 1.85–7.62)
NEUTS SEG NFR BLD AUTO: 80 % (ref 43–75)
NITRITE UR QL STRIP: NEGATIVE
NRBC BLD AUTO-RTO: 0 /100 WBCS
PH UR STRIP.AUTO: 6.5 [PH]
PLATELET # BLD AUTO: 258 THOUSANDS/UL (ref 149–390)
PMV BLD AUTO: 9.7 FL (ref 8.9–12.7)
POTASSIUM SERPL-SCNC: 4.4 MMOL/L (ref 3.5–5.3)
PROT SERPL-MCNC: 7.4 G/DL (ref 6.4–8.2)
PROT UR STRIP-MCNC: NEGATIVE MG/DL
RBC # BLD AUTO: 4.97 MILLION/UL (ref 3.88–5.62)
SODIUM SERPL-SCNC: 143 MMOL/L (ref 136–145)
SP GR UR STRIP.AUTO: 1.01 (ref 1–1.03)
TROPONIN I SERPL-MCNC: <0.02 NG/ML
UROBILINOGEN UR QL STRIP.AUTO: 0.2 E.U./DL
WBC # BLD AUTO: 6.86 THOUSAND/UL (ref 4.31–10.16)

## 2021-11-04 PROCEDURE — 99284 EMERGENCY DEPT VISIT MOD MDM: CPT | Performed by: EMERGENCY MEDICINE

## 2021-11-04 PROCEDURE — 96374 THER/PROPH/DIAG INJ IV PUSH: CPT

## 2021-11-04 PROCEDURE — 93005 ELECTROCARDIOGRAM TRACING: CPT

## 2021-11-04 PROCEDURE — 80053 COMPREHEN METABOLIC PANEL: CPT | Performed by: EMERGENCY MEDICINE

## 2021-11-04 PROCEDURE — 83690 ASSAY OF LIPASE: CPT | Performed by: EMERGENCY MEDICINE

## 2021-11-04 PROCEDURE — 99285 EMERGENCY DEPT VISIT HI MDM: CPT

## 2021-11-04 PROCEDURE — 84484 ASSAY OF TROPONIN QUANT: CPT | Performed by: EMERGENCY MEDICINE

## 2021-11-04 PROCEDURE — 74177 CT ABD & PELVIS W/CONTRAST: CPT

## 2021-11-04 PROCEDURE — 85025 COMPLETE CBC W/AUTO DIFF WBC: CPT | Performed by: EMERGENCY MEDICINE

## 2021-11-04 PROCEDURE — 36415 COLL VENOUS BLD VENIPUNCTURE: CPT | Performed by: EMERGENCY MEDICINE

## 2021-11-04 PROCEDURE — 71045 X-RAY EXAM CHEST 1 VIEW: CPT

## 2021-11-04 PROCEDURE — 81003 URINALYSIS AUTO W/O SCOPE: CPT | Performed by: EMERGENCY MEDICINE

## 2021-11-04 RX ORDER — IBUPROFEN 800 MG/1
800 TABLET ORAL 3 TIMES DAILY
Qty: 21 TABLET | Refills: 0 | Status: SHIPPED | OUTPATIENT
Start: 2021-11-04 | End: 2022-03-22

## 2021-11-04 RX ORDER — KETOROLAC TROMETHAMINE 30 MG/ML
15 INJECTION, SOLUTION INTRAMUSCULAR; INTRAVENOUS ONCE
Status: COMPLETED | OUTPATIENT
Start: 2021-11-04 | End: 2021-11-04

## 2021-11-04 RX ADMIN — IOHEXOL 100 ML: 350 INJECTION, SOLUTION INTRAVENOUS at 19:26

## 2021-11-04 RX ADMIN — KETOROLAC TROMETHAMINE 15 MG: 30 INJECTION, SOLUTION INTRAMUSCULAR at 18:53

## 2021-11-05 LAB
ATRIAL RATE: 103 BPM
P AXIS: 61 DEGREES
PR INTERVAL: 158 MS
QRS AXIS: 78 DEGREES
QRSD INTERVAL: 82 MS
QT INTERVAL: 342 MS
QTC INTERVAL: 448 MS
T WAVE AXIS: 59 DEGREES
VENTRICULAR RATE: 103 BPM

## 2021-11-05 PROCEDURE — 93010 ELECTROCARDIOGRAM REPORT: CPT | Performed by: INTERNAL MEDICINE

## 2021-12-21 ENCOUNTER — TELEPHONE (OUTPATIENT)
Dept: GASTROENTEROLOGY | Facility: HOSPITAL | Age: 56
End: 2021-12-21

## 2021-12-22 ENCOUNTER — ANESTHESIA EVENT (OUTPATIENT)
Dept: GASTROENTEROLOGY | Facility: HOSPITAL | Age: 56
End: 2021-12-22

## 2021-12-22 ENCOUNTER — ANESTHESIA (OUTPATIENT)
Dept: GASTROENTEROLOGY | Facility: HOSPITAL | Age: 56
End: 2021-12-22

## 2021-12-22 ENCOUNTER — HOSPITAL ENCOUNTER (OUTPATIENT)
Dept: GASTROENTEROLOGY | Facility: HOSPITAL | Age: 56
Setting detail: OUTPATIENT SURGERY
Discharge: HOME | End: 2021-12-22
Attending: INTERNAL MEDICINE | Admitting: INTERNAL MEDICINE
Payer: COMMERCIAL

## 2021-12-22 VITALS
OXYGEN SATURATION: 95 % | TEMPERATURE: 96.5 F | WEIGHT: 171 LBS | RESPIRATION RATE: 18 BRPM | SYSTOLIC BLOOD PRESSURE: 131 MMHG | BODY MASS INDEX: 22.66 KG/M2 | HEIGHT: 73 IN | DIASTOLIC BLOOD PRESSURE: 83 MMHG | HEART RATE: 83 BPM

## 2021-12-22 DIAGNOSIS — R93.89 ABNORMAL CT SCAN: ICD-10-CM

## 2021-12-22 PROBLEM — M25.519 SHOULDER JOINT PAIN: Status: ACTIVE | Noted: 2017-08-21

## 2021-12-22 PROBLEM — J45.909 ASTHMA: Status: ACTIVE | Noted: 2017-11-02

## 2021-12-22 PROBLEM — M48.02 CERVICAL STENOSIS OF SPINAL CANAL: Status: ACTIVE | Noted: 2017-06-29

## 2021-12-22 PROBLEM — G43.909 MIGRAINE: Status: ACTIVE | Noted: 2021-12-22

## 2021-12-22 PROBLEM — G56.20 ULNAR NERVE ABNORMALITY: Status: ACTIVE | Noted: 2019-08-29

## 2021-12-22 PROBLEM — R73.03 PREDIABETES: Status: ACTIVE | Noted: 2021-10-10

## 2021-12-22 PROBLEM — Z87.891 EX-MODERATE CIGARETTE SMOKER (10-19 PER DAY): Status: ACTIVE | Noted: 2018-09-26

## 2021-12-22 PROBLEM — K21.9 GASTROESOPHAGEAL REFLUX DISEASE: Status: ACTIVE | Noted: 2017-11-02

## 2021-12-22 PROCEDURE — 43259 EGD US EXAM DUODENUM/JEJUNUM: CPT | Performed by: INTERNAL MEDICINE

## 2021-12-22 RX ORDER — SODIUM CHLORIDE 9 MG/ML
INJECTION, SOLUTION INTRAVENOUS CONTINUOUS PRN
Status: DISCONTINUED | OUTPATIENT
Start: 2021-12-22 | End: 2021-12-22

## 2021-12-22 RX ORDER — PROPOFOL 10 MG/ML
INJECTION, EMULSION INTRAVENOUS AS NEEDED
Status: DISCONTINUED | OUTPATIENT
Start: 2021-12-22 | End: 2021-12-22

## 2021-12-22 RX ORDER — FENTANYL CITRATE 50 UG/ML
INJECTION, SOLUTION INTRAMUSCULAR; INTRAVENOUS AS NEEDED
Status: DISCONTINUED | OUTPATIENT
Start: 2021-12-22 | End: 2021-12-22

## 2021-12-22 RX ORDER — LIDOCAINE HYDROCHLORIDE 10 MG/ML
INJECTION, SOLUTION EPIDURAL; INFILTRATION; INTRACAUDAL; PERINEURAL AS NEEDED
Status: DISCONTINUED | OUTPATIENT
Start: 2021-12-22 | End: 2021-12-22

## 2021-12-22 RX ORDER — PROPOFOL 10 MG/ML
INJECTION, EMULSION INTRAVENOUS CONTINUOUS PRN
Status: DISCONTINUED | OUTPATIENT
Start: 2021-12-22 | End: 2021-12-22

## 2021-12-22 RX ADMIN — PROPOFOL 100 MG: 10 INJECTION, EMULSION INTRAVENOUS at 08:54

## 2021-12-22 RX ADMIN — PROPOFOL 100 MCG/KG/MIN: 10 INJECTION, EMULSION INTRAVENOUS at 08:54

## 2021-12-22 RX ADMIN — SODIUM CHLORIDE: 0.9 INJECTION, SOLUTION INTRAVENOUS at 08:48

## 2021-12-22 RX ADMIN — LIDOCAINE HYDROCHLORIDE 80 MG: 10 INJECTION, SOLUTION EPIDURAL; INFILTRATION; INTRACAUDAL; PERINEURAL at 08:50

## 2021-12-22 RX ADMIN — PROPOFOL 20 MG: 10 INJECTION, EMULSION INTRAVENOUS at 08:58

## 2021-12-22 RX ADMIN — FENTANYL CITRATE 50 MCG: 50 INJECTION INTRAMUSCULAR; INTRAVENOUS at 09:01

## 2021-12-22 RX ADMIN — PROPOFOL 10 MG: 10 INJECTION, EMULSION INTRAVENOUS at 09:03

## 2021-12-22 RX ADMIN — FENTANYL CITRATE 25 MCG: 50 INJECTION INTRAMUSCULAR; INTRAVENOUS at 08:54

## 2021-12-22 RX ADMIN — FENTANYL CITRATE 25 MCG: 50 INJECTION INTRAMUSCULAR; INTRAVENOUS at 08:58

## 2021-12-22 RX ADMIN — PROPOFOL 10 MG: 10 INJECTION, EMULSION INTRAVENOUS at 09:01

## 2021-12-29 ENCOUNTER — TELEPHONE (OUTPATIENT)
Dept: GASTROENTEROLOGY | Facility: CLINIC | Age: 56
End: 2021-12-29

## 2022-02-08 ENCOUNTER — OFFICE VISIT (OUTPATIENT)
Dept: NEUROLOGY | Facility: CLINIC | Age: 57
End: 2022-02-08
Payer: COMMERCIAL

## 2022-02-08 VITALS
WEIGHT: 171 LBS | SYSTOLIC BLOOD PRESSURE: 140 MMHG | HEIGHT: 73 IN | DIASTOLIC BLOOD PRESSURE: 80 MMHG | BODY MASS INDEX: 22.66 KG/M2

## 2022-02-08 DIAGNOSIS — G60.9 IDIOPATHIC PERIPHERAL NEUROPATHY: Primary | ICD-10-CM

## 2022-02-08 DIAGNOSIS — B02.29 POST HERPETIC NEURALGIA: ICD-10-CM

## 2022-02-08 PROCEDURE — 99213 OFFICE O/P EST LOW 20 MIN: CPT | Performed by: PSYCHIATRY & NEUROLOGY

## 2022-02-08 NOTE — PROGRESS NOTES
Desirae Logan is a 55 y o  male returns in follow-up today with history of peripheral neuropathy and shingles    Assessment:  1  Idiopathic peripheral neuropathy    2  Post herpetic neuralgia        Plan:  Continue gabapentin   Follow-up 6 months    Discussion:  Pema Coronel reports his post herpetic neuralgia issues have resolved  He did find Lidoderm helpful when he was symptomatic  He anticipates getting shingles vaccine in the near future  Ports overall his neuropathy has been fairly stable  He is aware some balance issues in the shower and uses a shower chair to minimize fall risk  Gabapentin is effective in controlling his symptoms of neuropathic pain  I will see him back in follow-up in 6 months      Subjective:    HPI  Pema Coronel returns in follow-up today  He reports that since here last his shingles pain symptoms have resolved  He did find the Lidoderm patch to be helpful  Anticipates getting the shingles vaccine in the near future  He reports mood standpoint his peripheral neuropathy is aware of some balance issues especially when he is washing his hair in the shower  He states he has gotten a shower chair and he uses this to minimize fall risk  Remains on gabapentin which seems to be effective in controlling his neuropathic pain symptoms  Is currently being treated for sinus infection and reports some pressure under the eyes as well as pain around his teeth  He notes a tingly sensation in the face which is most likely related to the sinus irritation        Past Medical History:   Diagnosis Date    Hyperlipidemia     Hypertension        Family History:  Family History   Problem Relation Age of Onset    Hypertension Mother     Hypertension Father     Kidney cancer Father        Past Surgical History:  Past Surgical History:   Procedure Laterality Date    ANKLE LIGAMENT RECONSTRUCTION Right 2008    ARTHROSCOPY KNEE Left 05/06/2019    x2 prior tear 2018    KNEE SURGERY Right     ROTATOR CUFF REPAIR Left 2017    4 anchors    SHOULDER SURGERY Left 05/25/2021    ULNAR NERVE TRANSPOSITION Bilateral 2010    WRIST FUSION Right 12/06/2019    WRIST SURGERY Right 2017/18       Social History:   reports that he has never smoked  He has never used smokeless tobacco  He reports previous alcohol use of about 2 0 standard drinks of alcohol per week  He reports that he does not use drugs      Allergies:  Atorvastatin, Lisinopril, and Seasonal ic  [cholestatin]      Current Outpatient Medications:     AJOVY 225 MG/1 5ML SOSY, Inject 225 mg under the skin every 30 (thirty) days, Disp: , Rfl:     celecoxib (CeleBREX) 200 mg capsule, Take 200 mg by mouth daily, Disp: , Rfl:     Diclofenac Sodium 1 6 % GEL, Place on the skin as needed, Disp: , Rfl:     dicyclomine (BENTYL) 20 mg tablet, TAKE 1 TABLET (20 MG TOTAL) BY MOUTH EVERY 6 (SIX) HOURS AS NEEDED (ABDOMINAL PAIN), Disp: 360 tablet, Rfl: 1    Fluticasone Propionate (Xhance) 93 MCG/ACT EXHU, 93 mcg into each nostril 2 (two) times a day, Disp: , Rfl:     gabapentin (NEURONTIN) 600 MG tablet, TAKE 2 TABLETS BY MOUTH 3  TIMES DAILY, Disp: 540 tablet, Rfl: 3    ibuprofen (MOTRIN) 800 mg tablet, Take 1 tablet (800 mg total) by mouth 3 (three) times a day, Disp: 21 tablet, Rfl: 0    lidocaine (LIDODERM) 5 %, Apply up to 3 patches q day for 12 hours and then remove for 12 to affected area, Disp: 90 patch, Rfl: 3    loratadine-pseudoephedrine (CLARITIN-D 24-HOUR)  mg per 24 hr tablet, Take 1 tablet by mouth daily, Disp: , Rfl:     losartan (COZAAR) 100 MG tablet, Take 100 mg by mouth daily , Disp: , Rfl:     metaxalone (SKELAXIN) 800 mg tablet, Take 800 mg by mouth 3 (three) times a day, Disp: , Rfl:     omeprazole (PriLOSEC) 40 MG capsule, Take 1 capsule (40 mg total) by mouth daily, Disp: 90 capsule, Rfl: 2    rosuvastatin (CRESTOR) 5 mg tablet, Take 1 tablet by mouth daily, Disp: , Rfl:     solifenacin (VESICARE) 5 mg tablet, Take 5 mg by mouth daily, Disp: , Rfl:     traMADol (ULTRAM-ER) 300 MG 24 hr tablet, Take 300 mg by mouth daily, Disp: , Rfl:     amLODIPine (NORVASC) 2 5 mg tablet, Take 2 5 mg by mouth daily, Disp: , Rfl:     fluticasone (FLONASE) 50 mcg/act nasal spray, fluticasone propionate 50 mcg/actuation nasal spray,suspension  SPRAY 2 SPRAYS INTO EACH NOSTRIL EVERY DAY (Patient not taking: Reported on 7/15/2021), Disp: , Rfl:     sucralfate (CARAFATE) 1 g tablet, Take 1 tablet (1 g total) by mouth 4 (four) times a day (before meals and at bedtime) (Patient not taking: Reported on 7/15/2021), Disp: 20 tablet, Rfl: 0    I have reviewed the past medical, social and family history, current medications, allergies, vitals, review of systems and updated this information as appropriate today     Objective:    Vitals:  Blood pressure 140/80, height 6' 1" (1 854 m), weight 77 6 kg (171 lb)  Physical Exam    Neurological Exam  GENERAL:  Well-developed well-nourished man in no acute distress  HEENT/NECK: Head is atraumatic normocephalic, neck is supple  NEUROLOGIC:  Mental Status: Awake and alert without aphasia  Cranial Nerves: Extraocular movements are full  Face is symmetrical  Motor:  No drift is noted on arm extension  Coordination:  Finger-to-nose testing is performed accurately  Romberg reveals increased sway with eyes closed  Gait is stable            ROS:    Review of Systems   Constitutional: Negative  Negative for appetite change and fever  HENT: Negative  Negative for hearing loss, tinnitus, trouble swallowing and voice change  Eyes: Negative  Negative for photophobia and pain  Respiratory: Negative  Negative for shortness of breath  Cardiovascular: Negative  Negative for palpitations  Gastrointestinal: Negative  Negative for nausea and vomiting  Endocrine: Negative  Negative for cold intolerance  Genitourinary: Negative  Negative for dysuria, frequency and urgency  Musculoskeletal: Negative    Negative for myalgias and neck pain  Skin: Negative  Negative for rash  Neurological: Negative  Negative for dizziness, tremors, seizures, syncope, facial asymmetry, speech difficulty, weakness, light-headedness, numbness and headaches  Hematological: Negative  Does not bruise/bleed easily  Psychiatric/Behavioral: Negative  Negative for confusion, hallucinations and sleep disturbance  All other systems reviewed and are negative

## 2022-03-07 ENCOUNTER — TELEPHONE (OUTPATIENT)
Dept: GASTROENTEROLOGY | Facility: CLINIC | Age: 57
End: 2022-03-07

## 2022-03-07 NOTE — TELEPHONE ENCOUNTER
----- Message from Durga Leon sent at 3/7/2022  1:50 PM EST -----  Regarding: Ila Beckwith office visit and test results  Reyes Porter,     I emailed you a recently about my yearly follow up visit  I am due for a visit in April but wanted to see if I can get an appointment towards the 3 or 4th week of this month  I have been having some stomach discomfort along with some back pain and had an ultrasound done in [facility] in [place] ( you should be able to see the results in my chart) that was ordered by Dr Gini Andrea ( my Primary) and the results were normal  If you could see if a date is available with either you or Dr Tr Montelongo ( who did my procedures in the hospital) for later this month that would be greatly appreciated      Thank you,  Ila Beckwith

## 2022-03-11 DIAGNOSIS — R10.9 ABDOMINAL CRAMPING: ICD-10-CM

## 2022-03-11 RX ORDER — DICYCLOMINE HCL 20 MG
20 TABLET ORAL EVERY 6 HOURS PRN
Qty: 360 TABLET | Refills: 1 | Status: SHIPPED | OUTPATIENT
Start: 2022-03-11 | End: 2022-03-22 | Stop reason: SDUPTHER

## 2022-03-18 RX ORDER — AMOXICILLIN AND CLAVULANATE POTASSIUM 875; 125 MG/1; MG/1
TABLET, FILM COATED ORAL
COMMUNITY
Start: 2022-01-31

## 2022-03-18 RX ORDER — MONTELUKAST SODIUM 10 MG/1
TABLET ORAL
COMMUNITY
Start: 2022-02-16

## 2022-03-18 RX ORDER — HYDROCHLOROTHIAZIDE 25 MG/1
25 TABLET ORAL DAILY
COMMUNITY
Start: 2022-01-25 | End: 2023-01-25

## 2022-03-18 RX ORDER — MOMETASONE FUROATE 50 UG/1
SPRAY, METERED NASAL
COMMUNITY
Start: 2022-03-10

## 2022-03-18 RX ORDER — MELOXICAM 15 MG/1
TABLET ORAL
COMMUNITY

## 2022-03-18 RX ORDER — DOXYCYCLINE 100 MG/1
TABLET ORAL
COMMUNITY
Start: 2022-02-10

## 2022-03-18 RX ORDER — LEVOCETIRIZINE DIHYDROCHLORIDE 5 MG/1
TABLET, FILM COATED ORAL
COMMUNITY
Start: 2022-03-10

## 2022-03-18 RX ORDER — METHYLPREDNISOLONE 4 MG/1
TABLET ORAL
COMMUNITY

## 2022-03-18 RX ORDER — AMLODIPINE BESYLATE 5 MG/1
TABLET ORAL
COMMUNITY
Start: 2022-01-01

## 2022-03-22 ENCOUNTER — OFFICE VISIT (OUTPATIENT)
Dept: GASTROENTEROLOGY | Facility: CLINIC | Age: 57
End: 2022-03-22
Payer: COMMERCIAL

## 2022-03-22 VITALS
WEIGHT: 210.2 LBS | DIASTOLIC BLOOD PRESSURE: 84 MMHG | SYSTOLIC BLOOD PRESSURE: 136 MMHG | BODY MASS INDEX: 27.86 KG/M2 | HEART RATE: 101 BPM | HEIGHT: 73 IN

## 2022-03-22 DIAGNOSIS — R10.9 ABDOMINAL CRAMPING: ICD-10-CM

## 2022-03-22 DIAGNOSIS — R10.30 LOWER ABDOMINAL PAIN: Primary | ICD-10-CM

## 2022-03-22 DIAGNOSIS — K21.9 GASTROESOPHAGEAL REFLUX DISEASE, UNSPECIFIED WHETHER ESOPHAGITIS PRESENT: ICD-10-CM

## 2022-03-22 PROCEDURE — 99214 OFFICE O/P EST MOD 30 MIN: CPT | Performed by: INTERNAL MEDICINE

## 2022-03-22 RX ORDER — FREMANEZUMAB-VFRM 225 MG/1.5ML
INJECTION SUBCUTANEOUS
COMMUNITY

## 2022-03-22 RX ORDER — FAMOTIDINE 20 MG/1
20 TABLET, FILM COATED ORAL 2 TIMES DAILY
Qty: 60 TABLET | Refills: 0 | Status: SHIPPED | OUTPATIENT
Start: 2022-03-22 | End: 2022-04-13

## 2022-03-22 RX ORDER — DICYCLOMINE HCL 20 MG
20 TABLET ORAL EVERY 6 HOURS PRN
Qty: 360 TABLET | Refills: 1 | Status: SHIPPED | OUTPATIENT
Start: 2022-03-22

## 2022-03-22 RX ORDER — OMEPRAZOLE 40 MG/1
40 CAPSULE, DELAYED RELEASE ORAL DAILY
Qty: 90 CAPSULE | Refills: 4 | Status: SHIPPED | OUTPATIENT
Start: 2022-03-22 | End: 2022-06-20

## 2022-03-22 NOTE — PROGRESS NOTES
Follow-up Note -  [facility]  Norma Garza 1965 55 y o  male     ASSESSMENT @ PLAN:   Is a 55-year-old male with known gastroesophageal reflux disease and lower abdominal pain related to spasm with an exacerbation of both problems  He had endoscopy colonoscopy with me on April 14, 2021 that showed mild nonerosive gastritis and a colonoscopy that showed left-sided diverticulosis  1 I told him to continue taking the omeprazole 40 mg p o  q a m  and will add Pepcid 20 mg p o  b i d  for 2-4 weeks    2 I told him to continue the probiotic and the fiber Choice    3 I told him to use the dicyclomine more liberally    4 if we need to we can treat him with nortriptyline  Reason:  GERD and abdominal pain    HPI:  He is a 55-year-old male with known gastroesophageal reflux disease with worsening of his symptoms over the last week with heartburn regurgitation and solid food dysphagia with  He had endoscopy with me on a open in 2021 that showed mild nonerosive gastritis and he has been stable on omeprazole 40 mg daily  He has no melena meds easy a no regurgitation no NSAID use  He talk antacid medicine on Saturday night and did help him  In addition he has had a vague bilateral lower quadrant abdominal pain which we have seen him for over the last year  He had a colonoscopy on April 14, 2021 that showed left-sided diverticulosis  He was given dicyclomine which has helped him  He has been doing a fiber and probiotic  He recently had a worsening of symptoms saw his primary care physician who sent him to urology  He ended up having a negative urology evaluation and ultrasound  He has had 2 CT scans  He has no diarrhea constipation melena hematochezia  He reports the pain comes and goes and varies in intensity  He reports that it feels like an ache across the lower abdomen  REVIEW OF SYSTEMS:     CONSTITUTIONAL: Denies any fever, chills, or rigors   Good appetite, and no recent weight loss  HEENT: No earache or tinnitus  Denies hearing loss or visual disturbances  CARDIOVASCULAR: No chest pain or palpitations  RESPIRATORY: Denies any cough, hemoptysis, shortness of breath or dyspnea on exertion  GASTROINTESTINAL: As noted in the History of Present Illness  GENITOURINARY: No problems with urination  Denies any hematuria or dysuria  NEUROLOGIC: No dizziness or vertigo, denies headaches  MUSCULOSKELETAL: Denies any muscle or joint pain  SKIN: Denies skin rashes or itching  ENDOCRINE: Denies excessive thirst  Denies intolerance to heat or cold  PSYCHOSOCIAL: Denies depression or anxiety  Denies any recent memory loss       Past Medical History:   Diagnosis Date    Hyperlipidemia     Hypertension       Past Surgical History:   Procedure Laterality Date    ANKLE LIGAMENT RECONSTRUCTION Right 2008    ARTHROSCOPY KNEE Left 05/06/2019    x2 prior tear 2018    KNEE SURGERY Right     ROTATOR CUFF REPAIR Left 2017    4 anchors    SHOULDER SURGERY Left 05/25/2021    ULNAR NERVE TRANSPOSITION Bilateral 2010    WRIST FUSION Right 12/06/2019    WRIST SURGERY Right 2017/18     Social History     Socioeconomic History    Marital status: /Civil Union     Spouse name: Not on file    Number of children: Not on file    Years of education: Not on file    Highest education level: Not on file   Occupational History    Not on file   Tobacco Use    Smoking status: Never Smoker    Smokeless tobacco: Never Used   Vaping Use    Vaping Use: Never used   Substance and Sexual Activity    Alcohol use: Not Currently     Alcohol/week: 2 0 standard drinks     Types: 2 Cans of beer per week    Drug use: Never    Sexual activity: Not on file   Other Topics Concern    Not on file   Social History Narrative    Not on file     Social Determinants of Health     Financial Resource Strain: Not on file   Food Insecurity: Not on file   Transportation Needs: Not on file   Physical Activity: Not on file   Stress: Not on file   Social Connections: Not on file   Intimate Partner Violence: Not on file   Housing Stability: Not on file     Family History   Problem Relation Age of Onset    Hypertension Mother     Hypertension Father     Kidney cancer Father      Atorvastatin, Lisinopril, and Seasonal ic  [cholestatin]  Current Outpatient Medications   Medication Sig Dispense Refill    AJOVY 225 MG/1 5ML SOSY Inject 225 mg under the skin every 30 (thirty) days      amLODIPine (NORVASC) 5 mg tablet       amoxicillin-clavulanate (AUGMENTIN) 875-125 mg per tablet TAKE 1 TABLET BY MOUTH EVERY 12 HOURS FOR 7 DAYS      celecoxib (CeleBREX) 200 mg capsule Take 200 mg by mouth daily      Diclofenac Sodium (VOLTAREN) 1 % diclofenac 1 % topical gel   APPLY 2 G 4 TIMES A DAY BY TOPICAL ROUTE AS NEEDED FOR 30 DAYS   Diclofenac Sodium 1 6 % GEL Place on the skin as needed      dicyclomine (BENTYL) 20 mg tablet Take 1 tablet (20 mg total) by mouth every 6 (six) hours as needed (abdominal pain) 360 tablet 1    doxycycline (ADOXA) 100 MG tablet TAKE 1 TABLET BY MOUTH TWICE A DAY FOR 7 DAYS      Fluticasone Propionate (Xhance) 93 MCG/ACT EXHU 93 mcg into each nostril 2 (two) times a day      fremanezumab-vfrm (Ajovy) 225 MG/1 5ML prefilled syringe Ajovy Syringe 225 mg/1 5 mL subcutaneous   INJECT 1 5 ML EVERY MONTH BY SUBCUTANEOUS ROUTE        gabapentin (NEURONTIN) 600 MG tablet TAKE 2 TABLETS BY MOUTH 3  TIMES DAILY 540 tablet 3    hydrochlorothiazide (HYDRODIURIL) 25 mg tablet Take 25 mg by mouth daily      levocetirizine (XYZAL) 5 MG tablet       lidocaine (LIDODERM) 5 % Apply up to 3 patches q day for 12 hours and then remove for 12 to affected area 90 patch 3    loratadine-pseudoephedrine (CLARITIN-D 24-HOUR)  mg per 24 hr tablet Take 1 tablet by mouth daily      losartan (COZAAR) 100 MG tablet Take 100 mg by mouth daily       meloxicam (MOBIC) 15 mg tablet meloxicam 15 mg tablet      metaxalone (SKELAXIN) 800 mg tablet Take 800 mg by mouth 3 (three) times a day      methylprednisolone (MEDROL) 4 mg tablet methylprednisolone 4 mg tablets in a dose pack   TAKE 6 TABLETS ON DAY 1 AS DIRECTED ON PACKAGE AND DECREASE BY 1 TAB EACH DAY FOR A TOTAL OF 6 DAYS      mometasone (NASONEX) 50 mcg/act nasal spray       montelukast (SINGULAIR) 10 mg tablet       omeprazole (PriLOSEC) 40 MG capsule Take 1 capsule (40 mg total) by mouth daily 90 capsule 4    rosuvastatin (CRESTOR) 5 mg tablet Take 1 tablet by mouth daily      solifenacin (VESICARE) 5 mg tablet Take 5 mg by mouth daily      traMADol (ULTRAM-ER) 300 MG 24 hr tablet Take 300 mg by mouth daily      famotidine (PEPCID) 20 mg tablet Take 1 tablet (20 mg total) by mouth 2 (two) times a day 60 tablet 0     No current facility-administered medications for this visit  Blood pressure 136/84, pulse 101, height 6' 1" (1 854 m), weight 95 3 kg (210 lb 3 2 oz)  PHYSICAL EXAM:     General Appearance:   Alert, cooperative, no distress, appears stated age    HEENT:   Normocephalic, atraumatic, anicteric      Neck:  Supple, symmetrical, trachea midline, no adenopathy;    thyroid: no enlargement/tenderness/nodules; no carotid  bruit or JVD    Lungs:   Clear to auscultation bilaterally; no rales, rhonchi or wheezing; respirations unlabored    Heart[de-identified]   S1 and S2 normal; regular rate and rhythm; no murmur, rub, or gallop     Abdomen:   Soft, non-tender, non-distended; normal bowel sounds; no masses, no organomegaly    Genitalia:   Deferred    Rectal:   Deferred    Extremities:  No cyanosis, clubbing or edema    Pulses:  2+ and symmetric all extremities    Skin:  Skin color, texture, turgor normal, no rashes or lesions    Lymph nodes:  No palpable cervical, axillary or inguinal lymphadenopathy        Lab Results   Component Value Date    WBC 6 86 11/04/2021    HGB 15 7 11/04/2021    HCT 46 9 11/04/2021    MCV 94 11/04/2021     11/04/2021     Lab Results   Component Value Date    CALCIUM 8 7 11/04/2021    K 4 4 11/04/2021    CO2 29 11/04/2021     11/04/2021    BUN 7 11/04/2021    CREATININE 0 92 11/04/2021     Lab Results   Component Value Date    ALT 31 11/04/2021    AST 16 11/04/2021    ALKPHOS 101 11/04/2021     Lab Results   Component Value Date    INR 0 95 07/02/2021    PROTIME 12 2 07/02/2021             Answers for HPI/ROS submitted by the patient on 3/15/2022  Chronicity: chronic  Onset: more than 1 year ago  Onset quality: gradual  Frequency: daily  Episode duration: 4 hours  Progression since onset: waxing and waning  Pain location: LLQ, RLQ  Pain - numeric: 6/10  Pain quality: aching, dull  Radiates to: left flank, right flank, pelvis  anorexia: No  arthralgias: Yes  belching: No  constipation: No  diarrhea: No  dysuria: No  fever: No  flatus: No  frequency: Yes  headaches: Yes  hematochezia: No  hematuria: No  melena: No  myalgias: Yes  nausea:  No  weight loss: No  vomiting: No  Aggravated by: eating  Relieved by: bowel movements  Diagnostic workup: CT scan

## 2022-04-11 DIAGNOSIS — G60.9 IDIOPATHIC PERIPHERAL NEUROPATHY: ICD-10-CM

## 2022-04-12 RX ORDER — GABAPENTIN 600 MG/1
TABLET ORAL
Qty: 540 TABLET | Refills: 3 | Status: SHIPPED | OUTPATIENT
Start: 2022-04-12

## 2022-04-13 DIAGNOSIS — R10.30 LOWER ABDOMINAL PAIN: ICD-10-CM

## 2022-04-13 DIAGNOSIS — K21.9 GASTROESOPHAGEAL REFLUX DISEASE, UNSPECIFIED WHETHER ESOPHAGITIS PRESENT: ICD-10-CM

## 2022-04-13 RX ORDER — FAMOTIDINE 20 MG/1
TABLET, FILM COATED ORAL
Qty: 180 TABLET | Refills: 1 | Status: SHIPPED | OUTPATIENT
Start: 2022-04-13

## 2022-06-13 ENCOUNTER — HOSPITAL ENCOUNTER (EMERGENCY)
Facility: HOSPITAL | Age: 57
Discharge: HOME | End: 2022-06-14
Attending: EMERGENCY MEDICINE
Payer: COMMERCIAL

## 2022-06-13 ENCOUNTER — APPOINTMENT (EMERGENCY)
Dept: CT IMAGING | Facility: HOSPITAL | Age: 57
End: 2022-06-13
Payer: COMMERCIAL

## 2022-06-13 DIAGNOSIS — R10.9 ABDOMINAL PAIN: ICD-10-CM

## 2022-06-13 DIAGNOSIS — R51.9 NONINTRACTABLE HEADACHE, UNSPECIFIED CHRONICITY PATTERN, UNSPECIFIED HEADACHE TYPE: ICD-10-CM

## 2022-06-13 DIAGNOSIS — R42 LIGHTHEADEDNESS: ICD-10-CM

## 2022-06-13 DIAGNOSIS — R55 NEAR SYNCOPE: Primary | ICD-10-CM

## 2022-06-13 LAB
ALBUMIN SERPL BCP-MCNC: 3.6 G/DL (ref 3.5–5)
ALP SERPL-CCNC: 107 U/L (ref 46–116)
ALT SERPL W P-5'-P-CCNC: 34 U/L (ref 12–78)
ANION GAP SERPL CALCULATED.3IONS-SCNC: 7 MMOL/L (ref 4–13)
AST SERPL W P-5'-P-CCNC: 16 U/L (ref 5–45)
BASOPHILS # BLD AUTO: 0.01 THOUSANDS/ΜL (ref 0–0.1)
BASOPHILS NFR BLD AUTO: 0 % (ref 0–1)
BILIRUB SERPL-MCNC: 0.27 MG/DL (ref 0.2–1)
BUN SERPL-MCNC: 13 MG/DL (ref 5–25)
CALCIUM SERPL-MCNC: 8.4 MG/DL (ref 8.3–10.1)
CARDIAC TROPONIN I PNL SERPL HS: 2 NG/L
CHLORIDE SERPL-SCNC: 105 MMOL/L (ref 100–108)
CO2 SERPL-SCNC: 29 MMOL/L (ref 21–32)
CREAT SERPL-MCNC: 1.03 MG/DL (ref 0.6–1.3)
EOSINOPHIL # BLD AUTO: 0.03 THOUSAND/ΜL (ref 0–0.61)
EOSINOPHIL NFR BLD AUTO: 1 % (ref 0–6)
ERYTHROCYTE [DISTWIDTH] IN BLOOD BY AUTOMATED COUNT: 12.3 % (ref 11.6–15.1)
FLUAV RNA RESP QL NAA+PROBE: NEGATIVE
FLUBV RNA RESP QL NAA+PROBE: NEGATIVE
GFR SERPL CREATININE-BSD FRML MDRD: 80 ML/MIN/1.73SQ M
GLUCOSE SERPL-MCNC: 98 MG/DL (ref 65–140)
HCT VFR BLD AUTO: 43.3 % (ref 36.5–49.3)
HGB BLD-MCNC: 14.9 G/DL (ref 12–17)
IMM GRANULOCYTES # BLD AUTO: 0.01 THOUSAND/UL (ref 0–0.2)
IMM GRANULOCYTES NFR BLD AUTO: 0 % (ref 0–2)
LIPASE SERPL-CCNC: 71 U/L (ref 73–393)
LYMPHOCYTES # BLD AUTO: 1.12 THOUSANDS/ΜL (ref 0.6–4.47)
LYMPHOCYTES NFR BLD AUTO: 19 % (ref 14–44)
MAGNESIUM SERPL-MCNC: 2.2 MG/DL (ref 1.6–2.6)
MCH RBC QN AUTO: 32 PG (ref 26.8–34.3)
MCHC RBC AUTO-ENTMCNC: 34.4 G/DL (ref 31.4–37.4)
MCV RBC AUTO: 93 FL (ref 82–98)
MONOCYTES # BLD AUTO: 0.43 THOUSAND/ΜL (ref 0.17–1.22)
MONOCYTES NFR BLD AUTO: 8 % (ref 4–12)
NEUTROPHILS # BLD AUTO: 4.16 THOUSANDS/ΜL (ref 1.85–7.62)
NEUTS SEG NFR BLD AUTO: 72 % (ref 43–75)
NRBC BLD AUTO-RTO: 0 /100 WBCS
PLATELET # BLD AUTO: 268 THOUSANDS/UL (ref 149–390)
PMV BLD AUTO: 9.9 FL (ref 8.9–12.7)
POTASSIUM SERPL-SCNC: 4.3 MMOL/L (ref 3.5–5.3)
PROT SERPL-MCNC: 6.8 G/DL (ref 6.4–8.2)
RBC # BLD AUTO: 4.66 MILLION/UL (ref 3.88–5.62)
RSV RNA RESP QL NAA+PROBE: NEGATIVE
SARS-COV-2 RNA RESP QL NAA+PROBE: NEGATIVE
SODIUM SERPL-SCNC: 141 MMOL/L (ref 136–145)
WBC # BLD AUTO: 5.76 THOUSAND/UL (ref 4.31–10.16)

## 2022-06-13 PROCEDURE — 83690 ASSAY OF LIPASE: CPT | Performed by: EMERGENCY MEDICINE

## 2022-06-13 PROCEDURE — 83735 ASSAY OF MAGNESIUM: CPT | Performed by: EMERGENCY MEDICINE

## 2022-06-13 PROCEDURE — 85025 COMPLETE CBC W/AUTO DIFF WBC: CPT | Performed by: EMERGENCY MEDICINE

## 2022-06-13 PROCEDURE — 74176 CT ABD & PELVIS W/O CONTRAST: CPT

## 2022-06-13 PROCEDURE — 80053 COMPREHEN METABOLIC PANEL: CPT | Performed by: EMERGENCY MEDICINE

## 2022-06-13 PROCEDURE — 93005 ELECTROCARDIOGRAM TRACING: CPT

## 2022-06-13 PROCEDURE — 0241U HB NFCT DS VIR RESP RNA 4 TRGT: CPT | Performed by: EMERGENCY MEDICINE

## 2022-06-13 PROCEDURE — 99284 EMERGENCY DEPT VISIT MOD MDM: CPT | Performed by: EMERGENCY MEDICINE

## 2022-06-13 PROCEDURE — 84484 ASSAY OF TROPONIN QUANT: CPT | Performed by: EMERGENCY MEDICINE

## 2022-06-13 PROCEDURE — 96361 HYDRATE IV INFUSION ADD-ON: CPT

## 2022-06-13 PROCEDURE — 70450 CT HEAD/BRAIN W/O DYE: CPT

## 2022-06-13 PROCEDURE — 36415 COLL VENOUS BLD VENIPUNCTURE: CPT | Performed by: EMERGENCY MEDICINE

## 2022-06-13 PROCEDURE — 96365 THER/PROPH/DIAG IV INF INIT: CPT

## 2022-06-13 PROCEDURE — 99284 EMERGENCY DEPT VISIT MOD MDM: CPT

## 2022-06-13 RX ORDER — MAGNESIUM SULFATE 1 G/100ML
1 INJECTION INTRAVENOUS ONCE
Status: COMPLETED | OUTPATIENT
Start: 2022-06-13 | End: 2022-06-13

## 2022-06-13 RX ORDER — METOCLOPRAMIDE HYDROCHLORIDE 5 MG/ML
10 INJECTION INTRAMUSCULAR; INTRAVENOUS ONCE
Status: COMPLETED | OUTPATIENT
Start: 2022-06-13 | End: 2022-06-14

## 2022-06-13 RX ORDER — DIPHENHYDRAMINE HYDROCHLORIDE 50 MG/ML
25 INJECTION INTRAMUSCULAR; INTRAVENOUS ONCE
Status: COMPLETED | OUTPATIENT
Start: 2022-06-13 | End: 2022-06-14

## 2022-06-13 RX ADMIN — SODIUM CHLORIDE 1000 ML: 0.9 INJECTION, SOLUTION INTRAVENOUS at 22:33

## 2022-06-13 RX ADMIN — MAGNESIUM SULFATE HEPTAHYDRATE 1 G: 1 INJECTION, SOLUTION INTRAVENOUS at 22:43

## 2022-06-14 VITALS
HEART RATE: 98 BPM | DIASTOLIC BLOOD PRESSURE: 88 MMHG | TEMPERATURE: 98.7 F | RESPIRATION RATE: 12 BRPM | HEIGHT: 72 IN | WEIGHT: 195 LBS | BODY MASS INDEX: 26.41 KG/M2 | OXYGEN SATURATION: 95 % | SYSTOLIC BLOOD PRESSURE: 129 MMHG

## 2022-06-14 LAB
2HR DELTA HS TROPONIN: 0 NG/L
ATRIAL RATE: 117 BPM
CARDIAC TROPONIN I PNL SERPL HS: 2 NG/L
P AXIS: 56 DEGREES
PR INTERVAL: 156 MS
QRS AXIS: 86 DEGREES
QRSD INTERVAL: 82 MS
QT INTERVAL: 322 MS
QTC INTERVAL: 449 MS
T WAVE AXIS: 33 DEGREES
VENTRICULAR RATE: 117 BPM

## 2022-06-14 PROCEDURE — 96375 TX/PRO/DX INJ NEW DRUG ADDON: CPT

## 2022-06-14 PROCEDURE — 36415 COLL VENOUS BLD VENIPUNCTURE: CPT | Performed by: EMERGENCY MEDICINE

## 2022-06-14 PROCEDURE — 84484 ASSAY OF TROPONIN QUANT: CPT | Performed by: EMERGENCY MEDICINE

## 2022-06-14 PROCEDURE — 93010 ELECTROCARDIOGRAM REPORT: CPT | Performed by: INTERNAL MEDICINE

## 2022-06-14 RX ORDER — KETOROLAC TROMETHAMINE 30 MG/ML
15 INJECTION, SOLUTION INTRAMUSCULAR; INTRAVENOUS ONCE
Status: COMPLETED | OUTPATIENT
Start: 2022-06-14 | End: 2022-06-14

## 2022-06-14 RX ORDER — LIDOCAINE HYDROCHLORIDE 20 MG/ML
15 SOLUTION OROPHARYNGEAL ONCE
Status: COMPLETED | OUTPATIENT
Start: 2022-06-14 | End: 2022-06-14

## 2022-06-14 RX ORDER — MAGNESIUM HYDROXIDE/ALUMINUM HYDROXICE/SIMETHICONE 120; 1200; 1200 MG/30ML; MG/30ML; MG/30ML
30 SUSPENSION ORAL ONCE
Status: COMPLETED | OUTPATIENT
Start: 2022-06-14 | End: 2022-06-14

## 2022-06-14 RX ADMIN — METOCLOPRAMIDE HYDROCHLORIDE 10 MG: 5 INJECTION INTRAMUSCULAR; INTRAVENOUS at 00:03

## 2022-06-14 RX ADMIN — DIPHENHYDRAMINE HYDROCHLORIDE 25 MG: 50 INJECTION, SOLUTION INTRAMUSCULAR; INTRAVENOUS at 00:03

## 2022-06-14 RX ADMIN — ALUMINA, MAGNESIA, AND SIMETHICONE ORAL SUSPENSION REGULAR STRENGTH 30 ML: 1200; 1200; 120 SUSPENSION ORAL at 00:48

## 2022-06-14 RX ADMIN — LIDOCAINE HYDROCHLORIDE 15 ML: 20 SOLUTION ORAL; TOPICAL at 00:48

## 2022-06-14 RX ADMIN — KETOROLAC TROMETHAMINE 15 MG: 30 INJECTION, SOLUTION INTRAMUSCULAR at 00:48

## 2022-06-14 NOTE — ED PROVIDER NOTES
Pt Name: Camille Andino  MRN: [id number]  [place] 1965  Age/Sex: 65 y o  male  Date of evaluation: 6/13/2022  PCP: Vitor Hickey MD    [address] [place] [address]    Chief Complaint   Patient presents with    Dizziness     Pt states he broke out in a cold sweat this evening and became lightheaded, c/o HA now  Given pneumonia vaccine today  HPI and MDM    65 y o  male presenting with dizziness tonight patient while watching TV became lightheaded and felt like he was going to pass out  He states that he broke got into a cold sweat  Has had some chest soreness, also mentions abdominal pain, points towards epigastrium and lower abdominal region  This was around 8:00 p m  Karolina Davis Got the pneumonia vaccine earlier today around 3:30 p m   States now he also has a headache  Has history of migraines but this feels different  No photophobia or phonophobia, no numbness or tingling or weakness, no visual changes  No fevers or chills, no sick contacts, no cough or shortness of breath  EKG shows sinus tachycardia heart rate 117, narrow QRS, normal axis, intervals reassuring, no ST elevation, no significant ST depression, no evidence of Brugada syndrome  Patient presenting with presyncope as described  Blood work in ER is reassuring  Tachycardia improved with IV fluids  Headache improved with medications  CT head and CT abdomen/pelvis are also reassuring  Patient updated with results  Patient care signed out to Dr Praveena Gant pending delta troponin                Medications   sodium chloride 0 9 % bolus 1,000 mL (0 mL Intravenous Stopped 6/14/22 0056)   metoclopramide (REGLAN) injection 10 mg (10 mg Intravenous Given 6/14/22 0003)   diphenhydrAMINE (BENADRYL) injection 25 mg (25 mg Intravenous Given 6/14/22 0003)   magnesium sulfate IVPB (premix) SOLN 1 g (0 g Intravenous Stopped 6/13/22 2343)   ketorolac (TORADOL) injection 15 mg (15 mg Intravenous Given 6/14/22 0048)   aluminum-magnesium hydroxide-simethicone (MYLANTA) oral suspension 30 mL (30 mL Oral Given 6/14/22 0048)   Lidocaine Viscous HCl (XYLOCAINE) 2 % mucosal solution 15 mL (15 mL Swish & Swallow Given 6/14/22 0048)         Past Medical and Surgical History    Past Medical History:   Diagnosis Date    Hyperlipidemia     Hypertension        Past Surgical History:   Procedure Laterality Date    ANKLE LIGAMENT RECONSTRUCTION Right 2008    ARTHROSCOPY KNEE Left 05/06/2019    x2 prior tear 2018    KNEE SURGERY Right     ROTATOR CUFF REPAIR Left 2017    4 anchors    SHOULDER SURGERY Left 05/25/2021    ULNAR NERVE TRANSPOSITION Bilateral 2010    WRIST FUSION Right 12/06/2019    WRIST SURGERY Right 2017/18       Family History   Problem Relation Age of Onset    Hypertension Mother     Hypertension Father     Kidney cancer Father        Social History     Tobacco Use    Smoking status: Never Smoker    Smokeless tobacco: Never Used   Vaping Use    Vaping Use: Never used   Substance Use Topics    Alcohol use: Not Currently     Alcohol/week: 2 0 standard drinks     Types: 2 Cans of beer per week    Drug use: Never           Allergies    Allergies   Allergen Reactions    Atorvastatin      disorientation    Lisinopril Cough    Seasonal Ic  [Cholestatin]      [facility] - [id number]: *HAYFEVER*       Home Medications    Prior to Admission medications    Medication Sig Start Date End Date Taking? Authorizing Provider   AJOVY 225 MG/1 5ML SOSY Inject 225 mg under the skin every 30 (thirty) days 7/1/19   Historical Provider, MD   amLODIPine (NORVASC) 5 mg tablet  1/1/22   Historical Provider, MD   amoxicillin-clavulanate (AUGMENTIN) 875-125 mg per tablet TAKE 1 TABLET BY MOUTH EVERY 12 HOURS FOR 7 DAYS 1/31/22   Historical Provider, MD   celecoxib (CeleBREX) 200 mg capsule Take 200 mg by mouth daily    Historical Provider, MD   Diclofenac Sodium (VOLTAREN) 1 % diclofenac 1 % topical gel   APPLY 2 G 4 TIMES A DAY BY TOPICAL ROUTE AS NEEDED FOR 30 DAYS  Historical Provider, MD   Diclofenac Sodium 1 6 % GEL Place on the skin as needed    Historical Provider, MD   dicyclomine (BENTYL) 20 mg tablet Take 1 tablet (20 mg total) by mouth every 6 (six) hours as needed (abdominal pain) 3/22/22   Emre Pitts III, MD   doxycycline (ADOXA) 100 MG tablet TAKE 1 TABLET BY MOUTH TWICE A DAY FOR 7 DAYS 2/10/22   Historical Provider, MD   famotidine (PEPCID) 20 mg tablet TAKE 1 TABLET BY MOUTH TWICE A DAY 4/13/22   Sophia Kline MD   Fluticasone Propionate Orpha Perch) 93 MCG/ACT EXHU 93 mcg into each nostril 2 (two) times a day 4/13/21   Historical Provider, MD   fremanezumab-vfrm (Ajovy) 225 MG/1 5ML prefilled syringe Ajovy Syringe 225 mg/1 5 mL subcutaneous   INJECT 1 5 ML EVERY MONTH BY SUBCUTANEOUS ROUTE      Historical Provider, MD   gabapentin (NEURONTIN) 600 MG tablet TAKE 2 TABLETS BY MOUTH 3  TIMES DAILY 4/12/22   Martine Primrose, MD   hydrochlorothiazide (HYDRODIURIL) 25 mg tablet Take 25 mg by mouth daily 1/25/22 1/25/23  Historical Provider, MD   levocetirizine (XYZAL) 5 MG tablet  3/10/22   Historical Provider, MD   lidocaine (LIDODERM) 5 % Apply up to 3 patches q day for 12 hours and then remove for 12 to affected area 7/15/21   Martine Primrose, MD   loratadine-pseudoephedrine (CLARITIN-D 24-HOUR)  mg per 24 hr tablet Take 1 tablet by mouth daily    Historical Provider, MD   losartan (COZAAR) 100 MG tablet Take 100 mg by mouth daily  10/15/20   Historical Provider, MD   meloxicam (MOBIC) 15 mg tablet meloxicam 15 mg tablet    Historical Provider, MD   metaxalone (SKELAXIN) 800 mg tablet Take 800 mg by mouth 3 (three) times a day    Historical Provider, MD   methylprednisolone (MEDROL) 4 mg tablet methylprednisolone 4 mg tablets in a dose pack   TAKE 6 TABLETS ON DAY 1 AS DIRECTED ON PACKAGE AND DECREASE BY 1 TAB EACH DAY FOR A TOTAL OF 6 DAYS    Historical Provider, MD   mometasone (NASONEX) 50 mcg/act nasal spray  3/10/22   Historical Provider, MD montelukast (SINGULAIR) 10 mg tablet  2/16/22   Historical Provider, MD   omeprazole (PriLOSEC) 40 MG capsule Take 1 capsule (40 mg total) by mouth daily 3/22/22 6/20/22  Glenis Smith MD   rosuvastatin (CRESTOR) 5 mg tablet Take 1 tablet by mouth daily 6/30/19   Historical Provider, MD   solifenacin (VESICARE) 5 mg tablet Take 5 mg by mouth daily    Historical Provider, MD   traMADol (ULTRAM-ER) 300 MG 24 hr tablet Take 300 mg by mouth daily    Historical Provider, MD           Review of Systems    Review of Systems   Constitutional: Positive for diaphoresis  Negative for chills and fever  HENT: Negative for rhinorrhea and sore throat  Eyes: Negative for pain and visual disturbance  Respiratory: Negative for cough and shortness of breath  Cardiovascular: Positive for chest pain  Negative for leg swelling  Gastrointestinal: Positive for nausea  Negative for abdominal pain and vomiting  Genitourinary: Negative for dysuria and hematuria  Musculoskeletal: Negative for back pain and myalgias  Skin: Negative for rash and wound  Neurological: Positive for light-headedness and headaches  Negative for syncope  Physical Exam      ED Triage Vitals   Temperature Pulse Respirations Blood Pressure SpO2   06/13/22 2108 06/13/22 2108 06/13/22 2108 06/13/22 2108 06/13/22 2108   98 7 °F (37 1 °C) (!) 119 18 161/89 97 %      Temp Source Heart Rate Source Patient Position - Orthostatic VS BP Location FiO2 (%)   06/13/22 2108 06/13/22 2108 06/13/22 2108 06/13/22 2108 --   Oral Monitor Sitting Left arm       Pain Score       06/14/22 0033       6               Physical Exam  Constitutional:       General: He is not in acute distress  Appearance: He is not ill-appearing  HENT:      Head: Normocephalic and atraumatic  Nose: Nose normal       Mouth/Throat:      Mouth: Mucous membranes are moist    Eyes:      Extraocular Movements: Extraocular movements intact        Pupils: Pupils are equal, round, and reactive to light  Cardiovascular:      Rate and Rhythm: Normal rate and regular rhythm  Pulmonary:      Effort: No respiratory distress  Breath sounds: Normal breath sounds  No wheezing  Chest:      Chest wall: No tenderness  Abdominal:      General: There is no distension  Palpations: Abdomen is soft  Comments: Wound lower abdominal tenderness to palpation, epigastric tenderness to palpation   Musculoskeletal:         General: No swelling or deformity  Normal range of motion  Cervical back: Normal range of motion and neck supple  Skin:     General: Skin is warm  Findings: No erythema  Neurological:      Mental Status: He is alert and oriented to person, place, and time  Mental status is at baseline  Sensory: No sensory deficit  Motor: No weakness  Diagnostic Results      Labs:    Results Reviewed     Procedure Component Value Units Date/Time    HS Troponin I 2hr [[id number]]  (Normal) Collected: 06/14/22 0042    Lab Status: Final result Specimen: Blood from Arm, Left Updated: 06/14/22 0110     hs TnI 2hr 2 ng/L      Delta 2hr hsTnI 0 ng/L     HS Troponin 0hr (reflex protocol) [[id number]]  (Normal) Collected: 06/13/22 2225    Lab Status: Final result Specimen: Blood from Arm, Left Updated: 06/13/22 2316     hs TnI 0hr 2 ng/L     COVID/FLU/RSV - 2 hour TAT [[id number]]  (Normal) Collected: 06/13/22 2225    Lab Status: Final result Specimen: Nares from Nose Updated: 06/13/22 2313     SARS-CoV-2 Negative     INFLUENZA A PCR Negative     INFLUENZA B PCR Negative     RSV PCR Negative    Narrative:      FOR PEDIATRIC PATIENTS - copy/paste COVID Guidelines URL to browser: [email or web address]  [email or web address]    SARS-CoV-2 assay is a Nucleic Acid Amplification assay intended for the  qualitative detection of nucleic acid from SARS-CoV-2 in nasopharyngeal  swabs   Results are for the presumptive identification of SARS-CoV-2 RNA  Positive results are indicative of infection with SARS-CoV-2, the virus  causing COVID-19, but do not rule out bacterial infection or co-infection  with other viruses  Laboratories within the [place] and its  territories are required to report all positive results to the appropriate  public health authorities  Negative results do not preclude SARS-CoV-2  infection and should not be used as the sole basis for treatment or other  patient management decisions  Negative results must be combined with  clinical observations, patient history, and epidemiological information  This test has not been FDA cleared or approved  This test has been authorized by FDA under an Emergency Use Authorization  (EUA)  This test is only authorized for the duration of time the  declaration that circumstances exist justifying the authorization of the  emergency use of an in vitro diagnostic tests for detection of SARS-CoV-2  virus and/or diagnosis of COVID-19 infection under section 564(b)(1) of  the Act, 21 U  S C  856NRJ-6(O)(2), unless the authorization is terminated  or revoked sooner  The test has been validated but independent review by FDA  and CLIA is pending  Test performed using [employer] GeneXpert: This RT-PCR assay targets N2,  a region unique to SARS-CoV-2  A conserved region in the E-gene was chosen  for pan-Sarbecovirus detection which includes SARS-CoV-2      Comprehensive metabolic panel [[id number]] Collected: 06/13/22 2225    Lab Status: Final result Specimen: Blood from Arm, Left Updated: 06/13/22 2247     Sodium 141 mmol/L      Potassium 4 3 mmol/L      Chloride 105 mmol/L      CO2 29 mmol/L      ANION GAP 7 mmol/L      BUN 13 mg/dL      Creatinine 1 03 mg/dL      Glucose 98 mg/dL      Calcium 8 4 mg/dL      AST 16 U/L      ALT 34 U/L      Alkaline Phosphatase 107 U/L      Total Protein 6 8 g/dL      Albumin 3 6 g/dL      Total Bilirubin 0 27 mg/dL      eGFR 80 ml/min/1 73sq m     Narrative: National Kidney Disease Foundation guidelines for Chronic Kidney Disease (CKD):     Stage 1 with normal or high GFR (GFR > 90 mL/min/1 73 square meters)    Stage 2 Mild CKD (GFR = 60-89 mL/min/1 73 square meters)    Stage 3A Moderate CKD (GFR = 45-59 mL/min/1 73 square meters)    Stage 3B Moderate CKD (GFR = 30-44 mL/min/1 73 square meters)    Stage 4 Severe CKD (GFR = 15-29 mL/min/1 73 square meters)    Stage 5 End Stage CKD (GFR <15 mL/min/1 73 square meters)  Note: GFR calculation is accurate only with a steady state creatinine    Magnesium [[id number]]  (Normal) Collected: 06/13/22 2225    Lab Status: Final result Specimen: Blood from Arm, Left Updated: 06/13/22 2247     Magnesium 2 2 mg/dL     Lipase [[id number]]  (Abnormal) Collected: 06/13/22 2225    Lab Status: Final result Specimen: Blood from Arm, Left Updated: 06/13/22 2247     Lipase 71 u/L     CBC and differential [[id number]] Collected: 06/13/22 2225    Lab Status: Final result Specimen: Blood from Arm, Left Updated: 06/13/22 2231     WBC 5 76 Thousand/uL      RBC 4 66 Million/uL      Hemoglobin 14 9 g/dL      Hematocrit 43 3 %      MCV 93 fL      MCH 32 0 pg      MCHC 34 4 g/dL      RDW 12 3 %      MPV 9 9 fL      Platelets 426 Thousands/uL      nRBC 0 /100 WBCs      Neutrophils Relative 72 %      Immat GRANS % 0 %      Lymphocytes Relative 19 %      Monocytes Relative 8 %      Eosinophils Relative 1 %      Basophils Relative 0 %      Neutrophils Absolute 4 16 Thousands/µL      Immature Grans Absolute 0 01 Thousand/uL      Lymphocytes Absolute 1 12 Thousands/µL      Monocytes Absolute 0 43 Thousand/µL      Eosinophils Absolute 0 03 Thousand/µL      Basophils Absolute 0 01 Thousands/µL           All labs reviewed and utilized in the medical decision making process    Radiology:    CT head without contrast   Final Result      No acute intracranial abnormality                    Workstation performed: [id number]         CT abdomen pelvis wo contrast Final Result      No focal acute inflammatory findings in the abdomen or pelvis within limitations of noncontrast exam       Stable 2 mm nonobstructing left lower pole intrarenal calculus  No significant interval changes compared to prior study dated 2021  Workstation performed: [id number]             All radiology studies independently viewed by me and interpreted by the radiologist     Procedure    Procedures        FINAL IMPRESSION    Final diagnoses:   Near syncope   Lightheadedness   Abdominal pain   Nonintractable headache, unspecified chronicity pattern, unspecified headache type         DISPOSITION    Time reflects when diagnosis was documented in both MDM as applicable and the Disposition within this note     Time User Action Codes Description Comment    2022 12:44 AM Genna Kohler Add [R55] Near syncope     2022 12:44 AM Genna Kohler Add [R42] Lightheadedness     2022 12:44 AM Genna Kohler Add [R10 9] Abdominal pain     2022 12:44 AM Genna Kohler Add [R51 9] Nonintractable headache, unspecified chronicity pattern, unspecified headache type       ED Disposition     ED Disposition   Discharge    Condition   Stable    Date/Time    12:56 AM    Comment   [facility] discharge to home/self care                 Follow-up Information     Follow up With Specialties Details Why Ronnie Otero MD Internal Medicine Schedule an appointment as soon as possible for a visit in 2 days  [address] [facility] [place] [id number] [phone]              PATIENT REFERRED TO:    Katheryne Apgar, MD  [address] [place] [address]  [phone]    Schedule an appointment as soon as possible for a visit in 2 days        DISCHARGE MEDICATIONS:    Discharge Medication List as of 2022 12:57 AM      CONTINUE these medications which have NOT CHANGED    Details   !! AJOVY 225 MG/1 5ML SOSY Inject 225 mg under the skin every 30 (thirty) days, Starting Mon 7/1/2019, Historical Med      amLODIPine (NORVASC) 5 mg tablet Starting Sat 1/1/2022, Historical Med      amoxicillin-clavulanate (AUGMENTIN) 875-125 mg per tablet TAKE 1 TABLET BY MOUTH EVERY 12 HOURS FOR 7 DAYS, Historical Med      celecoxib (CeleBREX) 200 mg capsule Take 200 mg by mouth daily, Historical Med      Diclofenac Sodium (VOLTAREN) 1 % diclofenac 1 % topical gel   APPLY 2 G 4 TIMES A DAY BY TOPICAL ROUTE AS NEEDED FOR 30 DAYS , Historical Med      Diclofenac Sodium 1 6 % GEL Place on the skin as needed, Historical Med      dicyclomine (BENTYL) 20 mg tablet Take 1 tablet (20 mg total) by mouth every 6 (six) hours as needed (abdominal pain), Starting Tue 3/22/2022, Normal      doxycycline (ADOXA) 100 MG tablet TAKE 1 TABLET BY MOUTH TWICE A DAY FOR 7 DAYS, Historical Med      famotidine (PEPCID) 20 mg tablet TAKE 1 TABLET BY MOUTH TWICE A DAY, Normal      Fluticasone Propionate (Xhance) 93 MCG/ACT EXHU 93 mcg into each nostril 2 (two) times a day, Starting Tue 4/13/2021, Historical Med      !! fremanezumab-vfrm (Ajovy) 225 MG/1 5ML prefilled syringe Ajovy Syringe 225 mg/1 5 mL subcutaneous   INJECT 1 5 ML EVERY MONTH BY SUBCUTANEOUS ROUTE , Historical Med      gabapentin (NEURONTIN) 600 MG tablet TAKE 2 TABLETS BY MOUTH 3  TIMES DAILY, Normal      hydrochlorothiazide (HYDRODIURIL) 25 mg tablet Take 25 mg by mouth daily, Starting Tue 1/25/2022, Until Wed 1/25/2023, Historical Med      levocetirizine (XYZAL) 5 MG tablet Starting Thu 3/10/2022, Historical Med      lidocaine (LIDODERM) 5 % Apply up to 3 patches q day for 12 hours and then remove for 12 to affected area, Normal      loratadine-pseudoephedrine (CLARITIN-D 24-HOUR)  mg per 24 hr tablet Take 1 tablet by mouth daily, Historical Med      losartan (COZAAR) 100 MG tablet Take 100 mg by mouth daily , Starting Thu 10/15/2020, Historical Med      meloxicam (MOBIC) 15 mg tablet meloxicam 15 mg tablet, Historical Med      metaxalone (SKELAXIN) 800 mg tablet Take 800 mg by mouth 3 (three) times a day, Historical Med      methylprednisolone (MEDROL) 4 mg tablet methylprednisolone 4 mg tablets in a dose pack   TAKE 6 TABLETS ON DAY 1 AS DIRECTED ON PACKAGE AND DECREASE BY 1 TAB EACH DAY FOR A TOTAL OF 6 DAYS, Historical Med      mometasone (NASONEX) 50 mcg/act nasal spray Starting Thu 3/10/2022, Historical Med      montelukast (SINGULAIR) 10 mg tablet Starting Wed 2/16/2022, Historical Med      omeprazole (PriLOSEC) 40 MG capsule Take 1 capsule (40 mg total) by mouth daily, Starting Tue 3/22/2022, Until Mon 6/20/2022, Normal      rosuvastatin (CRESTOR) 5 mg tablet Take 1 tablet by mouth daily, Starting Sun 6/30/2019, Historical Med      solifenacin (VESICARE) 5 mg tablet Take 5 mg by mouth daily, Historical Med      traMADol (ULTRAM-ER) 300 MG 24 hr tablet Take 300 mg by mouth daily, Historical Med       !! - Potential duplicate medications found  Please discuss with provider  No discharge procedures on file  Omer Jain DO        This note was partially completed using voice recognition technology, and was scanned for gross errors; however some errors may still exist  Please contact the author with any questions or requests for clarification        Omer Jain DO  06/14/22 2704

## 2022-06-14 NOTE — ED CARE HANDOFF
Emergency Department Sign Out Note        Sign out and transfer of care from Dr Venancio Escobar  See Separate Emergency Department note  The patient, Sam Arreaga, was evaluated by the previous provider for chest pain and lightheadedness  Workup Completed:  hs TnI 2hr "Refer to ACS Flowchart"- see link ng/L 2          ED Course / Workup Pending (followup): Patient's delta troponin negative  Patient reassessed  Patient states he feels better and is asymptomatic on reassessment  Discussed diagnostic uncertainty but the possible etiology the patient  Discussed follow-up with primary care to discuss continued evaluation and treatment  Discussed and emphasized return precautions  Procedures  MDM        Disposition  Final diagnoses:   Near syncope   Lightheadedness   Abdominal pain   Nonintractable headache, unspecified chronicity pattern, unspecified headache type     Time reflects when diagnosis was documented in both MDM as applicable and the Disposition within this note     Time User Action Codes Description Comment    6/14/2022 12:44 AM Dorla Gains Add [R55] Near syncope     6/14/2022 12:44 AM Dorla Gains Add [R42] Lightheadedness     6/14/2022 12:44 AM Dorla Gains Add [R10 9] Abdominal pain     6/14/2022 12:44 AM Dorla Gains Add [R51 9] Nonintractable headache, unspecified chronicity pattern, unspecified headache type       ED Disposition     ED Disposition   Discharge    Condition   Stable    Date/Time   Tue Jun 14, 2022 12:56 AM    Comment   [facility] discharge to home/self care                 Follow-up Information     Follow up With Specialties Details Why Raymond Cardoza MD Internal Medicine Schedule an appointment as soon as possible for a visit in 2 days  [address] [facility] [address]  [phone]          Patient's Medications   Discharge Prescriptions    No medications on file     No discharge procedures on file        ED Provider  Electronically Signed by     Buck Stoner MD  06/14/22 1019

## 2022-08-30 ENCOUNTER — OFFICE VISIT (OUTPATIENT)
Dept: NEUROLOGY | Facility: CLINIC | Age: 57
End: 2022-08-30
Payer: COMMERCIAL

## 2022-08-30 VITALS
WEIGHT: 210 LBS | SYSTOLIC BLOOD PRESSURE: 126 MMHG | HEART RATE: 92 BPM | DIASTOLIC BLOOD PRESSURE: 86 MMHG | BODY MASS INDEX: 28.44 KG/M2 | HEIGHT: 72 IN

## 2022-08-30 DIAGNOSIS — G60.9 IDIOPATHIC PERIPHERAL NEUROPATHY: Primary | ICD-10-CM

## 2022-08-30 DIAGNOSIS — R42 DIZZINESS AND GIDDINESS: ICD-10-CM

## 2022-08-30 DIAGNOSIS — R51.9 HEADACHE: ICD-10-CM

## 2022-08-30 PROCEDURE — 99215 OFFICE O/P EST HI 40 MIN: CPT | Performed by: PSYCHIATRY & NEUROLOGY

## 2022-08-30 RX ORDER — FLUTICASONE PROPIONATE 50 MCG
SPRAY, SUSPENSION (ML) NASAL
COMMUNITY
Start: 2022-07-22

## 2022-08-30 RX ORDER — METOPROLOL SUCCINATE 25 MG/1
25 TABLET, EXTENDED RELEASE ORAL DAILY
COMMUNITY
Start: 2022-07-12 | End: 2023-07-12

## 2022-08-30 NOTE — PROGRESS NOTES
Telma Golden is a 65 y o  male neuropathy with recent symptoms of dizziness and headache    Assessment:  1  Idiopathic peripheral neuropathy    2  Dizziness and giddiness    3  Headache        Plan:  Continue gabapentin   Follow-up 6 months    Discussion:  Reuben Llanos develop symptoms of dizziness described as more lightheadedness shortly after a pneumonia vaccine  He was seen in the emergency room and workup was unremarkable  He reports ringing in his right ear with a feeling of fullness in both ears  He was recently seen by ENT and was found to have decreased hearing in the right ear which may account for the tinnitus and is scheduled for a VNG in the near future  He is being followed by a concussion specialist for headaches and is on Ajovy  He is currently going to physical therapy as well  His neuropathy symptoms remain under good control and I will see him back in follow-up in 6 months    Subjective:    HPI  Reuben Llanos returns in follow-up today  He reports no new symptoms from standpoint of his neuropathy which remain under good control with gabapentin  He states that in June shortly after having a pneumonia vaccine he developed lightheadedness  He went to the emergency room and had a CT scan blood work and cardiac evaluation performed  He was noted to be tachycardic which seem to improve with fluids the other workup was unremarkable  He states since that time he has been having symptoms of lightheadedness  He states this often occurs when he has a full sensation in his ears and also reports some ringing in the right ear when this occurs  She was seen by 1 ENT who felt that he might need tubes in his ears however a 2nd opinion demonstrated some hearing loss in the right ear which may account for the tinnitus  He has anticipating a VNG in the near future  He denies any spinning sensation associated with these symptoms  He also reports headaches    He states he is being followed by a concussion specialist at [facility] and was placed on [address] [place] [address] Avenue  Initially this seemed to help but states recently has been getting more headaches  He states he gets headaches about 5 days a week and migraines about twice a week  He is working with the specialist and states therapy is being initiated  Past Medical History:   Diagnosis Date    Hyperlipidemia     Hypertension        Family History:  Family History   Problem Relation Age of Onset    Hypertension Mother     Hypertension Father     Kidney cancer Father        Past Surgical History:  Past Surgical History:   Procedure Laterality Date    ANKLE LIGAMENT RECONSTRUCTION Right 2008    ARTHROSCOPY KNEE Left 05/06/2019    x2 prior tear 2018    KNEE SURGERY Right     ROTATOR CUFF REPAIR Left 2017    4 anchors    SHOULDER SURGERY Left 05/25/2021    ULNAR NERVE TRANSPOSITION Bilateral 2010    WRIST FUSION Right 12/06/2019    WRIST SURGERY Right 2017/18       Social History:   reports that he has never smoked  He has never used smokeless tobacco  He reports previous alcohol use of about 2 0 standard drinks of alcohol per week  He reports that he does not use drugs      Allergies:  Atorvastatin, Lisinopril, and Seasonal ic  [cholestatin]      Current Outpatient Medications:     amLODIPine (NORVASC) 5 mg tablet, , Disp: , Rfl:     celecoxib (CeleBREX) 200 mg capsule, Take 200 mg by mouth daily, Disp: , Rfl:     Diclofenac Sodium (VOLTAREN) 1 %, diclofenac 1 % topical gel  APPLY 2 G 4 TIMES A DAY BY TOPICAL ROUTE AS NEEDED FOR 30 DAYS , Disp: , Rfl:     dicyclomine (BENTYL) 20 mg tablet, Take 1 tablet (20 mg total) by mouth every 6 (six) hours as needed (abdominal pain), Disp: 360 tablet, Rfl: 1    famotidine (PEPCID) 20 mg tablet, TAKE 1 TABLET BY MOUTH TWICE A DAY, Disp: 180 tablet, Rfl: 1    fremanezumab-vfrm (Ajovy) 225 MG/1 5ML prefilled syringe, Ajovy Syringe 225 mg/1 5 mL subcutaneous  INJECT 1 5 ML EVERY MONTH BY SUBCUTANEOUS ROUTE , Disp: , Rfl:   gabapentin (NEURONTIN) 600 MG tablet, TAKE 2 TABLETS BY MOUTH 3  TIMES DAILY, Disp: 540 tablet, Rfl: 3    hydrochlorothiazide (HYDRODIURIL) 25 mg tablet, Take 25 mg by mouth daily, Disp: , Rfl:     levocetirizine (XYZAL) 5 MG tablet, , Disp: , Rfl:     lidocaine (LIDODERM) 5 %, Apply up to 3 patches q day for 12 hours and then remove for 12 to affected area, Disp: 90 patch, Rfl: 3    loratadine-pseudoephedrine (CLARITIN-D 24-HOUR)  mg per 24 hr tablet, Take 1 tablet by mouth daily, Disp: , Rfl:     losartan (COZAAR) 100 MG tablet, Take 100 mg by mouth daily , Disp: , Rfl:     meloxicam (MOBIC) 15 mg tablet, meloxicam 15 mg tablet, Disp: , Rfl:     metaxalone (SKELAXIN) 800 mg tablet, Take 800 mg by mouth 3 (three) times a day, Disp: , Rfl:     mometasone (NASONEX) 50 mcg/act nasal spray, , Disp: , Rfl:     montelukast (SINGULAIR) 10 mg tablet, , Disp: , Rfl:     omeprazole (PriLOSEC) 40 MG capsule, Take 1 capsule (40 mg total) by mouth daily, Disp: 90 capsule, Rfl: 4    rosuvastatin (CRESTOR) 5 mg tablet, Take 1 tablet by mouth daily, Disp: , Rfl:     solifenacin (VESICARE) 5 mg tablet, Take 5 mg by mouth daily, Disp: , Rfl:     traMADol (ULTRAM-ER) 300 MG 24 hr tablet, Take 300 mg by mouth daily, Disp: , Rfl:     AJOVY 225 MG/1 5ML SOSY, Inject 225 mg under the skin every 30 (thirty) days (Patient not taking: Reported on 8/30/2022), Disp: , Rfl:     amoxicillin-clavulanate (AUGMENTIN) 875-125 mg per tablet, TAKE 1 TABLET BY MOUTH EVERY 12 HOURS FOR 7 DAYS (Patient not taking: Reported on 8/30/2022), Disp: , Rfl:     Diclofenac Sodium 1 6 % GEL, Place on the skin as needed (Patient not taking: Reported on 8/30/2022), Disp: , Rfl:     doxycycline (ADOXA) 100 MG tablet, TAKE 1 TABLET BY MOUTH TWICE A DAY FOR 7 DAYS (Patient not taking: Reported on 8/30/2022), Disp: , Rfl:     fluticasone (FLONASE) 50 mcg/act nasal spray, SPRAY 2 SPRAYS INTO EACH NOSTRIL EVERY DAY (Patient not taking: Reported on 8/30/2022), Disp: , Rfl:     methylprednisolone (MEDROL) 4 mg tablet, methylprednisolone 4 mg tablets in a dose pack  TAKE 6 TABLETS ON DAY 1 AS DIRECTED ON PACKAGE AND DECREASE BY 1 TAB EACH DAY FOR A TOTAL OF 6 DAYS (Patient not taking: Reported on 8/30/2022), Disp: , Rfl:     metoprolol succinate (TOPROL-XL) 25 mg 24 hr tablet, Take 25 mg by mouth daily (Patient not taking: Reported on 8/30/2022), Disp: , Rfl:     I have reviewed the past medical, social and family history, current medications, allergies, vitals, review of systems and updated this information as appropriate today     Objective:    Vitals:  Blood pressure 126/86, pulse 92, height 6' (1 829 m), weight 95 3 kg (210 lb)  Physical Exam    Neurological Exam  GENERAL:  Well-developed well-nourished man in no acute distress  HEENT/NECK: Head is atraumatic normocephalic, neck is supple  NEUROLOGIC:  Mental Status: Awake and alert without aphasia  Cranial Nerves: Extraocular movements are full without nystagmus  Face is symmetrical  Motor:  No drift is noted on arm extension  Coordination:  finger-to-nose testing is performed accurately  Romberg reveals mild sway with eyes closed  Gait is stable with an increased base utilizing a straight cane with an antalgic component  Reflexes: 1/4 in the biceps triceps and brachioradialis regions            ROS:    Review of Systems   Constitutional: Negative  Negative for appetite change and fever  HENT: Negative  Negative for hearing loss, tinnitus, trouble swallowing and voice change  Eyes: Negative  Negative for photophobia and pain  Respiratory: Negative  Negative for shortness of breath  Cardiovascular: Negative  Negative for palpitations  Gastrointestinal: Negative  Negative for nausea and vomiting  Endocrine: Negative  Negative for cold intolerance  Genitourinary: Negative  Negative for dysuria, frequency and urgency  Musculoskeletal: Positive for neck pain   Negative for myalgias  Skin: Negative  Negative for rash  Neurological: Positive for dizziness, numbness and headaches  Negative for tremors, seizures, syncope, facial asymmetry, speech difficulty, weakness and light-headedness  Patient states headaches every day  Patient states numbness in both hands and feet   Hematological: Negative  Does not bruise/bleed easily  Psychiatric/Behavioral: Positive for sleep disturbance  Negative for confusion and hallucinations

## 2022-09-01 ENCOUNTER — OFFICE VISIT (OUTPATIENT)
Dept: AUDIOLOGY | Age: 57
End: 2022-09-01
Payer: COMMERCIAL

## 2022-09-01 DIAGNOSIS — R42 VERTIGO: ICD-10-CM

## 2022-09-01 DIAGNOSIS — G43.009 MIGRAINE WITHOUT AURA AND WITHOUT STATUS MIGRAINOSUS, NOT INTRACTABLE: ICD-10-CM

## 2022-09-01 PROCEDURE — 92537 CALORIC VSTBLR TEST W/REC: CPT | Performed by: AUDIOLOGIST-HEARING AID FITTER

## 2022-09-01 PROCEDURE — 92567 TYMPANOMETRY: CPT | Performed by: AUDIOLOGIST-HEARING AID FITTER

## 2022-09-01 PROCEDURE — 92540 BASIC VESTIBULAR EVALUATION: CPT | Performed by: AUDIOLOGIST-HEARING AID FITTER
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
## 2022-10-06 DIAGNOSIS — K21.9 GASTROESOPHAGEAL REFLUX DISEASE, UNSPECIFIED WHETHER ESOPHAGITIS PRESENT: ICD-10-CM

## 2022-10-06 DIAGNOSIS — R10.30 LOWER ABDOMINAL PAIN: ICD-10-CM

## 2022-10-06 RX ORDER — FAMOTIDINE 20 MG/1
TABLET, FILM COATED ORAL
Qty: 180 TABLET | Refills: 1 | Status: SHIPPED | OUTPATIENT
Start: 2022-10-06

## 2022-12-22 ENCOUNTER — TELEPHONE (OUTPATIENT)
Dept: OTHER | Facility: HOSPITAL | Age: 57
End: 2022-12-22

## 2022-12-22 ENCOUNTER — TELEPHONE (OUTPATIENT)
Dept: GASTROENTEROLOGY | Facility: CLINIC | Age: 57
End: 2022-12-22

## 2022-12-22 DIAGNOSIS — R68.81 EARLY SATIETY: ICD-10-CM

## 2022-12-22 DIAGNOSIS — K58.0 IRRITABLE BOWEL SYNDROME WITH DIARRHEA: ICD-10-CM

## 2022-12-22 DIAGNOSIS — R14.0 BLOATING: ICD-10-CM

## 2022-12-22 DIAGNOSIS — K21.9 GASTROESOPHAGEAL REFLUX DISEASE WITHOUT ESOPHAGITIS: Primary | ICD-10-CM

## 2022-12-22 RX ORDER — PANTOPRAZOLE SODIUM 40 MG/1
40 TABLET, DELAYED RELEASE ORAL DAILY
Qty: 90 TABLET | Refills: 0 | Status: SHIPPED | OUTPATIENT
Start: 2022-12-22 | End: 2023-02-02

## 2022-12-22 NOTE — TELEPHONE ENCOUNTER
Spoke with patient  Patient reports for several weeks, he is having abdominal bloating and fullness  He is taking dicyclomine more frequerntly, which is helping  No fever or chills  He denies persistent diarrhea  He typically has a bowel movement every 2 days  I am ordering a GES, switching his PPI to pantoprazole and ordering Xifaxan  Clerical team, please schedule a follow-up with me early next year  Clinical team, just need your help with getting prior auth for XIafxan if neccessary  Thank you everyone! Regarding: Abdominal pain Rudi   Contact: [phone]  That is fine, when ever you have time, Again, that you so much for your help        Azul Domingo

## 2022-12-23 ENCOUNTER — TELEPHONE (OUTPATIENT)
Dept: GASTROENTEROLOGY | Facility: CLINIC | Age: 57
End: 2022-12-23

## 2022-12-23 NOTE — TELEPHONE ENCOUNTER
Attempted to do a PA for [facility] on cover my me ds, had to call patients plan  Called [employer] @ [phone]  Spoke to Ezequiel Haeys 3807  [facility] was approved  Approval # is : [id number]  Approval letter will be faxed over  Will then fax it to pharmacy and scan into patient chart  Will call patient to let them know

## 2022-12-23 NOTE — TELEPHONE ENCOUNTER
Called and spoke to patient  Let patient know that the [facility] was approved   Patient voiced understanding and had no further questions or concerns

## 2023-01-09 ENCOUNTER — TELEPHONE (OUTPATIENT)
Dept: GASTROENTEROLOGY | Facility: CLINIC | Age: 58
End: 2023-01-09

## 2023-01-09 ENCOUNTER — HOSPITAL ENCOUNTER (OUTPATIENT)
Dept: NUCLEAR MEDICINE | Facility: HOSPITAL | Age: 58
Discharge: HOME | End: 2023-01-09

## 2023-01-09 DIAGNOSIS — R68.81 EARLY SATIETY: ICD-10-CM

## 2023-01-09 DIAGNOSIS — K21.9 GASTROESOPHAGEAL REFLUX DISEASE WITHOUT ESOPHAGITIS: ICD-10-CM

## 2023-01-09 DIAGNOSIS — R14.0 BLOATING: ICD-10-CM

## 2023-01-09 NOTE — TELEPHONE ENCOUNTER
----- Message from Dane Tello PA-C sent at 1/9/2023  2:04 PM EST -----  Please let patient know that the gastric emptying test does show a delay  He should be eating much smaller portions, more frequently  He should avoid fatty foods and fatty cuts of meat  Also he needs a follow up with me if that’s not already scheduled  Thanks!

## 2023-01-09 NOTE — TELEPHONE ENCOUNTER
Called patient and got    LMOM with test results and also let patient know that he has a follow up appointment with Brenda Wilcox for 1/25/2023 @ 2:30 pm left office # as well

## 2023-01-30 DIAGNOSIS — G60.9 IDIOPATHIC PERIPHERAL NEUROPATHY: ICD-10-CM

## 2023-01-31 RX ORDER — GABAPENTIN 600 MG/1
TABLET ORAL
Qty: 540 TABLET | Refills: 3 | Status: SHIPPED | OUTPATIENT
Start: 2023-01-31

## 2023-02-02 ENCOUNTER — OFFICE VISIT (OUTPATIENT)
Dept: GASTROENTEROLOGY | Facility: CLINIC | Age: 58
End: 2023-02-02

## 2023-02-02 VITALS
HEART RATE: 90 BPM | BODY MASS INDEX: 28.06 KG/M2 | HEIGHT: 72 IN | SYSTOLIC BLOOD PRESSURE: 136 MMHG | DIASTOLIC BLOOD PRESSURE: 84 MMHG | WEIGHT: 207.2 LBS

## 2023-02-02 DIAGNOSIS — K21.9 GASTROESOPHAGEAL REFLUX DISEASE, UNSPECIFIED WHETHER ESOPHAGITIS PRESENT: ICD-10-CM

## 2023-02-02 DIAGNOSIS — K31.84 GASTROPARESIS: Primary | ICD-10-CM

## 2023-02-02 RX ORDER — ONDANSETRON 4 MG/1
4 TABLET, ORALLY DISINTEGRATING ORAL EVERY 6 HOURS PRN
Qty: 20 TABLET | Refills: 0 | Status: SHIPPED | OUTPATIENT
Start: 2023-02-02

## 2023-02-02 RX ORDER — OMEPRAZOLE 40 MG/1
40 CAPSULE, DELAYED RELEASE ORAL DAILY
Qty: 90 CAPSULE | Refills: 0 | Status: SHIPPED | OUTPATIENT
Start: 2023-02-02

## 2023-02-02 NOTE — PROGRESS NOTES
[facility]  Michelshon Koehler 65 y o  male MRN: [id number]       CC: Follow-up for positive gastric emptying study    HPI: Carla Rayo is a 81-year-old male with history of hypertension, hyperlipidemia, prediabetes, spinal stenosis and was a previous smoker  In late December, patient was calling in with symptoms of abdominal bloating and fullness  I recommended a Xifaxan course and to complete a gastric emptying study  The gastric emptying study revealed gastroparesis  Patient reports the Xifaxan did help somewhat with his bloating, but his fullness still present  He feels that omeprazole worked better than Protonix  He has history of prediabetes with an A1c of 5 7 in October 2022  He is going to have a annual appointment with his PCP in April 2023  He denies vomiting  He does experience nausea  Patient's last EGD and colonoscopy were in 2021  EGD revealing mild gastritis  Colonoscopy was normal except for left-sided diverticulosis  He was recommended a 10-year recall  Review of Systems:    CONSTITUTIONAL: Denies any fever, chills, or rigors  Good appetite, and no recent weight loss  HEENT: No earache or tinnitus  Denies hearing loss or visual disturbances  CARDIOVASCULAR: No chest pain or palpitations  RESPIRATORY: Denies any cough, hemoptysis, shortness of breath or dyspnea on exertion  GASTROINTESTINAL: As noted in the History of Present Illness  GENITOURINARY: No problems with urination  Denies any hematuria or dysuria  NEUROLOGIC: No dizziness or vertigo, denies headaches  MUSCULOSKELETAL: Denies any muscle or joint pain  SKIN: Denies skin rashes or itching  ENDOCRINE: Denies excessive thirst  Denies intolerance to heat or cold  PSYCHOSOCIAL: Denies depression or anxiety  Denies any recent memory loss         Current Outpatient Medications   Medication Sig Dispense Refill   • AJOVY 225 MG/1 5ML SOSY Inject 225 mg under the skin every 30 (thirty) days     • amLODIPine (NORVASC) 5 mg tablet      • celecoxib (CeleBREX) 200 mg capsule Take 200 mg by mouth daily     • Diclofenac Sodium 1 6 % GEL Place on the skin as needed     • dicyclomine (BENTYL) 20 mg tablet Take 1 tablet (20 mg total) by mouth every 6 (six) hours as needed (abdominal pain) 360 tablet 1   • Fluticasone Propionate (Xhance) 93 MCG/ACT EXHU      • gabapentin (NEURONTIN) 600 MG tablet TAKE 2 TABLETS BY MOUTH 3  TIMES DAILY 540 tablet 3   • lidocaine (LIDODERM) 5 % Apply up to 3 patches q day for 12 hours and then remove for 12 to affected area 90 patch 3   • loratadine-pseudoephedrine (CLARITIN-D 24-HOUR)  mg per 24 hr tablet Take 1 tablet by mouth daily     • losartan (COZAAR) 100 MG tablet Take 100 mg by mouth daily      • metaxalone (SKELAXIN) 800 mg tablet Take 800 mg by mouth 3 (three) times a day     • metoprolol succinate (TOPROL-XL) 25 mg 24 hr tablet Take 25 mg by mouth daily     • montelukast (SINGULAIR) 10 mg tablet      • omeprazole (PriLOSEC) 40 MG capsule Take 1 capsule (40 mg total) by mouth daily 90 capsule 0   • ondansetron (ZOFRAN-ODT) 4 mg disintegrating tablet Take 1 tablet (4 mg total) by mouth every 6 (six) hours as needed for nausea or vomiting 20 tablet 0   • Rimegepant Sulfate (Nurtec) 75 MG TBDP Daily     • rosuvastatin (CRESTOR) 5 mg tablet Take 1 tablet by mouth daily     • solifenacin (VESICARE) 5 mg tablet Take 5 mg by mouth daily     • traMADol (ULTRAM-ER) 300 MG 24 hr tablet Take 300 mg by mouth daily     • acyclovir (ZOVIRAX) 800 mg tablet acyclovir 800 mg tablet   TAKE 1 TABLET (800 MG TOTAL) BY MOUTH 5 (FIVE) TIMES A DAY FOR 7 DAYS     • amoxicillin (AMOXIL) 500 mg capsule amoxicillin 500 mg capsule   TAKE 1 CAPSULE (500 MG TOTAL) BY MOUTH 3 (THREE) TIMES A DAY FOR 14 DAYS       • amoxicillin-clavulanate (AUGMENTIN) 875-125 mg per tablet TAKE 1 TABLET BY MOUTH EVERY 12 HOURS FOR 7 DAYS (Patient not taking: No sig reported)     • Diclofenac Sodium (VOLTAREN) 1 % diclofenac 1 % topical gel   APPLY 2 G 4 TIMES A DAY BY TOPICAL ROUTE AS NEEDED FOR 30 DAYS  • doxycycline (ADOXA) 100 MG tablet TAKE 1 TABLET BY MOUTH TWICE A DAY FOR 7 DAYS (Patient not taking: No sig reported)     • famotidine (PEPCID) 20 mg tablet TAKE 1 TABLET BY MOUTH TWICE A  tablet 1   • fluticasone (FLONASE) 50 mcg/act nasal spray      • fremanezumab-vfrm (Ajovy) 225 MG/1 5ML prefilled syringe Ajovy Syringe 225 mg/1 5 mL subcutaneous   INJECT 1 5 ML EVERY MONTH BY SUBCUTANEOUS ROUTE  (Patient not taking: Reported on 10/4/2022)     • hydrochlorothiazide (HYDRODIURIL) 25 mg tablet Take 25 mg by mouth daily     • HYDROcodone-acetaminophen (NORCO) 7 5-325 mg per tablet hydrocodone 7 5 mg-acetaminophen 325 mg tablet   TAKE 1 TABLET EVERY 4 6 HOURS BY ORAL ROUTE AS NEEDED  • ibuprofen (MOTRIN) 800 mg tablet ibuprofen 800 mg tablet   TAKE 1 TABLET BY MOUTH THREE TIMES A DAY     • levocetirizine (XYZAL) 5 MG tablet  (Patient not taking: Reported on 10/4/2022)     • meloxicam (MOBIC) 15 mg tablet meloxicam 15 mg tablet     • methylprednisolone (MEDROL) 4 mg tablet methylprednisolone 4 mg tablets in a dose pack   TAKE 6 TABLETS ON DAY 1 AS DIRECTED ON PACKAGE AND DECREASE BY 1 TAB EACH DAY FOR A TOTAL OF 6 DAYS (Patient not taking: No sig reported)     • mometasone (NASONEX) 50 mcg/act nasal spray  (Patient not taking: Reported on 10/4/2022)     • Paxlovid, 300/100, tablet therapy pack PLEASE SEE ATTACHED FOR DETAILED DIRECTIONS     • rifaximin (Xifaxan) 550 mg tablet Xifaxan 550 mg tablet   TAKE 1 TABLET (550 MG TOTAL) BY MOUTH EVERY 8 HOURS FOR 14 DAYS     • Zinc Sulfate (ZINC 15 PO) zinc   Take 1 tablet (1,100mg) by mouth Once Daily  No current facility-administered medications for this visit       Past Medical History:   Diagnosis Date   • Hyperlipidemia    • Hypertension      Past Surgical History:   Procedure Laterality Date   • ANKLE LIGAMENT RECONSTRUCTION Right 2008   • ARTHROSCOPY KNEE Left 05/06/2019    x2 prior tear 2018   • KNEE SURGERY Right    • ROTATOR CUFF REPAIR Left 2017    4 anchors   • SHOULDER SURGERY Left 05/25/2021   • ULNAR NERVE TRANSPOSITION Bilateral 2010   • WRIST FUSION Right 12/06/2019   • WRIST SURGERY Right 2017/18     Social History     Socioeconomic History   • Marital status: /Civil Union     Spouse name: None   • Number of children: None   • Years of education: None   • Highest education level: None   Occupational History   • None   Tobacco Use   • Smoking status: Never   • Smokeless tobacco: Never   Vaping Use   • Vaping Use: Never used   Substance and Sexual Activity   • Alcohol use: Not Currently     Alcohol/week: 2 0 standard drinks     Types: 2 Cans of beer per week   • Drug use: Never   • Sexual activity: None   Other Topics Concern   • None   Social History Narrative   • None     Social Determinants of Health     Financial Resource Strain: Not on file   Food Insecurity: Not on file   Transportation Needs: Not on file   Physical Activity: Not on file   Stress: Not on file   Social Connections: Not on file   Intimate Partner Violence: Not on file   Housing Stability: Not on file     Family History   Problem Relation Age of Onset   • Hypertension Mother    • Hypertension Father    • Kidney cancer Father             PHYSICAL EXAM:    Vitals:    02/02/23 1238   BP: 136/84   Pulse: 90   Weight: 94 kg (207 lb 3 2 oz)   Height: 6' (1 829 m)     General Appearance:   Alert and oriented x 3   Cooperative, and in no respiratory distress   HEENT:   Normocephalic, atraumatic, anicteric      Neck:  Supple, symmetrical, trachea midline   Lungs:   Clear to auscultation bilaterally    Heart[de-identified]   Regular rate and rhythm   Abdomen:   Soft, non-tender, moderately-distended; normal bowel sounds; no masses, no organomegaly    Genitalia:   Deferred    Rectal:   Deferred    Extremities:  No cyanosis, clubbing or edema    Pulses:  2+ and symmetric all extremities    Skin:  Skin color, texture, turgor normal, no rashes or lesions    Lymph nodes:  No palpable cervical or supraclavicular lymphadenopathy        Lab Results:             Invalid input(s): LABALBU            Imaging Studies: I have personally reviewed pertinent imaging studies  NM gastric emptying    Result Date: 1/9/2023  Impression: Mildly decreased rate of gastric emptying  Workstation performed: [id number]       ASSESSMENT and PLAN:      1) Newly diagnosed gastroparesis - Patient has long history of prediabetes with an A1c of 5 7  His last TSH was checked 2 years ago  - Recheck TSH  - Glycemic control   - After discussion, patient is most interested in domperidone consultation with Dr Clemente Jaffe  We discussed that this medication is not FDA approved, and will involve out of pocket cost  It is not associated with tardive dyskinesia which appeals to the patient  - In the meantime, small frequent meals avoiding large amounts of high fiber foods or fatty cuts of meat   - Zofran as needed  - Switch back to omeprazole         Follow up with Dr Clemente Jaffe for domperidone consultation

## 2023-02-28 ENCOUNTER — OFFICE VISIT (OUTPATIENT)
Dept: GASTROENTEROLOGY | Facility: CLINIC | Age: 58
End: 2023-02-28

## 2023-02-28 ENCOUNTER — TELEMEDICINE (OUTPATIENT)
Dept: NEUROLOGY | Facility: CLINIC | Age: 58
End: 2023-02-28

## 2023-02-28 VITALS
BODY MASS INDEX: 28.04 KG/M2 | HEIGHT: 72 IN | TEMPERATURE: 99.9 F | WEIGHT: 207 LBS | HEART RATE: 87 BPM | SYSTOLIC BLOOD PRESSURE: 130 MMHG | DIASTOLIC BLOOD PRESSURE: 90 MMHG | RESPIRATION RATE: 17 BRPM | OXYGEN SATURATION: 97 %

## 2023-02-28 DIAGNOSIS — K31.84 GASTROPARESIS: Primary | ICD-10-CM

## 2023-02-28 DIAGNOSIS — G60.9 IDIOPATHIC PERIPHERAL NEUROPATHY: Primary | ICD-10-CM

## 2023-02-28 RX ORDER — METOCLOPRAMIDE 5 MG/1
5 TABLET ORAL 4 TIMES DAILY
Qty: 124 TABLET | Refills: 3 | Status: SHIPPED | OUTPATIENT
Start: 2023-02-28

## 2023-02-28 RX ORDER — SUMATRIPTAN 25 MG/1
TABLET, FILM COATED ORAL
COMMUNITY

## 2023-02-28 RX ORDER — DIPHENOXYLATE HYDROCHLORIDE AND ATROPINE SULFATE 2.5; .025 MG/1; MG/1
1 TABLET ORAL DAILY
COMMUNITY

## 2023-02-28 NOTE — PROGRESS NOTES
[facility] - Outpatient Follow-up Note  Leigh Ann Hernandez 65 y o  male MRN: [id number]  Encounter: [id number]          ASSESSMENT AND PLAN:      1  Gastroparesis generated  - metoclopramide (REGLAN) 5 mg tablet; Take 1 tablet (5 mg total) by mouth 4 (four) times a day  Dispense: 124 tablet; Refill: 3    Due to the potential side effect of cardiac arrhythmias including sudden death, a rare complication versus tardive dyskinesia related to the use of Reglan, a rare complication, it has been my general practice to not proceed with domperidone before trying Reglan at a low doses of 5 mg 4 times a day  This is especially relevant to the fact the domperidone is not FDA approved and is an out-of-pocket expense, potentially for years to come    Follow-up in 1 month with Alma Rosa Headings  Once again, the patient has been advised about the tardive dyskinesia symptoms and was instructed to contact the office if he notices any Unusual tic or tremor  ______________________________________________________________________    SUBJECTIVEValeta Jeanna is a 64-year-old male who was recently diagnosed as having a mild evidence of gastroparesis beginning at 3 hours and evident at 4 hours as well  He is complaining of nausea without vomiting  He is also complaining of an upper abdominal pain which radiates down to the lower abdomen and this is primarily a preprandial symptom  This is not a postprandial symptom  It seems to be worse in the morning time and he states that he usually eats a small amount in the morning  It also is prevalent sometime before dinner  He is experiencing 5 episodes per week  He states that this whole problem began around November 2022  He sometimes uses dicyclomine with improvement  He is also taking omeprazole 40 mg daily  REVIEW OF SYSTEMS IS OTHERWISE NEGATIVE        Historical Information   Past Medical History:   Diagnosis Date   • GERD (gastroesophageal reflux disease)    • Hiatal hernia    • Hyperlipidemia    • Hypertension      Past Surgical History:   Procedure Laterality Date   • ANKLE LIGAMENT RECONSTRUCTION Right 2008   • ARTHROSCOPY KNEE Left 05/06/2019    x2 prior tear 2018   • KNEE SURGERY Right    • ROTATOR CUFF REPAIR Left 2017    4 anchors   • SHOULDER SURGERY Left 05/25/2021   • ULNAR NERVE TRANSPOSITION Bilateral 2010   • WRIST FUSION Right 12/06/2019   • WRIST SURGERY Right 2017/18     Social History   Social History     Substance and Sexual Activity   Alcohol Use Not Currently   • Alcohol/week: 2 0 standard drinks   • Types: 2 Cans of beer per week    Comment: as per scripts     Social History     Substance and Sexual Activity   Drug Use Never     Social History     Tobacco Use   Smoking Status Never   Smokeless Tobacco Never     Family History   Problem Relation Age of Onset   • Hypertension Mother    • Hypertension Father    • Kidney cancer Father        Meds/Allergies       Current Outpatient Medications:   •  AJOVY 225 MG/1 5ML SOSY  •  amLODIPine (NORVASC) 5 mg tablet  •  celecoxib (CeleBREX) 200 mg capsule  •  Diclofenac Sodium 1 6 % GEL  •  dicyclomine (BENTYL) 20 mg tablet  •  FIBER ADULT GUMMIES PO  •  Fluticasone Propionate (Xhance) 93 MCG/ACT EXHU  •  gabapentin (NEURONTIN) 600 MG tablet  •  hydrochlorothiazide (HYDRODIURIL) 25 mg tablet  •  lidocaine (LIDODERM) 5 %  •  loratadine-pseudoephedrine (CLARITIN-D 24-HOUR)  mg per 24 hr tablet  •  losartan (COZAAR) 100 MG tablet  •  metaxalone (SKELAXIN) 800 mg tablet  •  metoclopramide (REGLAN) 5 mg tablet  •  metoprolol succinate (TOPROL-XL) 25 mg 24 hr tablet  •  montelukast (SINGULAIR) 10 mg tablet  •  multivitamin (THERAGRAN) TABS  •  omeprazole (PriLOSEC) 40 MG capsule  •  ondansetron (ZOFRAN-ODT) 4 mg disintegrating tablet  •  rosuvastatin (CRESTOR) 5 mg tablet  •  solifenacin (VESICARE) 5 mg tablet  •  SUMAtriptan (IMITREX) 25 mg tablet  •  traMADol (ULTRAM-ER) 300 MG 24 hr tablet  • Rimegepant Sulfate (Nurtec) 75 MG TBDP    Allergies   Allergen Reactions   • Atorvastatin      disorientation   • Lisinopril Cough   • Seasonal Ic  [Cholestatin]      Annotation - [id number]: *HAYFEVER*           Objective     Blood pressure 130/90, pulse 87, temperature 99 9 °F (37 7 °C), temperature source Tympanic, resp  rate 17, height 6' (1 829 m), weight 93 9 kg (207 lb), SpO2 97 %  Body mass index is 28 07 kg/m²  PHYSICAL EXAM:      General Appearance:   Alert, cooperative, no distress   HEENT:   Normocephalic, atraumatic, anicteric      Neck:  Supple, symmetrical, trachea midline   Lungs:   Clear to auscultation bilaterally; no rales, rhonchi or wheezing; respirations unlabored    Heart[de-identified]   Regular rate and rhythm; no murmur, rub, or gallop  Abdomen:   Soft, non-tender, non-distended; normal bowel sounds; no masses, no organomegaly    Genitalia:   Deferred    Rectal:   Deferred    Extremities:  No cyanosis, clubbing or edema    Pulses:  2+ and symmetric    Skin:  No jaundice, rashes, or lesions    Lymph nodes:  No palpable cervical lymphadenopathy        Lab Results:   No visits with results within 1 Day(s) from this visit     Latest known visit with results is:   Admission on 06/13/2022, Discharged on 06/14/2022   Component Date Value   • WBC 06/13/2022 5 76    • RBC 06/13/2022 4 66    • Hemoglobin 06/13/2022 14 9    • Hematocrit 06/13/2022 43 3    • MCV 06/13/2022 93    • MCH 06/13/2022 32 0    • MCHC 06/13/2022 34 4    • RDW 06/13/2022 12 3    • MPV 06/13/2022 9 9    • Platelets 66/74/7869 268    • nRBC 06/13/2022 0    • Neutrophils Relative 06/13/2022 72    • Immat GRANS % 06/13/2022 0    • Lymphocytes Relative 06/13/2022 19    • Monocytes Relative 06/13/2022 8    • Eosinophils Relative 06/13/2022 1    • Basophils Relative 06/13/2022 0    • Neutrophils Absolute 06/13/2022 4 16    • Immature Grans Absolute 06/13/2022 0 01    • Lymphocytes Absolute 06/13/2022 1 12    • Monocytes Absolute 06/13/2022 0 43    • Eosinophils Absolute 06/13/2022 0 03    • Basophils Absolute 06/13/2022 0 01    • Sodium 06/13/2022 141    • Potassium 06/13/2022 4 3    • Chloride 06/13/2022 105    • CO2 06/13/2022 29    • ANION GAP 06/13/2022 7    • BUN 06/13/2022 13    • Creatinine 06/13/2022 1 03    • Glucose 06/13/2022 98    • Calcium 06/13/2022 8 4    • AST 06/13/2022 16    • ALT 06/13/2022 34    • Alkaline Phosphatase 06/13/2022 107    • Total Protein 06/13/2022 6 8    • Albumin 06/13/2022 3 6    • Total Bilirubin 06/13/2022 0 27    • eGFR 06/13/2022 80    • Magnesium 06/13/2022 2 2    • Lipase 06/13/2022 71 (L)    • hs TnI 0hr 06/13/2022 2    • SARS-CoV-2 06/13/2022 Negative    • INFLUENZA A PCR 06/13/2022 Negative    • INFLUENZA B PCR 06/13/2022 Negative    • RSV PCR 06/13/2022 Negative    • hs TnI 2hr 06/14/2022 2    • Delta 2hr hsTnI 06/14/2022 0    • Ventricular Rate 06/13/2022 117    • Atrial Rate 06/13/2022 117    • MN Interval 06/13/2022 156    • QRSD Interval 06/13/2022 82    • QT Interval 06/13/2022 322    • QTC Interval 06/13/2022 449    • P Axis 06/13/2022 56    • QRS Axis 06/13/2022 86    • T Wave Axis 06/13/2022 33          Radiology Results:   No results found    Answers for HPI/ROS submitted by the patient on 2/23/2023  Chronicity: recurrent  Onset: more than 1 month ago  Onset quality: gradual  Frequency: constantly  Episode duration: 4 Hours  Progression since onset: waxing and waning  Pain location: generalized abdominal region  Pain - numeric: 7/10  Pain quality: dull, a sensation of fullness  Radiates to: LLQ, LUQ, RLQ, RUQ, epigastric region, periumbilical region, suprapubic region, left flank, right flank  anorexia: Yes  arthralgias: Yes  belching: Yes  constipation: Yes  diarrhea: No  dysuria: No  fever: No  flatus: No  frequency: No  headaches: Yes  hematochezia: No  hematuria: No  melena: No  myalgias: No  nausea: Yes  weight loss: No  vomiting: No  Aggravated by: eating  Relieved by: belching, bowel movements, certain positions, passing flatus  Diagnostic workup: GI consult, lower endoscopy, upper endoscopy

## 2023-02-28 NOTE — PROGRESS NOTES
Virtual Regular Visit    Verification of patient location:    Patient is located in the following state in which I hold an active license [place]    Plan:  Continue gabapentin  Follow-up 6 months    Discussion:   Rodrigue Lira reports that his symptoms of neuropathic pain are under good control with gabapentin  He has not noticed any change in balance  We will continue with current management and I will see him back in 6 months    Problem List Items Addressed This Visit        Nervous and Auditory    Peripheral neuropathy - Primary            Reason for visit is   Chief Complaint   Patient presents with   • Virtual Regular Visit        Encounter provider Zelda Armando MD    Provider located at [address] [id number]      Recent Visits  No visits were found meeting these conditions  Showing recent visits within past 7 days and meeting all other requirements  Today's Visits  Date Type Provider Dept   02/28/23 Telemedicine Zelda Armando  [facility] [address] today's visits and meeting all other requirements  Future Appointments  No visits were found meeting these conditions  Showing future appointments within next 150 days and meeting all other requirements       The patient was identified by name and date of birth  Caroline New was informed that this is a telemedicine visit and that the visit is being conducted through [employer] via [address] [place] [address] in lieu of office visit in the face of ongoing [place] viral epidemic  Patient understands that this format is not completely HIPPA compliant, however I am sitting in my office alone with the door closed and patient is in a comfortable environment to speak with me about current health issues  He acknowledged consent and understanding of privacy and security of the video platform   The patient has agreed to participate and understands they can discontinue the visit at any time  Patient is aware this is a billable service  Subjective  Dominique Guillen is a 65 y o  male reports no change in his symptoms of neuropathy  He states the gabapentin continues to control his neuropathic pain symptoms and has not noticed any significant change in balance  He continues to work with Dr Oziel Ibarra with [facility] at [facility] regarding his concussion and residual headaches  He states Nurtec was effective in controlling his headaches but was not covered by his insurance  He is currently using 25 mg of Imitrex and he uses this 3 times a a week at most   He will continue to follow with him regarding the symptoms    He denies any other new issues      HPI     Past Medical History:   Diagnosis Date   • Hyperlipidemia    • Hypertension        Past Surgical History:   Procedure Laterality Date   • ANKLE LIGAMENT RECONSTRUCTION Right 2008   • ARTHROSCOPY KNEE Left 05/06/2019    x2 prior tear 2018   • KNEE SURGERY Right    • ROTATOR CUFF REPAIR Left 2017    4 anchors   • SHOULDER SURGERY Left 05/25/2021   • ULNAR NERVE TRANSPOSITION Bilateral 2010   • WRIST FUSION Right 12/06/2019   • WRIST SURGERY Right 2017/18       Current Outpatient Medications   Medication Sig Dispense Refill   • AJOVY 225 MG/1 5ML SOSY Inject 225 mg under the skin every 30 (thirty) days     • amLODIPine (NORVASC) 5 mg tablet      • celecoxib (CeleBREX) 200 mg capsule Take 200 mg by mouth daily     • Diclofenac Sodium 1 6 % GEL Place on the skin as needed     • dicyclomine (BENTYL) 20 mg tablet Take 1 tablet (20 mg total) by mouth every 6 (six) hours as needed (abdominal pain) 360 tablet 1   • Fluticasone Propionate (Xhance) 93 MCG/ACT EXHU      • gabapentin (NEURONTIN) 600 MG tablet TAKE 2 TABLETS BY MOUTH 3  TIMES DAILY 540 tablet 3   • hydrochlorothiazide (HYDRODIURIL) 25 mg tablet Take 25 mg by mouth daily     • lidocaine (LIDODERM) 5 % Apply up to 3 patches q day for 12 hours and then remove for 12 to affected area 90 patch 3   • loratadine-pseudoephedrine (CLARITIN-D 24-HOUR)  mg per 24 hr tablet Take 1 tablet by mouth daily     • losartan (COZAAR) 100 MG tablet Take 100 mg by mouth daily      • metaxalone (SKELAXIN) 800 mg tablet Take 800 mg by mouth 3 (three) times a day     • metoprolol succinate (TOPROL-XL) 25 mg 24 hr tablet Take 25 mg by mouth daily     • montelukast (SINGULAIR) 10 mg tablet      • omeprazole (PriLOSEC) 40 MG capsule Take 1 capsule (40 mg total) by mouth daily 90 capsule 0   • ondansetron (ZOFRAN-ODT) 4 mg disintegrating tablet Take 1 tablet (4 mg total) by mouth every 6 (six) hours as needed for nausea or vomiting 20 tablet 0   • rosuvastatin (CRESTOR) 5 mg tablet Take 1 tablet by mouth daily     • solifenacin (VESICARE) 5 mg tablet Take 5 mg by mouth daily     • SUMAtriptan (IMITREX) 25 mg tablet sumatriptan 25 mg tablet   TAKE 1 TAB AT ONSET OF MIGRAINE  MAY REPEAT DOSE AFTER 2 HOURS IF NEEDED  MAXIMUM DAILY DOSE 200MG     • traMADol (ULTRAM-ER) 300 MG 24 hr tablet Take 300 mg by mouth daily     • Rimegepant Sulfate (Nurtec) 75 MG TBDP Daily (Patient not taking: Reported on 2/28/2023)       No current facility-administered medications for this visit  Allergies   Allergen Reactions   • Atorvastatin      disorientation   • Lisinopril Cough   • Seasonal Ic  [Cholestatin]      Annotation - [id number]: *HAYFEVER*       Review of Systems    Video Exam    There were no vitals filed for this visit  Physical Exam   GENERAL: Well-developed well-nourished man in no acute distress  HEENT/NECK: Head is atraumatic normocephalic, neck is supple  NEUROLOGIC:  Mental Status: Awake and alert without aphasia  Cranial Nerves: Extraocular movements are full   Face is symmetrical    Total of 7 minutes was spent face-to-face with patient

## 2023-04-03 NOTE — ANESTHESIA POSTPROCEDURE EVALUATION
Post-Op Assessment Note    CV Status:  Stable  Pain Score: 0    Pain management: adequate     Mental Status:  Sleepy   Hydration Status:  Stable   PONV Controlled:  None   Airway Patency:  Patent      Post Op Vitals Reviewed: Yes      Staff: CRNA         No complications documented      BP   122/65   Temp      Pulse  80   Resp   20   SpO2   100 Melolabial Transposition Flap Text: The defect edges were debeveled with a #15 scalpel blade.  Given the location of the defect and the proximity to free margins a melolabial flap was deemed most appropriate.  Using a sterile surgical marker, an appropriate melolabial transposition flap was drawn incorporating the defect.    The area thus outlined was incised deep to adipose tissue with a #15 scalpel blade.  The skin margins were undermined to an appropriate distance in all directions utilizing iris scissors.

## 2023-06-13 DIAGNOSIS — K21.9 GASTROESOPHAGEAL REFLUX DISEASE, UNSPECIFIED WHETHER ESOPHAGITIS PRESENT: ICD-10-CM

## 2023-06-13 RX ORDER — OMEPRAZOLE 40 MG/1
40 CAPSULE, DELAYED RELEASE ORAL DAILY
Qty: 90 CAPSULE | Refills: 0 | Status: SHIPPED | OUTPATIENT
Start: 2023-06-13 | End: 2023-06-19 | Stop reason: SDUPTHER

## 2023-06-15 RX ORDER — PREDNISONE 20 MG/1
TABLET ORAL
COMMUNITY
Start: 2023-04-28

## 2023-06-15 RX ORDER — PREDNISONE 20 MG/1
TABLET ORAL
COMMUNITY

## 2023-06-15 RX ORDER — AMOXICILLIN AND CLAVULANATE POTASSIUM 875; 125 MG/1; MG/1
TABLET, FILM COATED ORAL
COMMUNITY
Start: 2023-04-28

## 2023-06-19 ENCOUNTER — OFFICE VISIT (OUTPATIENT)
Dept: GASTROENTEROLOGY | Facility: CLINIC | Age: 58
End: 2023-06-19
Payer: COMMERCIAL

## 2023-06-19 VITALS
BODY MASS INDEX: 28.17 KG/M2 | SYSTOLIC BLOOD PRESSURE: 140 MMHG | HEART RATE: 80 BPM | DIASTOLIC BLOOD PRESSURE: 82 MMHG | HEIGHT: 72 IN | WEIGHT: 208 LBS

## 2023-06-19 DIAGNOSIS — K31.84 GASTROPARESIS: ICD-10-CM

## 2023-06-19 DIAGNOSIS — K58.0 IRRITABLE BOWEL SYNDROME WITH DIARRHEA: Primary | ICD-10-CM

## 2023-06-19 DIAGNOSIS — K21.9 GASTROESOPHAGEAL REFLUX DISEASE, UNSPECIFIED WHETHER ESOPHAGITIS PRESENT: ICD-10-CM

## 2023-06-19 PROCEDURE — 99214 OFFICE O/P EST MOD 30 MIN: CPT | Performed by: PHYSICIAN ASSISTANT

## 2023-06-19 RX ORDER — RIMEGEPANT SULFATE 75 MG/75MG
TABLET, ORALLY DISINTEGRATING ORAL
COMMUNITY
Start: 2023-06-08

## 2023-06-19 RX ORDER — OMEPRAZOLE 40 MG/1
40 CAPSULE, DELAYED RELEASE ORAL 2 TIMES DAILY
Qty: 120 CAPSULE | Refills: 0 | Status: SHIPPED | OUTPATIENT
Start: 2023-06-19 | End: 2023-08-18

## 2023-06-19 NOTE — PROGRESS NOTES
[facility]  Mercy Sanchez 63 y o  male MRN: [id number]       CC: Follow-up for gastroparesis, abdominal bloating    HPI: Tony Arreguin is a pleasant 28-year-old male with history of hypertension, hyperlipidemia, prediabetes, spinal stenosis and previous tobacco abuse  Patient is here for follow-up after recent diagnosis of gastroparesis being trialed on Reglan 3-4 times a day  Patient reports that he is still having abdominal bloating and discomfort in the upper and lower abdomen  Patient reports that he has been avoiding GERD triggers, and is still seeing no improvement in his gas buildup symptoms  Patient's last EGD and colonoscopy were in 2021   EGD revealing mild gastritis   Colonoscopy was normal except for left-sided diverticulosis   He was recommended a 10-year recall        Review of Systems:    CONSTITUTIONAL: Denies any fever, chills, or rigors  Good appetite, and no recent weight loss  HEENT: No earache or tinnitus  Denies hearing loss or visual disturbances  CARDIOVASCULAR: No chest pain or palpitations  RESPIRATORY: Denies any cough, hemoptysis, shortness of breath or dyspnea on exertion  GASTROINTESTINAL: As noted in the History of Present Illness  GENITOURINARY: No problems with urination  Denies any hematuria or dysuria  NEUROLOGIC: No dizziness or vertigo, denies headaches  MUSCULOSKELETAL: Denies any muscle or joint pain  SKIN: Denies skin rashes or itching  ENDOCRINE: Denies excessive thirst  Denies intolerance to heat or cold  PSYCHOSOCIAL: Denies depression or anxiety  Denies any recent memory loss         Current Outpatient Medications   Medication Sig Dispense Refill   • AJOVY 225 MG/1 5ML SOSY Inject 225 mg under the skin every 30 (thirty) days     • amLODIPine (NORVASC) 10 mg tablet      • celecoxib (CeleBREX) 200 mg capsule Take 200 mg by mouth daily     • Diclofenac Sodium 1 6 % GEL Place on the skin as needed     • dicyclomine (BENTYL) 20 mg tablet Take 1 tablet (20 mg total) by mouth every 6 (six) hours as needed (abdominal pain) 360 tablet 1   • FIBER ADULT GUMMIES PO Take by mouth     • Fluticasone Propionate (Xhance) 93 MCG/ACT EXHU      • gabapentin (NEURONTIN) 600 MG tablet TAKE 2 TABLETS BY MOUTH 3  TIMES DAILY 540 tablet 3   • levocetirizine (XYZAL) 5 MG tablet levocetirizine 5 mg tablet   Take 1 tablet (5mg) by mouth Once Daily  • lidocaine (LIDODERM) 5 % Apply up to 3 patches q day for 12 hours and then remove for 12 to affected area 90 patch 3   • loratadine-pseudoephedrine (CLARITIN-D 24-HOUR)  mg per 24 hr tablet Take 1 tablet by mouth daily     • losartan (COZAAR) 100 MG tablet Take 100 mg by mouth daily      • metaxalone (SKELAXIN) 800 mg tablet Take 800 mg by mouth 3 (three) times a day     • metoclopramide (REGLAN) 5 mg tablet Take 1 tablet (5 mg total) by mouth 4 (four) times a day 124 tablet 3   • metoprolol succinate (TOPROL-XL) 25 mg 24 hr tablet Take 25 mg by mouth daily     • montelukast (SINGULAIR) 10 mg tablet      • multivitamin (THERAGRAN) TABS Take 1 tablet by mouth daily     • omeprazole (PriLOSEC) 40 MG capsule TAKE 1 CAPSULE (40 MG TOTAL) BY MOUTH DAILY  90 capsule 0   • ondansetron (ZOFRAN-ODT) 4 mg disintegrating tablet Take 1 tablet (4 mg total) by mouth every 6 (six) hours as needed for nausea or vomiting 20 tablet 0   • rimegepant sulfate (Nurtec) 75 mg TBDP Nurtec ODT 75 mg disintegrating tablet   1 tablet PO daily PRN breakthrough migraine  Do not exceed 1 tablet in 24 hour period  • rizatriptan (MAXALT) 5 mg tablet rizatriptan 5 mg tablet   1 tablet PO at onset of severe migraine  May repeat dose after 2 hours if migraine remains  Maximum daily dose 30mg       • rosuvastatin (CRESTOR) 5 mg tablet Take 1 tablet by mouth daily     • solifenacin (VESICARE) 5 mg tablet Take 5 mg by mouth daily     • traMADol (ULTRAM-ER) 300 MG 24 hr tablet Take 300 mg by mouth daily     • amLODIPine (NORVASC) 5 mg tablet (Patient not taking: Reported on 4/14/2023)     • amoxicillin-clavulanate (AUGMENTIN) 875-125 mg per tablet TAKE 1 TABLET BY MOUTH EVERY 12 HOURS FOR 7 DAYS     • famotidine (PEPCID) 20 mg tablet famotidine 20 mg tablet   Take 1 tablet (20mg) by mouth Twice Daily  (Patient not taking: Reported on 4/14/2023)     • hydrochlorothiazide (HYDRODIURIL) 25 mg tablet Take 25 mg by mouth daily     • predniSONE 20 mg tablet prednisone 20 mg tablet   TAKE 1 TABLET (20 MG TOTAL) BY MOUTH DAILY FOR 5 DAYS  • predniSONE 20 mg tablet TAKE 1 TABLET (20 MG TOTAL) BY MOUTH DAILY FOR 5 DAYS  • SUMAtriptan (IMITREX) 25 mg tablet sumatriptan 25 mg tablet   TAKE 1 TAB AT ONSET OF MIGRAINE  MAY REPEAT DOSE AFTER 2 HOURS IF NEEDED  MAXIMUM DAILY DOSE 200MG       No current facility-administered medications for this visit       Past Medical History:   Diagnosis Date   • GERD (gastroesophageal reflux disease)    • Hiatal hernia    • Hyperlipidemia    • Hypertension      Past Surgical History:   Procedure Laterality Date   • ANKLE LIGAMENT RECONSTRUCTION Right 2008   • ARTHROSCOPY KNEE Left 05/06/2019    x2 prior tear 2018   • KNEE SURGERY Right    • ROTATOR CUFF REPAIR Left 2017    4 anchors   • SHOULDER SURGERY Left 05/25/2021   • ULNAR NERVE TRANSPOSITION Bilateral 2010   • WRIST FUSION Right 12/06/2019   • WRIST SURGERY Right 2017/18     Social History     Socioeconomic History   • Marital status: /Civil Union     Spouse name: None   • Number of children: None   • Years of education: None   • Highest education level: None   Occupational History   • None   Tobacco Use   • Smoking status: Never   • Smokeless tobacco: Never   Vaping Use   • Vaping Use: Never used   Substance and Sexual Activity   • Alcohol use: Not Currently     Alcohol/week: 2 0 standard drinks of alcohol     Types: 2 Cans of beer per week     Comment: as per scripts   • Drug use: Never   • Sexual activity: Yes     Partners: Female   Other Topics Concern   • "None   Social History Narrative   • None     Social Determinants of Health     Financial Resource Strain: Not on file   Food Insecurity: Not on file   Transportation Needs: Not on file   Physical Activity: Not on file   Stress: Not on file   Social Connections: Not on file   Intimate Partner Violence: Not on file   Housing Stability: Not on file     Family History   Problem Relation Age of Onset   • Hypertension Mother    • Hypertension Father    • Kidney cancer Father             PHYSICAL EXAM:    Vitals:    06/19/23 1529   BP: 140/82   BP Location: Left arm   Patient Position: Sitting   Pulse: 80   Weight: 94 3 kg (208 lb)   Height: 6' (1 829 m)     General Appearance:   Alert and oriented x 3  Cooperative, and in no respiratory distress   HEENT:   Normocephalic, atraumatic, anicteric      Neck:  Supple, symmetrical, trachea midline   Lungs:   Clear to auscultation bilaterally    Heart[de-identified]   Regular rate and rhythm   Abdomen:   Soft, non-tender, non-distended; normal bowel sounds; no masses, no organomegaly    Genitalia:   Deferred    Rectal:   Deferred    Extremities:  No cyanosis, clubbing or edema    Pulses:  2+ and symmetric all extremities    Skin:  Skin color, texture, turgor normal, no rashes or lesions    Lymph nodes:  No palpable cervical or supraclavicular lymphadenopathy        Lab Results:             Invalid input(s): \"LABALBU\"            Imaging Studies: I have personally reviewed pertinent imaging studies  MRI brain wo contrast    Result Date: 4/16/2023  Impression: No acute infarct seen No mass effect or midline shift seen Workstation performed: [id number]       ASSESSMENT and PLAN:      1) Gastroparesis - Patient has been on Reglan consistently for at least 2 months now  He is not seeing much benefit, reports that he is no better or no worse on Reglan  He does not feel comfortable domperidone   - We discussed trial of Botox to the pylorus    Patient would like to move forward with scheduling   - " First, we will trial Xifaxan as he may also have an element of SIBO  - Otherwise, we will also increase his omeprazole to twice daily    30 minutes before breakfast and 30 minutes before dinner      Follow up after EGD with Botox

## 2023-06-19 NOTE — H&P (VIEW-ONLY)
[facility]  Zita Paula 63 y.o. male MRN: [id number]       CC: Follow-up for gastroparesis, abdominal bloating    HPI: Kathya Ponce is a pleasant 67-year-old male with history of hypertension, hyperlipidemia, prediabetes, spinal stenosis and previous tobacco abuse. Patient is here for follow-up after recent diagnosis of gastroparesis being trialed on Reglan 3-4 times a day. Patient reports that he is still having abdominal bloating and discomfort in the upper and lower abdomen. Patient reports that he has been avoiding GERD triggers, and is still seeing no improvement in his gas buildup symptoms. Patient's last EGD and colonoscopy were in 2021.  EGD revealing mild gastritis.  Colonoscopy was normal except for left-sided diverticulosis.  He was recommended a 10-year recall.       Review of Systems:    CONSTITUTIONAL: Denies any fever, chills, or rigors. Good appetite, and no recent weight loss. HEENT: No earache or tinnitus. Denies hearing loss or visual disturbances. CARDIOVASCULAR: No chest pain or palpitations. RESPIRATORY: Denies any cough, hemoptysis, shortness of breath or dyspnea on exertion. GASTROINTESTINAL: As noted in the History of Present Illness. GENITOURINARY: No problems with urination. Denies any hematuria or dysuria. NEUROLOGIC: No dizziness or vertigo, denies headaches. MUSCULOSKELETAL: Denies any muscle or joint pain. SKIN: Denies skin rashes or itching. ENDOCRINE: Denies excessive thirst. Denies intolerance to heat or cold. PSYCHOSOCIAL: Denies depression or anxiety. Denies any recent memory loss.        Current Outpatient Medications   Medication Sig Dispense Refill   • AJOVY 225 MG/1.5ML SOSY Inject 225 mg under the skin every 30 (thirty) days     • amLODIPine (NORVASC) 10 mg tablet      • celecoxib (CeleBREX) 200 mg capsule Take 200 mg by mouth daily     • Diclofenac Sodium 1.6 % GEL Place on the skin as needed     • dicyclomine (BENTYL) 20 mg tablet Take 1 tablet (20 mg total) by mouth every 6 (six) hours as needed (abdominal pain) 360 tablet 1   • FIBER ADULT GUMMIES PO Take by mouth     • Fluticasone Propionate (Xhance) 93 MCG/ACT EXHU      • gabapentin (NEURONTIN) 600 MG tablet TAKE 2 TABLETS BY MOUTH 3  TIMES DAILY 540 tablet 3   • levocetirizine (XYZAL) 5 MG tablet levocetirizine 5 mg tablet   Take 1 tablet (5mg) by mouth Once Daily. • lidocaine (LIDODERM) 5 % Apply up to 3 patches q.day for 12 hours and then remove for 12 to affected area 90 patch 3   • loratadine-pseudoephedrine (CLARITIN-D 24-HOUR)  mg per 24 hr tablet Take 1 tablet by mouth daily     • losartan (COZAAR) 100 MG tablet Take 100 mg by mouth daily      • metaxalone (SKELAXIN) 800 mg tablet Take 800 mg by mouth 3 (three) times a day     • metoclopramide (REGLAN) 5 mg tablet Take 1 tablet (5 mg total) by mouth 4 (four) times a day 124 tablet 3   • metoprolol succinate (TOPROL-XL) 25 mg 24 hr tablet Take 25 mg by mouth daily     • montelukast (SINGULAIR) 10 mg tablet      • multivitamin (THERAGRAN) TABS Take 1 tablet by mouth daily     • omeprazole (PriLOSEC) 40 MG capsule TAKE 1 CAPSULE (40 MG TOTAL) BY MOUTH DAILY. 90 capsule 0   • ondansetron (ZOFRAN-ODT) 4 mg disintegrating tablet Take 1 tablet (4 mg total) by mouth every 6 (six) hours as needed for nausea or vomiting 20 tablet 0   • rimegepant sulfate (Nurtec) 75 mg TBDP Nurtec ODT 75 mg disintegrating tablet   1 tablet PO daily PRN breakthrough migraine. Do not exceed 1 tablet in 24 hour period. • rizatriptan (MAXALT) 5 mg tablet rizatriptan 5 mg tablet   1 tablet PO at onset of severe migraine. May repeat dose after 2 hours if migraine remains. Maximum daily dose 30mg.      • rosuvastatin (CRESTOR) 5 mg tablet Take 1 tablet by mouth daily     • solifenacin (VESICARE) 5 mg tablet Take 5 mg by mouth daily     • traMADol (ULTRAM-ER) 300 MG 24 hr tablet Take 300 mg by mouth daily     • amLODIPine (NORVASC) 5 mg tablet (Patient not taking: Reported on 4/14/2023)     • amoxicillin-clavulanate (AUGMENTIN) 875-125 mg per tablet TAKE 1 TABLET BY MOUTH EVERY 12 HOURS FOR 7 DAYS     • famotidine (PEPCID) 20 mg tablet famotidine 20 mg tablet   Take 1 tablet (20mg) by mouth Twice Daily. (Patient not taking: Reported on 4/14/2023)     • hydrochlorothiazide (HYDRODIURIL) 25 mg tablet Take 25 mg by mouth daily     • predniSONE 20 mg tablet prednisone 20 mg tablet   TAKE 1 TABLET (20 MG TOTAL) BY MOUTH DAILY FOR 5 DAYS. • predniSONE 20 mg tablet TAKE 1 TABLET (20 MG TOTAL) BY MOUTH DAILY FOR 5 DAYS. • SUMAtriptan (IMITREX) 25 mg tablet sumatriptan 25 mg tablet   TAKE 1 TAB AT ONSET OF MIGRAINE. MAY REPEAT DOSE AFTER 2 HOURS IF NEEDED. MAXIMUM DAILY DOSE 200MG       No current facility-administered medications for this visit.      Past Medical History:   Diagnosis Date   • GERD (gastroesophageal reflux disease)    • Hiatal hernia    • Hyperlipidemia    • Hypertension      Past Surgical History:   Procedure Laterality Date   • ANKLE LIGAMENT RECONSTRUCTION Right 2008   • ARTHROSCOPY KNEE Left 05/06/2019    x2 prior tear 2018   • KNEE SURGERY Right    • ROTATOR CUFF REPAIR Left 2017    4 anchors   • SHOULDER SURGERY Left 05/25/2021   • ULNAR NERVE TRANSPOSITION Bilateral 2010   • WRIST FUSION Right 12/06/2019   • WRIST SURGERY Right 2017/18     Social History     Socioeconomic History   • Marital status: /Civil Union     Spouse name: None   • Number of children: None   • Years of education: None   • Highest education level: None   Occupational History   • None   Tobacco Use   • Smoking status: Never   • Smokeless tobacco: Never   Vaping Use   • Vaping Use: Never used   Substance and Sexual Activity   • Alcohol use: Not Currently     Alcohol/week: 2.0 standard drinks of alcohol     Types: 2 Cans of beer per week     Comment: as per scripts   • Drug use: Never   • Sexual activity: Yes     Partners: Female   Other Topics Concern   • None   Social History Narrative   • None     Social Determinants of Health     Financial Resource Strain: Not on file   Food Insecurity: Not on file   Transportation Needs: Not on file   Physical Activity: Not on file   Stress: Not on file   Social Connections: Not on file   Intimate Partner Violence: Not on file   Housing Stability: Not on file     Family History   Problem Relation Age of Onset   • Hypertension Mother    • Hypertension Father    • Kidney cancer Father             PHYSICAL EXAM:    Vitals:    06/19/23 1529   BP: 140/82   BP Location: Left arm   Patient Position: Sitting   Pulse: 80   Weight: 94.3 kg (208 lb)   Height: 6' (1.829 m)     General Appearance:   Alert and oriented x 3. Cooperative, and in no respiratory distress   HEENT:   Normocephalic, atraumatic, anicteric.     Neck:  Supple, symmetrical, trachea midline   Lungs:   Clear to auscultation bilaterally    Heart[de-identified]   Regular rate and rhythm   Abdomen:   Soft, non-tender, non-distended; normal bowel sounds; no masses, no organomegaly    Genitalia:   Deferred    Rectal:   Deferred    Extremities:  No cyanosis, clubbing or edema    Pulses:  2+ and symmetric all extremities    Skin:  Skin color, texture, turgor normal, no rashes or lesions    Lymph nodes:  No palpable cervical or supraclavicular lymphadenopathy        Lab Results:             Invalid input(s): "LABALBU"            Imaging Studies: I have personally reviewed pertinent imaging studies. MRI brain wo contrast    Result Date: 4/16/2023  Impression: No acute infarct seen No mass effect or midline shift seen Workstation performed: [id number]       ASSESSMENT and PLAN:      1) Gastroparesis - Patient has been on Reglan consistently for at least 2 months now. He is not seeing much benefit, reports that he is no better or no worse on Reglan. He does not feel comfortable domperidone.  - We discussed trial of Botox to the pylorus.   Patient would like to move forward with scheduling.  - First, we will trial Xifaxan as he may also have an element of SIBO  - Otherwise, we will also increase his omeprazole to twice daily.   30 minutes before breakfast and 30 minutes before dinner      Follow up after EGD with Botox

## 2023-06-20 ENCOUNTER — TELEPHONE (OUTPATIENT)
Dept: GASTROENTEROLOGY | Facility: CLINIC | Age: 58
End: 2023-06-20

## 2023-06-20 NOTE — TELEPHONE ENCOUNTER
----- Message from Jose Sandhu PA-C sent at 6/19/2023  3:39 PM EDT -----  Please get Marjorie Curling for him!!

## 2023-06-20 NOTE — TELEPHONE ENCOUNTER
PA was sent through covermymeds for Xifaxan 550 mg  [facility]  Rx #: [id number]  Awaiting insurance determination      Medication was approved until 7/04/2023

## 2023-06-20 NOTE — TELEPHONE ENCOUNTER
Called and LMOM that his Xifaxan was approved through his insurance  To give the pharmacy sometime today to fill the script  Any questions to call the office

## 2023-06-22 DIAGNOSIS — K31.84 GASTROPARESIS: ICD-10-CM

## 2023-06-22 RX ORDER — ONDANSETRON 4 MG/1
4 TABLET, ORALLY DISINTEGRATING ORAL EVERY 6 HOURS PRN
Qty: 20 TABLET | Refills: 0 | Status: SHIPPED | OUTPATIENT
Start: 2023-06-22

## 2023-07-12 ENCOUNTER — ANESTHESIA EVENT (OUTPATIENT)
Dept: GASTROENTEROLOGY | Facility: HOSPITAL | Age: 58
End: 2023-07-12

## 2023-07-12 ENCOUNTER — ANESTHESIA (OUTPATIENT)
Dept: GASTROENTEROLOGY | Facility: HOSPITAL | Age: 58
End: 2023-07-12

## 2023-07-12 ENCOUNTER — HOSPITAL ENCOUNTER (OUTPATIENT)
Dept: GASTROENTEROLOGY | Facility: HOSPITAL | Age: 58
Setting detail: OUTPATIENT SURGERY
Discharge: HOME | End: 2023-07-12
Admitting: INTERNAL MEDICINE
Payer: COMMERCIAL

## 2023-07-12 VITALS
SYSTOLIC BLOOD PRESSURE: 121 MMHG | OXYGEN SATURATION: 95 % | BODY MASS INDEX: 27.58 KG/M2 | TEMPERATURE: 97.7 F | RESPIRATION RATE: 17 BRPM | HEIGHT: 73 IN | WEIGHT: 208.11 LBS | DIASTOLIC BLOOD PRESSURE: 79 MMHG | HEART RATE: 74 BPM

## 2023-07-12 DIAGNOSIS — K31.84 GASTROPARESIS: ICD-10-CM

## 2023-07-12 PROCEDURE — 43236 UPPR GI SCOPE W/SUBMUC INJ: CPT | Performed by: INTERNAL MEDICINE

## 2023-07-12 RX ORDER — PROPOFOL 10 MG/ML
INJECTION, EMULSION INTRAVENOUS AS NEEDED
Status: DISCONTINUED | OUTPATIENT
Start: 2023-07-12 | End: 2023-07-12

## 2023-07-12 RX ORDER — SODIUM CHLORIDE, SODIUM LACTATE, POTASSIUM CHLORIDE, CALCIUM CHLORIDE 600; 310; 30; 20 MG/100ML; MG/100ML; MG/100ML; MG/100ML
INJECTION, SOLUTION INTRAVENOUS CONTINUOUS PRN
Status: DISCONTINUED | OUTPATIENT
Start: 2023-07-12 | End: 2023-07-12

## 2023-07-12 RX ORDER — LIDOCAINE HYDROCHLORIDE 20 MG/ML
INJECTION, SOLUTION EPIDURAL; INFILTRATION; INTRACAUDAL; PERINEURAL AS NEEDED
Status: DISCONTINUED | OUTPATIENT
Start: 2023-07-12 | End: 2023-07-12

## 2023-07-12 RX ADMIN — PROPOFOL 140 MG: 10 INJECTION, EMULSION INTRAVENOUS at 10:59

## 2023-07-12 RX ADMIN — SODIUM CHLORIDE, SODIUM LACTATE, POTASSIUM CHLORIDE, AND CALCIUM CHLORIDE: .6; .31; .03; .02 INJECTION, SOLUTION INTRAVENOUS at 10:40

## 2023-07-12 RX ADMIN — ONABOTULINUMTOXINA 100 UNITS: 100 INJECTION, POWDER, LYOPHILIZED, FOR SOLUTION INTRADERMAL; INTRAMUSCULAR at 11:03

## 2023-07-12 RX ADMIN — PROPOFOL 20 MG: 10 INJECTION, EMULSION INTRAVENOUS at 11:01

## 2023-07-12 RX ADMIN — LIDOCAINE HYDROCHLORIDE 40 MG: 20 INJECTION, SOLUTION EPIDURAL; INFILTRATION; INTRACAUDAL; PERINEURAL at 10:59

## 2023-07-12 RX ADMIN — LIDOCAINE HYDROCHLORIDE 60 MG: 20 INJECTION, SOLUTION EPIDURAL; INFILTRATION; INTRACAUDAL; PERINEURAL at 10:55

## 2023-07-12 NOTE — ANESTHESIA PREPROCEDURE EVALUATION
Procedure:  EGD    Relevant Problems   ANESTHESIA (within normal limits)   (-) History of anesthesia complications      CARDIO   (+) HTN (hypertension)   (+) Hyperlipidemia   (+) Migraine      ENDO (within normal limits)      GI/HEPATIC  Confirmed NPO appropriate   (+) Gastroesophageal reflux disease   (+) Hiatal hernia      /RENAL (within normal limits)      HEMATOLOGY (within normal limits)      MUSCULOSKELETAL   (+) Hiatal hernia      NEURO/PSYCH   (+) Migraine      PULMONARY   (+) Asthma   (-) Smoking   (-) URI (upper respiratory infection)        Physical Exam    Airway    Mallampati score: II  TM Distance: >3 FB  Neck ROM: full     Dental       Cardiovascular  Rhythm: regular, Rate: normal,     Pulmonary  Breath sounds clear to auscultation,     Other Findings        Anesthesia Plan  ASA Score- 2     Anesthesia Type- IV sedation with anesthesia with ASA Monitors. Additional Monitors:   Airway Plan:     Comment: I discussed the risks and benefits of IV sedation anesthesia including the possibility of the need to convert to general anesthesia and the potential risk of awareness. The patient was given the opportunity to ask questions, which were answered. .       Plan Factors-Exercise tolerance (METS): >4 METS. Chart reviewed. Patient is not a current smoker. Induction- intravenous. Postoperative Plan-     Informed Consent- Anesthetic plan and risks discussed with patient. I personally reviewed this patient with the CRNA. Discussed and agreed on the Anesthesia Plan with the CRNA. Moira Brooms

## 2023-07-12 NOTE — ANESTHESIA POSTPROCEDURE EVALUATION
Post-Op Assessment Note    CV Status:  Stable  Pain Score: 0    Pain management: adequate     Mental Status:  Arousable and sleepy   Hydration Status:  Euvolemic   PONV Controlled:  Controlled   Airway Patency:  Patent      Post Op Vitals Reviewed: Yes      Staff: CRNA         No notable events documented.     BP   156/100   Temp      Pulse 90   Resp 18   SpO2 98% RA

## 2023-07-12 NOTE — INTERVAL H&P NOTE
H&P reviewed. After examining the patient I find no changes in the patients condition since the H&P had been written.     Vitals:    07/12/23 1023   BP: 128/80   Pulse: 80   Resp: 16   SpO2: 97%

## 2023-08-09 ENCOUNTER — HOSPITAL ENCOUNTER (EMERGENCY)
Facility: HOSPITAL | Age: 58
Discharge: HOME | End: 2023-08-09
Attending: EMERGENCY MEDICINE
Payer: COMMERCIAL

## 2023-08-09 ENCOUNTER — APPOINTMENT (EMERGENCY)
Dept: CT IMAGING | Facility: HOSPITAL | Age: 58
End: 2023-08-09
Payer: COMMERCIAL

## 2023-08-09 ENCOUNTER — TELEPHONE (OUTPATIENT)
Dept: GASTROENTEROLOGY | Facility: CLINIC | Age: 58
End: 2023-08-09

## 2023-08-09 VITALS
DIASTOLIC BLOOD PRESSURE: 90 MMHG | HEART RATE: 86 BPM | TEMPERATURE: 98 F | SYSTOLIC BLOOD PRESSURE: 160 MMHG | OXYGEN SATURATION: 99 % | RESPIRATION RATE: 17 BRPM

## 2023-08-09 DIAGNOSIS — N40.0 ENLARGED PROSTATE: ICD-10-CM

## 2023-08-09 DIAGNOSIS — N20.0 KIDNEY STONE ON LEFT SIDE: Primary | ICD-10-CM

## 2023-08-09 LAB
ALBUMIN SERPL BCP-MCNC: 4.1 G/DL (ref 3.5–5)
ALP SERPL-CCNC: 81 U/L (ref 34–104)
ALT SERPL W P-5'-P-CCNC: 21 U/L (ref 7–52)
ANION GAP SERPL CALCULATED.3IONS-SCNC: 6 MMOL/L
AST SERPL W P-5'-P-CCNC: 18 U/L (ref 13–39)
BACTERIA UR QL AUTO: ABNORMAL /HPF
BASOPHILS # BLD AUTO: 0.03 THOUSANDS/ÂΜL (ref 0–0.1)
BASOPHILS NFR BLD AUTO: 0 % (ref 0–1)
BILIRUB SERPL-MCNC: 0.43 MG/DL (ref 0.2–1)
BILIRUB UR QL STRIP: NEGATIVE
BUN SERPL-MCNC: 16 MG/DL (ref 5–25)
CALCIUM SERPL-MCNC: 8.8 MG/DL (ref 8.4–10.2)
CHLORIDE SERPL-SCNC: 105 MMOL/L (ref 96–108)
CLARITY UR: CLEAR
CO2 SERPL-SCNC: 27 MMOL/L (ref 21–32)
COLOR UR: ABNORMAL
CREAT SERPL-MCNC: 1.19 MG/DL (ref 0.6–1.3)
EOSINOPHIL # BLD AUTO: 0.09 THOUSAND/ÂΜL (ref 0–0.61)
EOSINOPHIL NFR BLD AUTO: 1 % (ref 0–6)
ERYTHROCYTE [DISTWIDTH] IN BLOOD BY AUTOMATED COUNT: 12.2 % (ref 11.6–15.1)
GFR SERPL CREATININE-BSD FRML MDRD: 67 ML/MIN/1.73SQ M
GLUCOSE SERPL-MCNC: 107 MG/DL (ref 65–140)
GLUCOSE UR STRIP-MCNC: NEGATIVE MG/DL
HCT VFR BLD AUTO: 42.9 % (ref 36.5–49.3)
HGB BLD-MCNC: 14.7 G/DL (ref 12–17)
HGB UR QL STRIP.AUTO: ABNORMAL
HYALINE CASTS #/AREA URNS LPF: ABNORMAL /LPF
IMM GRANULOCYTES # BLD AUTO: 0.01 THOUSAND/UL (ref 0–0.2)
IMM GRANULOCYTES NFR BLD AUTO: 0 % (ref 0–2)
KETONES UR STRIP-MCNC: NEGATIVE MG/DL
LEUKOCYTE ESTERASE UR QL STRIP: NEGATIVE
LIPASE SERPL-CCNC: 15 U/L (ref 11–82)
LYMPHOCYTES # BLD AUTO: 2.16 THOUSANDS/ÂΜL (ref 0.6–4.47)
LYMPHOCYTES NFR BLD AUTO: 22 % (ref 14–44)
MCH RBC QN AUTO: 31.6 PG (ref 26.8–34.3)
MCHC RBC AUTO-ENTMCNC: 34.3 G/DL (ref 31.4–37.4)
MCV RBC AUTO: 92 FL (ref 82–98)
MONOCYTES # BLD AUTO: 1.04 THOUSAND/ÂΜL (ref 0.17–1.22)
MONOCYTES NFR BLD AUTO: 10 % (ref 4–12)
MUCOUS THREADS UR QL AUTO: ABNORMAL
NEUTROPHILS # BLD AUTO: 6.69 THOUSANDS/ÂΜL (ref 1.85–7.62)
NEUTS SEG NFR BLD AUTO: 67 % (ref 43–75)
NITRITE UR QL STRIP: NEGATIVE
NON-SQ EPI CELLS URNS QL MICRO: ABNORMAL /HPF
NRBC BLD AUTO-RTO: 0 /100 WBCS
PH UR STRIP.AUTO: 6 [PH]
PLATELET # BLD AUTO: 267 THOUSANDS/UL (ref 149–390)
PMV BLD AUTO: 10.3 FL (ref 8.9–12.7)
POTASSIUM SERPL-SCNC: 3.9 MMOL/L (ref 3.5–5.3)
PROT SERPL-MCNC: 6.7 G/DL (ref 6.4–8.4)
PROT UR STRIP-MCNC: NEGATIVE MG/DL
RBC # BLD AUTO: 4.65 MILLION/UL (ref 3.88–5.62)
RBC #/AREA URNS AUTO: ABNORMAL /HPF
SODIUM SERPL-SCNC: 138 MMOL/L (ref 135–147)
SP GR UR STRIP.AUTO: 1.01 (ref 1–1.03)
UROBILINOGEN UR STRIP-ACNC: <2 MG/DL
WBC # BLD AUTO: 10.02 THOUSAND/UL (ref 4.31–10.16)
WBC #/AREA URNS AUTO: ABNORMAL /HPF

## 2023-08-09 PROCEDURE — 74176 CT ABD & PELVIS W/O CONTRAST: CPT

## 2023-08-09 PROCEDURE — 36415 COLL VENOUS BLD VENIPUNCTURE: CPT | Performed by: EMERGENCY MEDICINE

## 2023-08-09 PROCEDURE — G1004 CDSM NDSC: HCPCS

## 2023-08-09 PROCEDURE — 81001 URINALYSIS AUTO W/SCOPE: CPT | Performed by: EMERGENCY MEDICINE

## 2023-08-09 PROCEDURE — 85025 COMPLETE CBC W/AUTO DIFF WBC: CPT | Performed by: EMERGENCY MEDICINE

## 2023-08-09 PROCEDURE — 83690 ASSAY OF LIPASE: CPT | Performed by: EMERGENCY MEDICINE

## 2023-08-09 PROCEDURE — 80053 COMPREHEN METABOLIC PANEL: CPT | Performed by: EMERGENCY MEDICINE

## 2023-08-09 RX ORDER — ONDANSETRON 2 MG/ML
4 INJECTION INTRAMUSCULAR; INTRAVENOUS ONCE
Status: COMPLETED | OUTPATIENT
Start: 2023-08-09 | End: 2023-08-09

## 2023-08-09 RX ORDER — IBUPROFEN 800 MG/1
800 TABLET ORAL EVERY 8 HOURS PRN
Qty: 30 TABLET | Refills: 0 | Status: SHIPPED | OUTPATIENT
Start: 2023-08-09

## 2023-08-09 RX ORDER — KETOROLAC TROMETHAMINE 30 MG/ML
15 INJECTION, SOLUTION INTRAMUSCULAR; INTRAVENOUS ONCE
Status: COMPLETED | OUTPATIENT
Start: 2023-08-09 | End: 2023-08-09

## 2023-08-09 RX ORDER — OXYCODONE HYDROCHLORIDE AND ACETAMINOPHEN 5; 325 MG/1; MG/1
1 TABLET ORAL EVERY 6 HOURS PRN
Qty: 28 TABLET | Refills: 0 | Status: SHIPPED | OUTPATIENT
Start: 2023-08-09

## 2023-08-09 RX ORDER — MORPHINE SULFATE 4 MG/ML
4 INJECTION, SOLUTION INTRAMUSCULAR; INTRAVENOUS ONCE
Status: COMPLETED | OUTPATIENT
Start: 2023-08-09 | End: 2023-08-09

## 2023-08-09 RX ORDER — TAMSULOSIN HYDROCHLORIDE 0.4 MG/1
0.4 CAPSULE ORAL
Qty: 20 CAPSULE | Refills: 0 | Status: SHIPPED | OUTPATIENT
Start: 2023-08-09

## 2023-08-09 RX ADMIN — MORPHINE SULFATE 4 MG: 4 INJECTION INTRAVENOUS at 05:01

## 2023-08-09 RX ADMIN — SODIUM CHLORIDE 1000 ML: 0.9 INJECTION, SOLUTION INTRAVENOUS at 04:56

## 2023-08-09 RX ADMIN — ONDANSETRON 4 MG: 2 INJECTION INTRAMUSCULAR; INTRAVENOUS at 04:58

## 2023-08-09 RX ADMIN — KETOROLAC TROMETHAMINE 15 MG: 30 INJECTION, SOLUTION INTRAMUSCULAR; INTRAVENOUS at 04:59

## 2023-08-09 NOTE — ED PROVIDER NOTES
History  Chief Complaint   Patient presents with   • Flank Pain     L sided flank pain radiating around to groin      Patient is a 63years old male with a past medical history of chronic pain disorder, GERD, hyperlipidemia, hypertension and hiatal hernia who presented to the ED today for evaluation of a left flank pain that began around 7 PM yesterday. Patient admits pain to be a constant aching pain that radiates into his groin with no relieving or exacerbating factors. Admits associated nausea but no vomiting. Patient admits had similar complaint 5 days ago that lasted for about an hour prior to resolving. Admits unable to get comfortable due to the pain and presents to the ED for evaluation. Patient otherwise denies any hematuria, chest pain, shortness of breath, trauma, syncopal episode, fever, chills or any other complaint on review of systems. Prior to Admission Medications   Prescriptions Last Dose Informant Patient Reported? Taking?    AJOVY 225 MG/1.5ML SOSY  Self Yes No   Sig: Inject 225 mg under the skin every 30 (thirty) days   Diclofenac Sodium 1.6 % GEL  Self Yes No   Sig: Place on the skin as needed   FIBER ADULT GUMMIES PO  Self Yes No   Sig: Take by mouth   Fluticasone Propionate (Xhance) 93 MCG/ACT EXHU  Self Yes No   amLODIPine (NORVASC) 10 mg tablet   Yes No   celecoxib (CeleBREX) 200 mg capsule  Self Yes No   Sig: Take 200 mg by mouth daily   dicyclomine (BENTYL) 20 mg tablet  Self No No   Sig: Take 1 tablet (20 mg total) by mouth every 6 (six) hours as needed (abdominal pain)   gabapentin (NEURONTIN) 600 MG tablet  Self No No   Sig: TAKE 2 TABLETS BY MOUTH 3  TIMES DAILY   hydrochlorothiazide (HYDRODIURIL) 25 mg tablet  Self Yes No   Sig: Take 25 mg by mouth daily   levocetirizine (XYZAL) 5 MG tablet   Yes No   Sig: levocetirizine 5 mg tablet   Take 1 tablet (5mg) by mouth Once Daily.   lidocaine (LIDODERM) 5 %  Self No No   Sig: Apply up to 3 patches q.day for 12 hours and then remove for 12 to affected area   loratadine-pseudoephedrine (CLARITIN-D 24-HOUR)  mg per 24 hr tablet  Self Yes No   Sig: Take 1 tablet by mouth daily   losartan (COZAAR) 100 MG tablet  Self Yes No   Sig: Take 100 mg by mouth daily    metaxalone (SKELAXIN) 800 mg tablet  Self Yes No   Sig: Take 800 mg by mouth 3 (three) times a day   metoclopramide (REGLAN) 5 mg tablet   No No   Sig: Take 1 tablet (5 mg total) by mouth 4 (four) times a day   metoprolol succinate (TOPROL-XL) 25 mg 24 hr tablet  Self Yes No   Sig: Take 25 mg by mouth daily   montelukast (SINGULAIR) 10 mg tablet  Self Yes No   multivitamin (THERAGRAN) TABS  Self Yes No   Sig: Take 1 tablet by mouth daily   omeprazole (PriLOSEC) 40 MG capsule   No No   Sig: Take 1 capsule (40 mg total) by mouth 2 (two) times a day   ondansetron (ZOFRAN-ODT) 4 mg disintegrating tablet   No No   Sig: TAKE 1 TABLET (4 MG TOTAL) BY MOUTH EVERY 6 (SIX) HOURS AS NEEDED FOR NAUSEA OR VOMITING   rimegepant sulfate (Nurtec) 75 mg TBDP   Yes No   Sig: Nurtec ODT 75 mg disintegrating tablet   1 tablet PO daily PRN breakthrough migraine. Do not exceed 1 tablet in 24 hour period. rizatriptan (MAXALT) 5 mg tablet   Yes No   Sig: rizatriptan 5 mg tablet   1 tablet PO at onset of severe migraine. May repeat dose after 2 hours if migraine remains. Maximum daily dose 30mg.    rosuvastatin (CRESTOR) 5 mg tablet  Self Yes No   Sig: Take 1 tablet by mouth daily   solifenacin (VESICARE) 5 mg tablet  Self Yes No   Sig: Take 5 mg by mouth daily   traMADol (ULTRAM-ER) 300 MG 24 hr tablet  Self Yes No   Sig: Take 300 mg by mouth daily      Facility-Administered Medications: None       Past Medical History:   Diagnosis Date   • Arthritis    • Chronic pain disorder    • GERD (gastroesophageal reflux disease)    • Hiatal hernia    • Hyperlipidemia    • Hypertension        Past Surgical History:   Procedure Laterality Date   • ANKLE LIGAMENT RECONSTRUCTION Right 2008   • ARTHROSCOPY KNEE Left 05/06/2019    x2 prior tear 2018   • KNEE ARTHROSCOPY     • KNEE SURGERY Right    • ROTATOR CUFF REPAIR Left 2017    4 anchors   • SHOULDER SURGERY Left 05/25/2021   • ULNAR NERVE TRANSPOSITION Bilateral 2010   • WRIST FUSION Right 12/06/2019   • WRIST SURGERY Right 2017/18       Family History   Problem Relation Age of Onset   • Hypertension Mother    • Hypertension Father    • Kidney cancer Father      I have reviewed and agree with the history as documented. E-Cigarette/Vaping   • E-Cigarette Use Never User      E-Cigarette/Vaping Substances   • Nicotine No    • THC No    • CBD No    • Flavoring No    • Other No    • Unknown No      Social History     Tobacco Use   • Smoking status: Never   • Smokeless tobacco: Never   Vaping Use   • Vaping Use: Never used   Substance Use Topics   • Alcohol use: Not Currently     Alcohol/week: 2.0 standard drinks of alcohol     Types: 2 Cans of beer per week     Comment: as per scripts   • Drug use: Never       Review of Systems   Constitutional: Negative for chills and fever. HENT: Negative for ear pain and sore throat. Eyes: Negative for pain and visual disturbance. Respiratory: Negative for cough, chest tightness and shortness of breath. Cardiovascular: Negative for chest pain and palpitations. Gastrointestinal: Positive for nausea. Negative for abdominal pain, constipation and vomiting. Genitourinary: Positive for flank pain ( Left flank pain). Negative for difficulty urinating, dysuria, hematuria, penile pain, penile swelling and testicular pain. Musculoskeletal: Negative for arthralgias and back pain. Skin: Negative for color change and rash. Neurological: Negative for dizziness, seizures, syncope and headaches. All other systems reviewed and are negative. Physical Exam  Physical Exam  Vitals and nursing note reviewed. Constitutional:       General: He is not in acute distress. Appearance: He is well-developed.  He is not ill-appearing or diaphoretic. HENT:      Head: Normocephalic and atraumatic. Nose: No congestion or rhinorrhea. Eyes:      General: No scleral icterus. Conjunctiva/sclera: Conjunctivae normal.   Cardiovascular:      Rate and Rhythm: Normal rate and regular rhythm. Heart sounds: No murmur heard. Pulmonary:      Effort: Pulmonary effort is normal. No respiratory distress. Breath sounds: Normal breath sounds. No wheezing. Abdominal:      General: Bowel sounds are normal.      Palpations: Abdomen is soft. Tenderness: There is no abdominal tenderness. There is left CVA tenderness. There is no guarding or rebound. Musculoskeletal:         General: No swelling. Cervical back: Neck supple. Right lower leg: No edema. Left lower leg: No edema. Skin:     General: Skin is warm and dry. Capillary Refill: Capillary refill takes less than 2 seconds. Neurological:      Mental Status: He is alert.    Psychiatric:         Mood and Affect: Mood normal.         Vital Signs  ED Triage Vitals   Temperature Pulse Respirations Blood Pressure SpO2   08/09/23 0443 08/09/23 0443 08/09/23 0443 08/09/23 0443 08/09/23 0443   98 °F (36.7 °C) 102 18 162/99 99 %      Temp src Heart Rate Source Patient Position - Orthostatic VS BP Location FiO2 (%)   -- -- -- -- --             Pain Score       08/09/23 0459       8           Vitals:    08/09/23 0443   BP: 162/99   Pulse: 102         Visual Acuity      ED Medications  Medications   sodium chloride 0.9 % bolus 1,000 mL (1,000 mL Intravenous New Bag 8/9/23 0456)   ketorolac (TORADOL) injection 15 mg (15 mg Intravenous Given 8/9/23 0459)   ondansetron (ZOFRAN) injection 4 mg (4 mg Intravenous Given 8/9/23 4578)   morphine injection 4 mg (4 mg Intravenous Given 8/9/23 2231)       Diagnostic Studies  Results Reviewed     Procedure Component Value Units Date/Time    Comprehensive metabolic panel [[id number]] Collected: 08/09/23 0521    Lab Status: Final result Specimen: Blood from Arm, Left Updated: 08/09/23 0547     Sodium 138 mmol/L      Potassium 3.9 mmol/L      Chloride 105 mmol/L      CO2 27 mmol/L      ANION GAP 6 mmol/L      BUN 16 mg/dL      Creatinine 1.19 mg/dL      Glucose 107 mg/dL      Calcium 8.8 mg/dL      AST 18 U/L      ALT 21 U/L      Alkaline Phosphatase 81 U/L      Total Protein 6.7 g/dL      Albumin 4.1 g/dL      Total Bilirubin 0.43 mg/dL      eGFR 67 ml/min/1.73sq m     Narrative:      [place] guidelines for Chronic Kidney Disease (CKD):   •  Stage 1 with normal or high GFR (GFR > 90 mL/min/1.73 square meters)  •  Stage 2 Mild CKD (GFR = 60-89 mL/min/1.73 square meters)  •  Stage 3A Moderate CKD (GFR = 45-59 mL/min/1.73 square meters)  •  Stage 3B Moderate CKD (GFR = 30-44 mL/min/1.73 square meters)  •  Stage 4 Severe CKD (GFR = 15-29 mL/min/1.73 square meters)  •  Stage 5 End Stage CKD (GFR <15 mL/min/1.73 square meters)  Note: GFR calculation is accurate only with a steady state creatinine    Lipase [[id number]]  (Normal) Collected: 08/09/23 0521    Lab Status: Final result Specimen: Blood from Arm, Left Updated: 08/09/23 0547     Lipase 15 u/L     Urine Microscopic [[id number]]  (Abnormal) Collected: 08/09/23 0450    Lab Status: Final result Specimen: Urine, Clean Catch Updated: 08/09/23 0503     RBC, UA Innumerable /hpf      WBC, UA 1-2 /hpf      Epithelial Cells None Seen /hpf      Bacteria, UA None Seen /hpf      MUCUS THREADS Occasional     Hyaline Casts, UA 3-5 /lpf     UA w Reflex to Microscopic w Reflex to Culture [[id number]]  (Abnormal) Collected: 08/09/23 0450    Lab Status: Final result Specimen: Urine, Clean Catch Updated: 08/09/23 0502     Color, UA Light Yellow     Clarity, UA Clear     Specific Gravity, UA 1.015     pH, UA 6.0     Leukocytes, UA Negative     Nitrite, UA Negative     Protein, UA Negative mg/dl      Glucose, UA Negative mg/dl      Ketones, UA Negative mg/dl      Urobilinogen, UA <2.0 mg/dl Bilirubin, UA Negative     Occult Blood, UA Large    CBC and differential [[id number]] Collected: 08/09/23 0451    Lab Status: Final result Specimen: Blood from Arm, Left Updated: 08/09/23 0500     WBC 10.02 Thousand/uL      RBC 4.65 Million/uL      Hemoglobin 14.7 g/dL      Hematocrit 42.9 %      MCV 92 fL      MCH 31.6 pg      MCHC 34.3 g/dL      RDW 12.2 %      MPV 10.3 fL      Platelets 743 Thousands/uL      nRBC 0 /100 WBCs      Neutrophils Relative 67 %      Immat GRANS % 0 %      Lymphocytes Relative 22 %      Monocytes Relative 10 %      Eosinophils Relative 1 %      Basophils Relative 0 %      Neutrophils Absolute 6.69 Thousands/µL      Immature Grans Absolute 0.01 Thousand/uL      Lymphocytes Absolute 2.16 Thousands/µL      Monocytes Absolute 1.04 Thousand/µL      Eosinophils Absolute 0.09 Thousand/µL      Basophils Absolute 0.03 Thousands/µL                  CT abdomen pelvis wo contrast   Final Result by Mandi Purcell MD (08/09 0535)      Mildly obstructing 3 mm calculus in the left ureterovesical junction. Prostatomegaly. Workstation performed: [id number]                    Procedures  Procedures         ED Course  ED Course as of 08/09/23 0610   Wed Aug 09, 2023   0544 CT abdomen pelvis wo contrast  IMPRESSION:     Mildly obstructing 3 mm calculus in the left ureterovesical junction.     Prostatomegaly. 3099 Patient reevaluated and admits improvement of his left flank pain. SBIRT 22yo+    Flowsheet Row Most Recent Value   Initial Alcohol Screen: US AUDIT-C     1. How often do you have a drink containing alcohol? 0 Filed at: 08/09/2023 0443   2. How many drinks containing alcohol do you have on a typical day you are drinking? 0 Filed at: 08/09/2023 0443   3a. Male UNDER 65: How often do you have five or more drinks on one occasion? 0 Filed at: 08/09/2023 0443   3b. FEMALE Any Age, or MALE 65+: How often do you have 4 or more drinks on one occassion?  0 Filed at: 08/09/2023 0443   Audit-C Score 0 Filed at: 08/09/2023 0443   CLEMENTINE: How many times in the past year have you. .. Used an illegal drug or used a prescription medication for non-medical reasons? Never Filed at: 08/09/2023 0443                    Medical Decision Making  Patient's history and exam finding concerning for kidney stone versus muscle strain. Kidney stone preferred due to the positive CVA tenderness on the left side. Patient was treated with 4 mg of morphine, 4 mg of Zofran, 15 mg of Toradol and 1000 mL of normal saline pending diagnostic workup. Patient reevaluated with improvement of his pain. Diagnostic workup came back with urinalysis showing hematuria with a CT scan consistent with a 3 mm calculus within the UVJ junction in addition with prostatomegaly. Patient was informed of his CAT scan findings and was discharged home with instructions to follow-up with urologist and to return immediately to the emergency room for any new or worsening of symptoms. Patient stable upon discharge. Amount and/or Complexity of Data Reviewed  Labs: ordered. Decision-making details documented in ED Course. Radiology: ordered. Decision-making details documented in ED Course. Risk  Prescription drug management. Disposition  Final diagnoses:   Kidney stone on left side   Enlarged prostate     Time reflects when diagnosis was documented in both MDM as applicable and the Disposition within this note     Time User Action Codes Description Comment    8/9/2023  6:03 AM Kayla Norton Add [N20.0] Kidney stone on left side     8/9/2023  6:03 AM Kayla Norton Add [N40.0] Enlarged prostate       ED Disposition     ED Disposition   Discharge    Condition   Stable    Date/Time   Wed Aug 9, 2023  6:02 AM    Comment   Em Reed [facility] discharge to home/self care.                Follow-up Information     Follow up With Specialties Details Why Contact Info Additional Information    Alphonso Mckeon MD Internal Medicine Call in 1 day  [address] [facility] [place] ([place]) Urology Call in 1 day  [address] [facility] [phone] [id number] [facility] [place] ([place]), [address] [place] [address], [address]          Patient's Medications   Discharge Prescriptions    IBUPROFEN (MOTRIN) 800 MG TABLET    Take 1 tablet (800 mg total) by mouth every 8 (eight) hours as needed for mild pain       Start Date: 8/9/2023  End Date: --       Order Dose: 800 mg       Quantity: 30 tablet    Refills: 0    OXYCODONE-ACETAMINOPHEN (PERCOCET) 5-325 MG PER TABLET    Take 1 tablet by mouth every 6 (six) hours as needed for moderate pain Max Daily Amount: 4 tablets       Start Date: 8/9/2023  End Date: --       Order Dose: 1 tablet       Quantity: 28 tablet    Refills: 0    TAMSULOSIN (FLOMAX) 0.4 MG    Take 1 capsule (0.4 mg total) by mouth daily with dinner       Start Date: 8/9/2023  End Date: --       Order Dose: 0.4 mg       Quantity: 20 capsule    Refills: 0           PDMP Review     None          ED Provider  Electronically Signed by           Re Mcgregor PA-C  08/09/23 5846

## 2023-08-09 NOTE — DISCHARGE INSTRUCTIONS
Please take prescribed medications as instructed. Call for follow-up with the urologist within the next available appointment. Please strain your urine as instructed. Return to the ED for any new or worsening of symptoms.

## 2023-08-16 ENCOUNTER — HOSPITAL ENCOUNTER (EMERGENCY)
Facility: HOSPITAL | Age: 58
Discharge: HOME | End: 2023-08-16
Attending: EMERGENCY MEDICINE
Payer: COMMERCIAL

## 2023-08-16 ENCOUNTER — APPOINTMENT (OUTPATIENT)
Dept: RADIOLOGY | Facility: HOSPITAL | Age: 58
End: 2023-08-16
Payer: COMMERCIAL

## 2023-08-16 VITALS
RESPIRATION RATE: 14 BRPM | HEIGHT: 72 IN | WEIGHT: 195 LBS | OXYGEN SATURATION: 99 % | BODY MASS INDEX: 26.41 KG/M2 | DIASTOLIC BLOOD PRESSURE: 94 MMHG | TEMPERATURE: 98 F | HEART RATE: 91 BPM | SYSTOLIC BLOOD PRESSURE: 144 MMHG

## 2023-08-16 DIAGNOSIS — I49.3 PVC'S (PREMATURE VENTRICULAR CONTRACTIONS): Primary | ICD-10-CM

## 2023-08-16 LAB
2HR DELTA HS TROPONIN: <-1 NG/L
ALBUMIN SERPL BCP-MCNC: 4.4 G/DL (ref 3.5–5)
ALP SERPL-CCNC: 81 U/L (ref 34–104)
ALT SERPL W P-5'-P-CCNC: 18 U/L (ref 7–52)
ANION GAP SERPL CALCULATED.3IONS-SCNC: 6 MMOL/L
AST SERPL W P-5'-P-CCNC: 18 U/L (ref 13–39)
BASOPHILS # BLD AUTO: 0.02 THOUSANDS/ÂΜL (ref 0–0.1)
BASOPHILS NFR BLD AUTO: 0 % (ref 0–1)
BILIRUB SERPL-MCNC: 0.47 MG/DL (ref 0.2–1)
BNP SERPL-MCNC: 31 PG/ML (ref 0–100)
BUN SERPL-MCNC: 11 MG/DL (ref 5–25)
CALCIUM SERPL-MCNC: 9.7 MG/DL (ref 8.4–10.2)
CARDIAC TROPONIN I PNL SERPL HS: 3 NG/L
CARDIAC TROPONIN I PNL SERPL HS: <2 NG/L
CHLORIDE SERPL-SCNC: 107 MMOL/L (ref 96–108)
CO2 SERPL-SCNC: 26 MMOL/L (ref 21–32)
CREAT SERPL-MCNC: 0.94 MG/DL (ref 0.6–1.3)
EOSINOPHIL # BLD AUTO: 0.06 THOUSAND/ÂΜL (ref 0–0.61)
EOSINOPHIL NFR BLD AUTO: 1 % (ref 0–6)
ERYTHROCYTE [DISTWIDTH] IN BLOOD BY AUTOMATED COUNT: 11.9 % (ref 11.6–15.1)
GFR SERPL CREATININE-BSD FRML MDRD: 89 ML/MIN/1.73SQ M
GLUCOSE SERPL-MCNC: 118 MG/DL (ref 65–140)
HCT VFR BLD AUTO: 44.1 % (ref 36.5–49.3)
HGB BLD-MCNC: 15.6 G/DL (ref 12–17)
IMM GRANULOCYTES # BLD AUTO: 0.02 THOUSAND/UL (ref 0–0.2)
IMM GRANULOCYTES NFR BLD AUTO: 0 % (ref 0–2)
LYMPHOCYTES # BLD AUTO: 1.25 THOUSANDS/ÂΜL (ref 0.6–4.47)
LYMPHOCYTES NFR BLD AUTO: 22 % (ref 14–44)
MCH RBC QN AUTO: 32.4 PG (ref 26.8–34.3)
MCHC RBC AUTO-ENTMCNC: 35.4 G/DL (ref 31.4–37.4)
MCV RBC AUTO: 92 FL (ref 82–98)
MONOCYTES # BLD AUTO: 0.39 THOUSAND/ÂΜL (ref 0.17–1.22)
MONOCYTES NFR BLD AUTO: 7 % (ref 4–12)
NEUTROPHILS # BLD AUTO: 3.97 THOUSANDS/ÂΜL (ref 1.85–7.62)
NEUTS SEG NFR BLD AUTO: 70 % (ref 43–75)
NRBC BLD AUTO-RTO: 0 /100 WBCS
PLATELET # BLD AUTO: 270 THOUSANDS/UL (ref 149–390)
PMV BLD AUTO: 9.7 FL (ref 8.9–12.7)
POTASSIUM SERPL-SCNC: 4.3 MMOL/L (ref 3.5–5.3)
PROT SERPL-MCNC: 7.2 G/DL (ref 6.4–8.4)
RBC # BLD AUTO: 4.81 MILLION/UL (ref 3.88–5.62)
SODIUM SERPL-SCNC: 139 MMOL/L (ref 135–147)
WBC # BLD AUTO: 5.71 THOUSAND/UL (ref 4.31–10.16)

## 2023-08-16 PROCEDURE — 99285 EMERGENCY DEPT VISIT HI MDM: CPT

## 2023-08-16 PROCEDURE — 85025 COMPLETE CBC W/AUTO DIFF WBC: CPT | Performed by: EMERGENCY MEDICINE

## 2023-08-16 PROCEDURE — 93005 ELECTROCARDIOGRAM TRACING: CPT

## 2023-08-16 PROCEDURE — 84484 ASSAY OF TROPONIN QUANT: CPT | Performed by: EMERGENCY MEDICINE

## 2023-08-16 PROCEDURE — 71046 X-RAY EXAM CHEST 2 VIEWS: CPT

## 2023-08-16 PROCEDURE — 80053 COMPREHEN METABOLIC PANEL: CPT | Performed by: EMERGENCY MEDICINE

## 2023-08-16 PROCEDURE — 83880 ASSAY OF NATRIURETIC PEPTIDE: CPT | Performed by: EMERGENCY MEDICINE

## 2023-08-16 PROCEDURE — 36415 COLL VENOUS BLD VENIPUNCTURE: CPT

## 2023-08-16 PROCEDURE — 99284 EMERGENCY DEPT VISIT MOD MDM: CPT | Performed by: EMERGENCY MEDICINE

## 2023-08-17 LAB
ATRIAL RATE: 100 BPM
P AXIS: 53 DEGREES
PR INTERVAL: 154 MS
QRS AXIS: 79 DEGREES
QRSD INTERVAL: 80 MS
QT INTERVAL: 332 MS
QTC INTERVAL: 428 MS
T WAVE AXIS: 35 DEGREES
VENTRICULAR RATE: 100 BPM

## 2023-08-17 PROCEDURE — 93010 ELECTROCARDIOGRAM REPORT: CPT | Performed by: INTERNAL MEDICINE

## 2023-08-17 NOTE — ED PROVIDER NOTES
History  Chief Complaint   Patient presents with   • Palpitations     Pt reports feeling like his heart is skipping a beat over the past few days, increasing in frequency today. Denies cardiac hx     67-year-old male presents emergency department for evaluation of palpitations. Patient feeling his heart skipped a beat over the past few days, it has been increasing in severity. He does no chest pain with this. He sometimes feels short of breath when he has his heart skipping beats. Denies any recent fevers or chills. Denies any lightheadedness syncope or presyncope. Prior to Admission Medications   Prescriptions Last Dose Informant Patient Reported? Taking?    AJOVY 225 MG/1.5ML SOSY  Self Yes No   Sig: Inject 225 mg under the skin every 30 (thirty) days   Diclofenac Sodium 1.6 % GEL  Self Yes No   Sig: Place on the skin as needed   FIBER ADULT GUMMIES PO  Self Yes No   Sig: Take by mouth   Fluticasone Propionate (Xhance) 93 MCG/ACT EXHU  Self Yes No   amLODIPine (NORVASC) 10 mg tablet   Yes No   celecoxib (CeleBREX) 200 mg capsule  Self Yes No   Sig: Take 200 mg by mouth daily   dicyclomine (BENTYL) 20 mg tablet  Self No No   Sig: Take 1 tablet (20 mg total) by mouth every 6 (six) hours as needed (abdominal pain)   gabapentin (NEURONTIN) 600 MG tablet  Self No No   Sig: TAKE 2 TABLETS BY MOUTH 3  TIMES DAILY   hydrochlorothiazide (HYDRODIURIL) 25 mg tablet  Self Yes No   Sig: Take 25 mg by mouth daily   ibuprofen (MOTRIN) 800 mg tablet   No No   Sig: Take 1 tablet (800 mg total) by mouth every 8 (eight) hours as needed for mild pain   levocetirizine (XYZAL) 5 MG tablet   Yes No   Sig: levocetirizine 5 mg tablet   Take 1 tablet (5mg) by mouth Once Daily.   lidocaine (LIDODERM) 5 %  Self No No   Sig: Apply up to 3 patches q.day for 12 hours and then remove for 12 to affected area   loratadine-pseudoephedrine (CLARITIN-D 24-HOUR)  mg per 24 hr tablet  Self Yes No   Sig: Take 1 tablet by mouth daily losartan (COZAAR) 100 MG tablet  Self Yes No   Sig: Take 100 mg by mouth daily    metaxalone (SKELAXIN) 800 mg tablet  Self Yes No   Sig: Take 800 mg by mouth 3 (three) times a day   metoclopramide (REGLAN) 5 mg tablet   No No   Sig: Take 1 tablet (5 mg total) by mouth 4 (four) times a day   metoprolol succinate (TOPROL-XL) 25 mg 24 hr tablet  Self Yes No   Sig: Take 25 mg by mouth daily   montelukast (SINGULAIR) 10 mg tablet  Self Yes No   multivitamin (THERAGRAN) TABS  Self Yes No   Sig: Take 1 tablet by mouth daily   omeprazole (PriLOSEC) 40 MG capsule   No No   Sig: Take 1 capsule (40 mg total) by mouth 2 (two) times a day   ondansetron (ZOFRAN-ODT) 4 mg disintegrating tablet   No No   Sig: TAKE 1 TABLET (4 MG TOTAL) BY MOUTH EVERY 6 (SIX) HOURS AS NEEDED FOR NAUSEA OR VOMITING   oxyCODONE-acetaminophen (Percocet) 5-325 mg per tablet   No No   Sig: Take 1 tablet by mouth every 6 (six) hours as needed for moderate pain Max Daily Amount: 4 tablets   rimegepant sulfate (Nurtec) 75 mg TBDP   Yes No   Sig: Nurtec ODT 75 mg disintegrating tablet   1 tablet PO daily PRN breakthrough migraine. Do not exceed 1 tablet in 24 hour period. rizatriptan (MAXALT) 5 mg tablet   Yes No   Sig: rizatriptan 5 mg tablet   1 tablet PO at onset of severe migraine. May repeat dose after 2 hours if migraine remains. Maximum daily dose 30mg.    rosuvastatin (CRESTOR) 5 mg tablet  Self Yes No   Sig: Take 1 tablet by mouth daily   solifenacin (VESICARE) 5 mg tablet  Self Yes No   Sig: Take 5 mg by mouth daily   tamsulosin (FLOMAX) 0.4 mg   No No   Sig: Take 1 capsule (0.4 mg total) by mouth daily with dinner   traMADol (ULTRAM-ER) 300 MG 24 hr tablet  Self Yes No   Sig: Take 300 mg by mouth daily      Facility-Administered Medications: None       Past Medical History:   Diagnosis Date   • Arthritis    • Chronic pain disorder    • GERD (gastroesophageal reflux disease)    • Hiatal hernia    • Hyperlipidemia    • Hypertension        Past Surgical History:   Procedure Laterality Date   • ANKLE LIGAMENT RECONSTRUCTION Right 2008   • ARTHROSCOPY KNEE Left 05/06/2019    x2 prior tear 2018   • KNEE ARTHROSCOPY     • KNEE SURGERY Right    • ROTATOR CUFF REPAIR Left 2017    4 anchors   • SHOULDER SURGERY Left 05/25/2021   • ULNAR NERVE TRANSPOSITION Bilateral 2010   • WRIST FUSION Right 12/06/2019   • WRIST SURGERY Right 2017/18       Family History   Problem Relation Age of Onset   • Hypertension Mother    • Hypertension Father    • Kidney cancer Father      I have reviewed and agree with the history as documented. E-Cigarette/Vaping   • E-Cigarette Use Never User      E-Cigarette/Vaping Substances   • Nicotine No    • THC No    • CBD No    • Flavoring No    • Other No    • Unknown No      Social History     Tobacco Use   • Smoking status: Never   • Smokeless tobacco: Never   Vaping Use   • Vaping Use: Never used   Substance Use Topics   • Alcohol use: Not Currently     Alcohol/week: 2.0 standard drinks of alcohol     Types: 2 Cans of beer per week     Comment: as per scripts   • Drug use: Never       Review of Systems   Constitutional: Negative for appetite change, chills, fatigue and fever. HENT: Negative for sneezing and sore throat. Eyes: Negative for visual disturbance. Respiratory: Negative for cough, choking, chest tightness, shortness of breath and wheezing. Cardiovascular: Positive for palpitations. Negative for chest pain. Gastrointestinal: Negative for abdominal pain, constipation, diarrhea, nausea and vomiting. Genitourinary: Negative for difficulty urinating and dysuria. Neurological: Negative for dizziness, weakness, light-headedness, numbness and headaches. All other systems reviewed and are negative. Physical Exam  Physical Exam  Vitals and nursing note reviewed. Constitutional:       General: He is not in acute distress. Appearance: He is well-developed. He is not diaphoretic.    HENT:      Head: Normocephalic and atraumatic. Eyes:      Pupils: Pupils are equal, round, and reactive to light. Neck:      Vascular: No JVD. Trachea: No tracheal deviation. Cardiovascular:      Rate and Rhythm: Normal rate and regular rhythm. Heart sounds: Normal heart sounds. No murmur heard. No friction rub. No gallop. Pulmonary:      Effort: Pulmonary effort is normal. No respiratory distress. Breath sounds: Normal breath sounds. No wheezing or rales. Abdominal:      General: Bowel sounds are normal. There is no distension. Palpations: Abdomen is soft. Tenderness: There is no abdominal tenderness. There is no guarding or rebound. Skin:     General: Skin is warm and dry. Coloration: Skin is not pale. Neurological:      Mental Status: He is alert and oriented to person, place, and time. Cranial Nerves: No cranial nerve deficit. Motor: No abnormal muscle tone.    Psychiatric:         Behavior: Behavior normal.         Vital Signs  ED Triage Vitals   Temperature Pulse Respirations Blood Pressure SpO2   08/16/23 1613 08/16/23 1613 08/16/23 1613 08/16/23 1613 08/16/23 1613   98 °F (36.7 °C) (!) 107 18 152/99 99 %      Temp Source Heart Rate Source Patient Position - Orthostatic VS BP Location FiO2 (%)   08/16/23 1613 08/16/23 1613 08/16/23 1613 08/16/23 1613 --   Tympanic Monitor Sitting Left arm       Pain Score       08/16/23 1915       6           Vitals:    08/16/23 1613 08/16/23 1915 08/16/23 2000   BP: 152/99 148/91 144/94   Pulse: (!) 107 95 91   Patient Position - Orthostatic VS: Sitting  Lying         Visual Acuity  Visual Acuity    Flowsheet Row Most Recent Value   L Pupil Size (mm) 3   R Pupil Size (mm) 3          ED Medications  Medications - No data to display    Diagnostic Studies  Results Reviewed     Procedure Component Value Units Date/Time    HS Troponin I 2hr [[id number]]  (Normal) Collected: 08/16/23 1921    Lab Status: Final result Specimen: Blood from Arm, Left Updated: 08/16/23 1951     hs TnI 2hr <2 ng/L      Delta 2hr hsTnI <-1 ng/L     B-Type Natriuretic Peptide(BNP) [[id number]]  (Normal) Collected: 08/16/23 1618    Lab Status: Final result Specimen: Blood from Arm, Right Updated: 08/16/23 1654     BNP 31 pg/mL     HS Troponin 0hr (reflex protocol) [[id number]]  (Normal) Collected: 08/16/23 1618    Lab Status: Final result Specimen: Blood from Arm, Right Updated: 08/16/23 1649     hs TnI 0hr 3 ng/L     Comprehensive metabolic panel [[id number]] Collected: 08/16/23 1618    Lab Status: Final result Specimen: Blood from Arm, Right Updated: 08/16/23 1640     Sodium 139 mmol/L      Potassium 4.3 mmol/L      Chloride 107 mmol/L      CO2 26 mmol/L      ANION GAP 6 mmol/L      BUN 11 mg/dL      Creatinine 0.94 mg/dL      Glucose 118 mg/dL      Calcium 9.7 mg/dL      AST 18 U/L      ALT 18 U/L      Alkaline Phosphatase 81 U/L      Total Protein 7.2 g/dL      Albumin 4.4 g/dL      Total Bilirubin 0.47 mg/dL      eGFR 89 ml/min/1.73sq m     Narrative:      [place] guidelines for Chronic Kidney Disease (CKD):   •  Stage 1 with normal or high GFR (GFR > 90 mL/min/1.73 square meters)  •  Stage 2 Mild CKD (GFR = 60-89 mL/min/1.73 square meters)  •  Stage 3A Moderate CKD (GFR = 45-59 mL/min/1.73 square meters)  •  Stage 3B Moderate CKD (GFR = 30-44 mL/min/1.73 square meters)  •  Stage 4 Severe CKD (GFR = 15-29 mL/min/1.73 square meters)  •  Stage 5 End Stage CKD (GFR <15 mL/min/1.73 square meters)  Note: GFR calculation is accurate only with a steady state creatinine    CBC and differential [[id number]] Collected: 08/16/23 1618    Lab Status: Final result Specimen: Blood from Arm, Right Updated: 08/16/23 1625     WBC 5.71 Thousand/uL      RBC 4.81 Million/uL      Hemoglobin 15.6 g/dL      Hematocrit 44.1 %      MCV 92 fL      MCH 32.4 pg      MCHC 35.4 g/dL      RDW 11.9 %      MPV 9.7 fL      Platelets 364 Thousands/uL      nRBC 0 /100 WBCs Neutrophils Relative 70 %      Immat GRANS % 0 %      Lymphocytes Relative 22 %      Monocytes Relative 7 %      Eosinophils Relative 1 %      Basophils Relative 0 %      Neutrophils Absolute 3.97 Thousands/µL      Immature Grans Absolute 0.02 Thousand/uL      Lymphocytes Absolute 1.25 Thousands/µL      Monocytes Absolute 0.39 Thousand/µL      Eosinophils Absolute 0.06 Thousand/µL      Basophils Absolute 0.02 Thousands/µL                  XR chest 2 views   Final Result by Judith Robb MD (08/16 1701)      No acute cardiopulmonary disease. Workstation performed: [id number]                    Procedures  Procedures         ED Course                               SBIRT 22yo+    Flowsheet Row Most Recent Value   Initial Alcohol Screen: US AUDIT-C     1. How often do you have a drink containing alcohol? 0 Filed at: 08/16/2023 1924   2. How many drinks containing alcohol do you have on a typical day you are drinking? 0 Filed at: 08/16/2023 1924   3a. Male UNDER 65: How often do you have five or more drinks on one occasion? 0 Filed at: 08/16/2023 1924   Audit-C Score 0 Filed at: 08/16/2023 1924   CLEMENTINE: How many times in the past year have you. .. Used an illegal drug or used a prescription medication for non-medical reasons? Never Filed at: 08/16/2023 1924                    Medical Decision Making  55-year-old male with palpitations. Patient is in normal sinus rhythm on his EKG, though has frequent PVCs on monitor. Patient is chest pain-free. Serial troponins negative. There is no ischemic changes on his EKG. No significant electrolyte or metabolic abnormalities are noted. Recommend patient follow-up promptly with outpatient cardiology follow-up, reviewed return precautions for dyspnea at rest or chest pain. Patient expresses understanding. Amount and/or Complexity of Data Reviewed  Labs: ordered. Radiology: ordered.           Disposition  Final diagnoses:   PVC's (premature ventricular contractions)     Time reflects when diagnosis was documented in both MDM as applicable and the Disposition within this note     Time User Action Codes Description Comment    8/16/2023  8:07 PM Maggy, [address] [I49.3] PVC's (premature ventricular contractions)       ED Disposition     ED Disposition   Discharge    Condition   Stable    Date/Time   Wed Aug 16, 2023  8:07 PM    Comment   Hanna Tobar [facility] discharge to home/self care. Follow-up Information     Follow up With Specialties Details Why Contact Prema Dave be sure to follow-up with your cardiologist or follow-up with the cardiology resources provided you today.               Discharge Medication List as of 8/16/2023  8:12 PM      CONTINUE these medications which have NOT CHANGED    Details   AJOVY 225 MG/1.5ML SOSY Inject 225 mg under the skin every 30 (thirty) days, Starting Mon 7/1/2019, Historical Med      amLODIPine (NORVASC) 10 mg tablet Starting Mon 4/10/2023, Historical Med      celecoxib (CeleBREX) 200 mg capsule Take 200 mg by mouth daily, Historical Med      Diclofenac Sodium 1.6 % GEL Place on the skin as needed, Historical Med      dicyclomine (BENTYL) 20 mg tablet Take 1 tablet (20 mg total) by mouth every 6 (six) hours as needed (abdominal pain), Starting Tue 3/22/2022, Normal      FIBER ADULT GUMMIES PO Take by mouth, Historical Med      Fluticasone Propionate (Xhance) 93 MCG/ACT EXHU Starting Wed 1/12/2022, Historical Med      gabapentin (NEURONTIN) 600 MG tablet TAKE 2 TABLETS BY MOUTH 3  TIMES DAILY, Normal      hydrochlorothiazide (HYDRODIURIL) 25 mg tablet Take 25 mg by mouth daily, Starting Tue 1/25/2022, Until Wed 7/12/2023, Historical Med      ibuprofen (MOTRIN) 800 mg tablet Take 1 tablet (800 mg total) by mouth every 8 (eight) hours as needed for mild pain, Starting Wed 8/9/2023, Normal      levocetirizine (XYZAL) 5 MG tablet levocetirizine 5 mg tablet   Take 1 tablet (5mg) by mouth Once Daily., Historical Med lidocaine (LIDODERM) 5 % Apply up to 3 patches q.day for 12 hours and then remove for 12 to affected area, Normal      loratadine-pseudoephedrine (CLARITIN-D 24-HOUR)  mg per 24 hr tablet Take 1 tablet by mouth daily, Historical Med      losartan (COZAAR) 100 MG tablet Take 100 mg by mouth daily , Starting Thu 10/15/2020, Historical Med      metaxalone (SKELAXIN) 800 mg tablet Take 800 mg by mouth 3 (three) times a day, Historical Med      metoclopramide (REGLAN) 5 mg tablet Take 1 tablet (5 mg total) by mouth 4 (four) times a day, Starting Tue 2/28/2023, Normal      metoprolol succinate (TOPROL-XL) 25 mg 24 hr tablet Take 25 mg by mouth daily, Starting Tue 7/12/2022, Until Wed 7/12/2023, Historical Med      montelukast (SINGULAIR) 10 mg tablet Starting Wed 2/16/2022, Historical Med      multivitamin (THERAGRAN) TABS Take 1 tablet by mouth daily, Historical Med      omeprazole (PriLOSEC) 40 MG capsule Take 1 capsule (40 mg total) by mouth 2 (two) times a day, Starting Mon 6/19/2023, Until Fri 8/18/2023, Normal      ondansetron (ZOFRAN-ODT) 4 mg disintegrating tablet TAKE 1 TABLET (4 MG TOTAL) BY MOUTH EVERY 6 (SIX) HOURS AS NEEDED FOR NAUSEA OR VOMITING, Starting Thu 6/22/2023, Normal      oxyCODONE-acetaminophen (Percocet) 5-325 mg per tablet Take 1 tablet by mouth every 6 (six) hours as needed for moderate pain Max Daily Amount: 4 tablets, Starting Wed 8/9/2023, Normal      rimegepant sulfate (Nurtec) 75 mg TBDP Nurtec ODT 75 mg disintegrating tablet   1 tablet PO daily PRN breakthrough migraine. Do not exceed 1 tablet in 24 hour period. , Historical Med      rizatriptan (MAXALT) 5 mg tablet rizatriptan 5 mg tablet   1 tablet PO at onset of severe migraine. May repeat dose after 2 hours if migraine remains.  Maximum daily dose 30mg., Historical Med      rosuvastatin (CRESTOR) 5 mg tablet Take 1 tablet by mouth daily, Starting Sun 6/30/2019, Historical Med      solifenacin (VESICARE) 5 mg tablet Take 5 mg by mouth daily, Historical Med      tamsulosin (FLOMAX) 0.4 mg Take 1 capsule (0.4 mg total) by mouth daily with dinner, Starting Wed 8/9/2023, Normal      traMADol (ULTRAM-ER) 300 MG 24 hr tablet Take 300 mg by mouth daily, Historical Med                 PDMP Review     None          ED Provider  Electronically Signed by           Hailey Walker MD  08/17/23 8613

## 2023-09-15 DIAGNOSIS — K21.9 GASTROESOPHAGEAL REFLUX DISEASE, UNSPECIFIED WHETHER ESOPHAGITIS PRESENT: ICD-10-CM

## 2023-09-15 DIAGNOSIS — K31.84 GASTROPARESIS: ICD-10-CM

## 2023-09-18 RX ORDER — ONDANSETRON 4 MG/1
4 TABLET, ORALLY DISINTEGRATING ORAL EVERY 6 HOURS PRN
Qty: 20 TABLET | Refills: 0 | Status: SHIPPED | OUTPATIENT
Start: 2023-09-18

## 2023-09-18 RX ORDER — OMEPRAZOLE 40 MG/1
40 CAPSULE, DELAYED RELEASE ORAL 2 TIMES DAILY
Qty: 180 CAPSULE | Refills: 1 | Status: SHIPPED | OUTPATIENT
Start: 2023-09-18

## 2023-11-27 RX ORDER — MELOXICAM 15 MG/1
15 TABLET ORAL DAILY
COMMUNITY

## 2023-11-27 RX ORDER — DOXYCYCLINE 100 MG/1
TABLET ORAL
COMMUNITY

## 2023-11-29 ENCOUNTER — OFFICE VISIT (OUTPATIENT)
Dept: GASTROENTEROLOGY | Facility: CLINIC | Age: 58
End: 2023-11-29
Payer: COMMERCIAL

## 2023-11-29 VITALS
HEIGHT: 72 IN | HEART RATE: 88 BPM | WEIGHT: 205.6 LBS | SYSTOLIC BLOOD PRESSURE: 140 MMHG | BODY MASS INDEX: 27.85 KG/M2 | DIASTOLIC BLOOD PRESSURE: 84 MMHG

## 2023-11-29 DIAGNOSIS — K31.84 GASTROPARESIS: ICD-10-CM

## 2023-11-29 DIAGNOSIS — K21.9 GASTROESOPHAGEAL REFLUX DISEASE, UNSPECIFIED WHETHER ESOPHAGITIS PRESENT: Primary | ICD-10-CM

## 2023-11-29 PROCEDURE — 99214 OFFICE O/P EST MOD 30 MIN: CPT | Performed by: PHYSICIAN ASSISTANT

## 2023-11-29 RX ORDER — ESOMEPRAZOLE MAGNESIUM 40 MG/1
40 CAPSULE, DELAYED RELEASE ORAL
Qty: 60 CAPSULE | Refills: 2 | Status: SHIPPED | OUTPATIENT
Start: 2023-11-29

## 2023-11-30 ENCOUNTER — TELEPHONE (OUTPATIENT)
Age: 58
End: 2023-11-30

## 2023-11-30 ENCOUNTER — TELEPHONE (OUTPATIENT)
Dept: GASTROENTEROLOGY | Facility: CLINIC | Age: 58
End: 2023-11-30

## 2023-11-30 DIAGNOSIS — G60.9 IDIOPATHIC PERIPHERAL NEUROPATHY: ICD-10-CM

## 2023-11-30 RX ORDER — ONDANSETRON 4 MG/1
4 TABLET, ORALLY DISINTEGRATING ORAL EVERY 6 HOURS PRN
Qty: 20 TABLET | Refills: 0 | Status: SHIPPED | OUTPATIENT
Start: 2023-11-30

## 2023-11-30 RX ORDER — GABAPENTIN 600 MG/1
TABLET ORAL
Qty: 540 TABLET | Refills: 3 | Status: SHIPPED | OUTPATIENT
Start: 2023-11-30

## 2023-11-30 NOTE — TELEPHONE ENCOUNTER
Called and spoke to patient. Patient stated that he does use zofran as needed so that is why he gt refill on it. Please advise. Thank you !

## 2023-11-30 NOTE — PROGRESS NOTES
[facility]  Brenda Akhtar 63 y.o. male MRN: [id number]       CC: Follow-up after EGD with Botox to the pylorus     HPI: Saverio Spurling is a pleasant 63year old male with history of hypertension, hyperlipidemia, prediabetes, spinal stenosis on Tramadol, and previous tobacco dependence. Patient presents for follow-up regarding gastroparesis. Last EGD was in July 2023 revealing 1 cm hiatal hernia, non erosive gastritis, and had Botox to the pylorus. Reports he had no symptoms for 2 months after the injection with recurrence of symptoms. Colonoscopy was normal in 2021 except for left-sided diverticulosis. He was recommended a 10-year recall. Review of Systems:    CONSTITUTIONAL: Denies any fever, chills, or rigors. Good appetite, and no recent weight loss. HEENT: No earache or tinnitus. Denies hearing loss or visual disturbances. CARDIOVASCULAR: No chest pain or palpitations. RESPIRATORY: Denies any cough, hemoptysis, shortness of breath or dyspnea on exertion. GASTROINTESTINAL: As noted in the History of Present Illness. GENITOURINARY: No problems with urination. Denies any hematuria or dysuria. NEUROLOGIC: No dizziness or vertigo, denies headaches. MUSCULOSKELETAL: Denies any muscle or joint pain. SKIN: Denies skin rashes or itching. ENDOCRINE: Denies excessive thirst. Denies intolerance to heat or cold. PSYCHOSOCIAL: Denies depression or anxiety. Denies any recent memory loss.        Current Outpatient Medications   Medication Sig Dispense Refill    AJOVY 225 MG/1.5ML SOSY Inject 225 mg under the skin every 30 (thirty) days      amLODIPine (NORVASC) 10 mg tablet       celecoxib (CeleBREX) 200 mg capsule Take 200 mg by mouth daily      Diclofenac Sodium 1.6 % GEL Place on the skin as needed      dicyclomine (BENTYL) 20 mg tablet Take 1 tablet (20 mg total) by mouth every 6 (six) hours as needed (abdominal pain) 360 tablet 1    doxycycline (ADOXA) 100 MG tablet esomeprazole (NexIUM) 40 MG capsule Take 1 capsule (40 mg total) by mouth 2 (two) times a day before meals 60 capsule 2    FIBER ADULT GUMMIES PO Take by mouth      Fluticasone Propionate (Xhance) 93 MCG/ACT EXHU       ibuprofen (MOTRIN) 800 mg tablet Take 1 tablet (800 mg total) by mouth every 8 (eight) hours as needed for mild pain 30 tablet 0    levocetirizine (XYZAL) 5 MG tablet levocetirizine 5 mg tablet   Take 1 tablet (5mg) by mouth Once Daily.      lidocaine (LIDODERM) 5 % Apply up to 3 patches q.day for 12 hours and then remove for 12 to affected area 90 patch 3    loratadine-pseudoephedrine (CLARITIN-D 24-HOUR)  mg per 24 hr tablet Take 1 tablet by mouth daily      losartan (COZAAR) 100 MG tablet Take 100 mg by mouth daily       meloxicam (MOBIC) 15 mg tablet Take 15 mg by mouth daily      metaxalone (SKELAXIN) 800 mg tablet Take 800 mg by mouth 3 (three) times a day      montelukast (SINGULAIR) 10 mg tablet       multivitamin (THERAGRAN) TABS Take 1 tablet by mouth daily      oxyCODONE-acetaminophen (Percocet) 5-325 mg per tablet Take 1 tablet by mouth every 6 (six) hours as needed for moderate pain Max Daily Amount: 4 tablets 28 tablet 0    rifaximin (Xifaxan) 550 mg tablet TAKE 1 TABLET (550 MG TOTAL) BY MOUTH EVERY 8 HOURS FOR 14 DAYS      rimegepant sulfate (Nurtec) 75 mg TBDP Nurtec ODT 75 mg disintegrating tablet   1 tablet PO daily PRN breakthrough migraine. Do not exceed 1 tablet in 24 hour period. rizatriptan (MAXALT) 5 mg tablet rizatriptan 5 mg tablet   1 tablet PO at onset of severe migraine. May repeat dose after 2 hours if migraine remains. Maximum daily dose 30mg.       rosuvastatin (CRESTOR) 5 mg tablet Take 1 tablet by mouth daily      solifenacin (VESICARE) 5 mg tablet Take 5 mg by mouth daily      tamsulosin (FLOMAX) 0.4 mg Take 1 capsule (0.4 mg total) by mouth daily with dinner 20 capsule 0    traMADol (ULTRAM-ER) 300 MG 24 hr tablet Take 300 mg by mouth daily gabapentin (NEURONTIN) 600 MG tablet TAKE 2 TABLETS BY MOUTH 3 TIMES  DAILY 540 tablet 3    hydrochlorothiazide (HYDRODIURIL) 25 mg tablet Take 25 mg by mouth daily      metoclopramide (REGLAN) 5 mg tablet Take 1 tablet (5 mg total) by mouth 4 (four) times a day 124 tablet 3    metoprolol succinate (TOPROL-XL) 25 mg 24 hr tablet Take 25 mg by mouth daily      ondansetron (ZOFRAN-ODT) 4 mg disintegrating tablet TAKE 1 TABLET (4 MG TOTAL) BY MOUTH EVERY 6 (SIX) HOURS AS NEEDED FOR NAUSEA OR VOMITING 20 tablet 0     No current facility-administered medications for this visit.      Past Medical History:   Diagnosis Date    Arthritis     Chronic pain disorder     GERD (gastroesophageal reflux disease)     Hiatal hernia     Hyperlipidemia     Hypertension      Past Surgical History:   Procedure Laterality Date    ANKLE LIGAMENT RECONSTRUCTION Right 2008    ARTHROSCOPY KNEE Left 05/06/2019    x2 prior tear 2018    KNEE ARTHROSCOPY      KNEE SURGERY Right     ROTATOR CUFF REPAIR Left 2017    4 anchors    SHOULDER SURGERY Left 05/25/2021    ULNAR NERVE TRANSPOSITION Bilateral 2010    WRIST FUSION Right 12/06/2019    WRIST SURGERY Right 2017/18     Social History     Socioeconomic History    Marital status: /Civil Union     Spouse name: None    Number of children: None    Years of education: None    Highest education level: None   Occupational History    None   Tobacco Use    Smoking status: Never    Smokeless tobacco: Never   Vaping Use    Vaping Use: Never used   Substance and Sexual Activity    Alcohol use: Not Currently     Alcohol/week: 2.0 standard drinks of alcohol     Types: 2 Cans of beer per week     Comment: as per scripts    Drug use: Never    Sexual activity: Yes     Partners: Female   Other Topics Concern    None   Social History Narrative    None     Social Determinants of Health     Financial Resource Strain: Not on file   Food Insecurity: Not on file   Transportation Needs: Not on file   Physical Activity: Not on file   Stress: Not on file   Social Connections: Not on file   Intimate Partner Violence: Not on file   Housing Stability: Not on file     Family History   Problem Relation Age of Onset    Hypertension Mother     Hypertension Father     Kidney cancer Father             PHYSICAL EXAM:    Vitals:    11/29/23 1556   BP: 140/84   BP Location: Left arm   Patient Position: Sitting   Pulse: 88   Weight: 93.3 kg (205 lb 9.6 oz)   Height: 6' (1.829 m)     General Appearance:   Alert and oriented x 3. Cooperative, and in no respiratory distress   HEENT:   Normocephalic, atraumatic, anicteric. Neck:  Supple, symmetrical, trachea midline   Lungs:   Clear to auscultation bilaterally    Heart[de-identified]   Regular rate and rhythm   Abdomen:   Soft, non-tender, non-distended; normal bowel sounds; no masses, no organomegaly    Genitalia:   Deferred    Rectal:   Deferred    Extremities:  No cyanosis, clubbing or edema    Pulses:  2+ and symmetric all extremities    Skin:  Skin color, texture, turgor normal, no rashes or lesions    Lymph nodes:  No palpable cervical or supraclavicular lymphadenopathy        Lab Results:   Results from last 6 Months   Lab Units 08/16/23  1618   WBC Thousand/uL 5.71   HEMOGLOBIN g/dL 15.6   HEMATOCRIT % 44.1   PLATELETS Thousands/uL 270   NEUTROS PCT % 70   LYMPHS PCT % 22   MONOS PCT % 7   EOS PCT % 1     Results from last 6 Months   Lab Units 08/16/23  1618   POTASSIUM mmol/L 4.3   CHLORIDE mmol/L 107   CO2 mmol/L 26   BUN mg/dL 11   CREATININE mg/dL 0.94   CALCIUM mg/dL 9.7   ALK PHOS U/L 81   ALT U/L 18   AST U/L 18         Results from last 6 Months   Lab Units 08/09/23  0521   LIPASE u/L 15       Imaging Studies:   XR chest 2 views    Result Date: 8/16/2023  Impression: No acute cardiopulmonary disease. Workstation performed: [id number]       ASSESSMENT and PLAN:      1) Gastroparesis - Patient reports significant symptom relief for 2 months after his last Botox injection.   He has trialed antiemetics, Reglan, and Xifaxan. Discussed with Dr. Zita Dunaway. Had GES off of opioids. - Schedule EGD with Botox  - Ideally, no opioids as this effects the gastric motility   - If he does respond, would refer to Dr. Colleen Connell in our network for consideration of pyloroplasty. Briefly discussed with patient during visit as Botox cannot be reoccurring for a long period of time. Follow up in 3 months. Portions of the record may have been created with voice recognition software. Occasional wrong word or "sound a like" substitutions may have occurred due to the inherent limitations of voice recognition software. Read the chart carefully and recognize, using context, where substitutions have occurred. n/a

## 2023-11-30 NOTE — TELEPHONE ENCOUNTER
Please call patient to see if he has been using this, to my knowledge I didn't think he was taking this often. Thanks!

## 2023-11-30 NOTE — TELEPHONE ENCOUNTER
----- Message from Emmanuel Flores PA-C sent at 11/30/2023  2:57 PM EST -----  Please schedule EGD with Botox with Dr. Zita Dunaway, thank you!

## 2023-11-30 NOTE — TELEPHONE ENCOUNTER
Rcvd fax  Ondansetron denied   Scanned in chart  (In PA it was even mentioned this is a continuation of therapy with a positive result)  Routing to GI office

## 2023-11-30 NOTE — TELEPHONE ENCOUNTER
Routing to [facility] was denied. Is there a alternative ? Please advise. Thank you !

## 2023-12-01 NOTE — TELEPHONE ENCOUNTER
[address] calling in for [employer]. to find out diagnosis code for ondansetron 4mg tablet- I advise it was ordered for gastroparesis code K31.84. She understood.       Ref # [id number]

## 2023-12-01 NOTE — TELEPHONE ENCOUNTER
Spoke with the patient and he scheduled his EGD. Prep instructions were given when he was in the office.     Scheduled date of EGD(as of today):1/4/24  Physician performing EGD:Stevenson  Location of EGD:Case  Instructions reviewed with patient by:Vanesa GOLDEN  Clearances:  none

## 2023-12-01 NOTE — TELEPHONE ENCOUNTER
Patients insurance called and will be faxing over some additional clinical questions that they need to be answer in order  to process claim thank you

## 2023-12-01 NOTE — TELEPHONE ENCOUNTER
PA submitted for Zofran via [facility] Key: [id number]. Clinical questions answered, chart notes sent. Awaiting determination.

## 2023-12-27 DIAGNOSIS — K21.9 GASTROESOPHAGEAL REFLUX DISEASE, UNSPECIFIED WHETHER ESOPHAGITIS PRESENT: ICD-10-CM

## 2023-12-27 DIAGNOSIS — K31.84 GASTROPARESIS: ICD-10-CM

## 2023-12-27 RX ORDER — ESOMEPRAZOLE MAGNESIUM 40 MG/1
CAPSULE, DELAYED RELEASE ORAL
Qty: 180 CAPSULE | Refills: 1 | Status: SHIPPED | OUTPATIENT
Start: 2023-12-27

## 2023-12-29 ENCOUNTER — TELEPHONE (OUTPATIENT)
Dept: NEUROLOGY | Facility: CLINIC | Age: 58
End: 2023-12-29

## 2023-12-29 NOTE — TELEPHONE ENCOUNTER
Called and LVM for pt regarding scheduling 6 month F/U w/ Dr. Marr. Offered pt 01/03/24 open slot. Expecting a call back.

## 2024-01-04 ENCOUNTER — HOSPITAL ENCOUNTER (OUTPATIENT)
Dept: GASTROENTEROLOGY | Facility: HOSPITAL | Age: 59
Setting detail: OUTPATIENT SURGERY
End: 2024-01-04
Payer: COMMERCIAL

## 2024-01-04 ENCOUNTER — ANESTHESIA EVENT (OUTPATIENT)
Dept: GASTROENTEROLOGY | Facility: HOSPITAL | Age: 59
End: 2024-01-04

## 2024-01-04 ENCOUNTER — ANESTHESIA (OUTPATIENT)
Dept: GASTROENTEROLOGY | Facility: HOSPITAL | Age: 59
End: 2024-01-04

## 2024-01-04 VITALS
WEIGHT: 210.54 LBS | DIASTOLIC BLOOD PRESSURE: 83 MMHG | HEART RATE: 76 BPM | RESPIRATION RATE: 16 BRPM | HEIGHT: 78 IN | SYSTOLIC BLOOD PRESSURE: 118 MMHG | TEMPERATURE: 97.6 F | BODY MASS INDEX: 24.36 KG/M2 | OXYGEN SATURATION: 98 %

## 2024-01-04 DIAGNOSIS — K31.84 GASTROPARESIS: ICD-10-CM

## 2024-01-04 PROCEDURE — 43236 UPPR GI SCOPE W/SUBMUC INJ: CPT | Performed by: INTERNAL MEDICINE

## 2024-01-04 RX ORDER — SODIUM CHLORIDE, SODIUM LACTATE, POTASSIUM CHLORIDE, CALCIUM CHLORIDE 600; 310; 30; 20 MG/100ML; MG/100ML; MG/100ML; MG/100ML
INJECTION, SOLUTION INTRAVENOUS CONTINUOUS PRN
Status: DISCONTINUED | OUTPATIENT
Start: 2024-01-04 | End: 2024-01-04

## 2024-01-04 RX ORDER — PROPOFOL 10 MG/ML
INJECTION, EMULSION INTRAVENOUS AS NEEDED
Status: DISCONTINUED | OUTPATIENT
Start: 2024-01-04 | End: 2024-01-04

## 2024-01-04 RX ADMIN — SODIUM CHLORIDE, SODIUM LACTATE, POTASSIUM CHLORIDE, AND CALCIUM CHLORIDE: .6; .31; .03; .02 INJECTION, SOLUTION INTRAVENOUS at 11:14

## 2024-01-04 RX ADMIN — PROPOFOL 100 MG: 10 INJECTION, EMULSION INTRAVENOUS at 11:20

## 2024-01-04 RX ADMIN — ONABOTULINUMTOXINA 100 UNITS: 100 INJECTION, POWDER, LYOPHILIZED, FOR SOLUTION INTRADERMAL; INTRAMUSCULAR at 11:22

## 2024-01-04 RX ADMIN — PROPOFOL 50 MG: 10 INJECTION, EMULSION INTRAVENOUS at 11:22

## 2024-01-04 NOTE — ANESTHESIA POSTPROCEDURE EVALUATION
Post-Op Assessment Note    CV Status:  Stable  Pain Score: 0    Pain management: adequate       Mental Status:  Sleepy   Hydration Status:  Stable   PONV Controlled:  None   Airway Patency:  Patent     Post Op Vitals Reviewed: Yes      Staff: CRNA               BP   120/72   Temp   98   Pulse  67   Resp   18   SpO2   98

## 2024-01-04 NOTE — ANESTHESIA PREPROCEDURE EVALUATION
Procedure:  EGD    Relevant Problems   ANESTHESIA (within normal limits)   (-) History of anesthesia complications      CARDIO   (+) HTN (hypertension)   (+) Hyperlipidemia   (+) Migraine   (-) Angina at rest   (-) Angina of effort   (-) Chest pain   (-) GABRIEL (dyspnea on exertion)      ENDO (within normal limits)      GI/HEPATIC  NPO confirmed  BMI 24.3   (+) Gastroesophageal reflux disease (Epigastric pain, no regurg sx)   (+) Hiatal hernia      /RENAL (within normal limits)      HEMATOLOGY (within normal limits)      MUSCULOSKELETAL   (+) Hiatal hernia      NEURO/PSYCH   (+) Migraine      PULMONARY   (+) Asthma   (-) Smoking   (-) URI (upper respiratory infection)      Allergies   Allergen Reactions    Atorvastatin      disorientation    Lisinopril Cough    Seasonal Ic  [Cholestatin]      Annotation - 06Jan2016: *HAYFEVER*     Social History     Tobacco Use    Smoking status: Never    Smokeless tobacco: Never   Vaping Use    Vaping status: Never Used   Substance Use Topics    Alcohol use: Not Currently     Alcohol/week: 2.0 standard drinks of alcohol     Types: 2 Cans of beer per week     Comment: as per scripts    Drug use: Never     Current Outpatient Medications   Medication Instructions    Ajovy 225 mg, Subcutaneous, Every 30 days    amLODIPine (NORVASC) 10 mg tablet No dose, route, or frequency recorded.    celecoxib (CELEBREX) 200 mg, Oral, Daily    Diclofenac Sodium 1.6 % GEL Transdermal, As needed    dicyclomine (BENTYL) 20 mg, Oral, Every 6 hours PRN    doxycycline (ADOXA) 100 MG tablet     esomeprazole (NexIUM) 40 MG capsule TAKE 1 CAPSULE BY MOUTH 2 TIMES A DAY BEFORE MEALS.    FIBER ADULT GUMMIES PO Oral    Fluticasone Propionate (Xhance) 93 MCG/ACT EXHU No dose, route, or frequency recorded.    gabapentin (NEURONTIN) 600 MG tablet TAKE 2 TABLETS BY MOUTH 3 TIMES  DAILY    hydrochlorothiazide (HYDRODIURIL) 25 mg, Oral, Daily    ibuprofen (MOTRIN) 800 mg, Oral, Every 8 hours PRN    levocetirizine  (XYZAL) 5 MG tablet levocetirizine 5 mg tablet   Take 1 tablet (5mg) by mouth Once Daily.    lidocaine (LIDODERM) 5 % Apply up to 3 patches q.day for 12 hours and then remove for 12 to affected area    loratadine-pseudoephedrine (CLARITIN-D 24-HOUR)  mg per 24 hr tablet 1 tablet, Oral, Daily    losartan (COZAAR) 100 mg, Oral, Daily    meloxicam (MOBIC) 15 mg, Oral, Daily    metaxalone (SKELAXIN) 800 mg, Oral, 3 times daily    metoclopramide (REGLAN) 5 mg, Oral, 4 times daily    metoprolol succinate (TOPROL-XL) 25 mg, Oral, Daily    montelukast (SINGULAIR) 10 mg tablet No dose, route, or frequency recorded.    multivitamin (THERAGRAN) TABS 1 tablet, Oral, Daily    ondansetron (ZOFRAN-ODT) 4 mg, Oral, Every 6 hours PRN    oxyCODONE-acetaminophen (Percocet) 5-325 mg per tablet 1 tablet, Oral, Every 6 hours PRN    rifaximin (Xifaxan) 550 mg tablet TAKE 1 TABLET (550 MG TOTAL) BY MOUTH EVERY 8 HOURS FOR 14 DAYS    rimegepant sulfate (Nurtec) 75 mg TBDP Nurtec ODT 75 mg disintegrating tablet   1 tablet PO daily PRN breakthrough migraine. Do not exceed 1 tablet in 24 hour period.    rizatriptan (MAXALT) 5 mg tablet rizatriptan 5 mg tablet   1 tablet PO at onset of severe migraine. May repeat dose after 2 hours if migraine remains. Maximum daily dose 30mg.    rosuvastatin (CRESTOR) 5 mg tablet 1 tablet, Oral, Daily    solifenacin (VESICARE) 5 mg, Oral, Daily    tamsulosin (FLOMAX) 0.4 mg, Oral, Daily with dinner    traMADol (ULTRAM-ER) 300 mg, Oral, Daily     Lab Results   Component Value Date    WBC 5.71 08/16/2023    HGB 15.6 08/16/2023    HCT 44.1 08/16/2023     08/16/2023    SODIUM 139 08/16/2023    K 4.3 08/16/2023     08/16/2023    CO2 26 08/16/2023    BUN 11 08/16/2023    CREATININE 0.94 08/16/2023    GLUC 118 08/16/2023    HGBA1C 5.7 (H) 10/05/2023    AST 18 08/16/2023    ALT 18 08/16/2023    ALKPHOS 81 08/16/2023    TBILI 0.47 08/16/2023    ALB 4.4 08/16/2023    PROTIME 12.2 07/02/2021    PTT 26  07/02/2021    INR 0.95 07/02/2021     Vitals:    01/04/24 1044   BP: 144/80   Pulse: 96   Resp: 16   Temp: 97.9 °F (36.6 °C)   SpO2: 99%     Echo 8/28/23- EF60-65%, normal diastolic function, normal RV, no RWMAs, no sig valvulopathies    EKG 8/16/23  Normal sinus rhythm  Normal ECG  When compared with ECG of 13-JUN-2022 21:13,  No significant change was found    Physical Exam    Airway    Mallampati score: II  TM Distance: >3 FB  Neck ROM: full     Dental       Cardiovascular  Rhythm: regular, Rate: normal    Pulmonary   Breath sounds clear to auscultation    Other Findings        Anesthesia Plan  ASA Score- 2     Anesthesia Type- IV sedation with anesthesia with ASA Monitors.         Additional Monitors:     Airway Plan:     Comment: O2 mask, natural airway, EtCO2 monitor. Risks discussed including awareness, aspiration, drug reactions and conversion to GA..       Plan Factors-Exercise tolerance (METS): >4 METS.    Chart reviewed.        Patient is not a current smoker.              Induction- intravenous.    Postoperative Plan-     Informed Consent- Anesthetic plan and risks discussed with patient.  I personally reviewed this patient with the CRNA. Discussed and agreed on the Anesthesia Plan with the CRNA..

## 2024-01-04 NOTE — H&P
"History and Physical -  [facility]  Vic Grijalva 58 y.o. male MRN: [id number]                  HPI: Vic Grijalva is a 58 y.o. year old male who presents for EGD with botulinum toxin injection of the pylorus for treatment of gastroparesis and GERD      REVIEW OF SYSTEMS: Per the HPI, and otherwise unremarkable.    Historical Information   Past Medical History:   Diagnosis Date    Arthritis     Chronic pain disorder     GERD (gastroesophageal reflux disease)     Hiatal hernia     Hyperlipidemia     Hypertension      Past Surgical History:   Procedure Laterality Date    ANKLE LIGAMENT RECONSTRUCTION Right 2008    ARTHROSCOPY KNEE Left 05/06/2019    x2 prior tear 2018    KNEE ARTHROSCOPY      KNEE SURGERY Right     ROTATOR CUFF REPAIR Left 2017    4 anchors    SHOULDER SURGERY Left 05/25/2021    ULNAR NERVE TRANSPOSITION Bilateral 2010    WRIST FUSION Right 12/06/2019    WRIST SURGERY Right 2017/18     Social History   Social History     Substance and Sexual Activity   Alcohol Use Not Currently    Alcohol/week: 2.0 standard drinks of alcohol    Types: 2 Cans of beer per week    Comment: as per scripts     Social History     Substance and Sexual Activity   Drug Use Never     Social History     Tobacco Use   Smoking Status Never   Smokeless Tobacco Never     Family History   Problem Relation Age of Onset    Hypertension Mother     Hypertension Father     Kidney cancer Father        Meds/Allergies     (Not in a hospital admission)      Allergies   Allergen Reactions    Atorvastatin      disorientation    Lisinopril Cough    Seasonal Ic  [Cholestatin]      Annotation - 06Jan2016: *HAYFEVER*       Objective     Blood pressure 144/80, pulse 96, temperature 97.9 °F (36.6 °C), temperature source Temporal, resp. rate 16, height 6' 6\" (1.981 m), weight 95.5 kg (210 lb 8.6 oz), SpO2 99%.      PHYSICAL EXAM    /80   Pulse 96   Temp 97.9 °F (36.6 °C) (Temporal)   Resp 16   Ht 6' 6\" (1.981 m) "   Wt 95.5 kg (210 lb 8.6 oz)   SpO2 99%   BMI 24.33 kg/m²       Gen: NAD  CV: RRR  CHEST: Clear  ABD: soft, NT/ND  EXT: no edema      ASSESSMENT/PLAN:  This is a 58 y.o. year old male here for egd and he is stable and optimized for his procedure.

## 2024-01-18 DIAGNOSIS — R10.9 ABDOMINAL CRAMPING: ICD-10-CM

## 2024-01-18 RX ORDER — DICYCLOMINE HCL 20 MG
20 TABLET ORAL EVERY 6 HOURS PRN
Qty: 360 TABLET | Refills: 0 | Status: SHIPPED | OUTPATIENT
Start: 2024-01-18 | End: 2024-01-19 | Stop reason: SDUPTHER

## 2024-01-19 DIAGNOSIS — K21.9 GASTROESOPHAGEAL REFLUX DISEASE WITHOUT ESOPHAGITIS: Primary | ICD-10-CM

## 2024-01-19 DIAGNOSIS — R10.9 ABDOMINAL CRAMPING: ICD-10-CM

## 2024-01-19 RX ORDER — DICYCLOMINE HCL 20 MG
20 TABLET ORAL EVERY 6 HOURS PRN
Qty: 180 TABLET | Refills: 4 | Status: SHIPPED | OUTPATIENT
Start: 2024-01-19

## 2024-01-19 RX ORDER — OMEPRAZOLE 40 MG/1
40 CAPSULE, DELAYED RELEASE ORAL 2 TIMES DAILY
Qty: 180 CAPSULE | Refills: 0 | Status: SHIPPED | OUTPATIENT
Start: 2024-01-19

## 2024-01-23 ENCOUNTER — TELEPHONE (OUTPATIENT)
Dept: NEUROLOGY | Facility: CLINIC | Age: 59
End: 2024-01-23

## 2024-01-23 NOTE — TELEPHONE ENCOUNTER
Patient requested being scheduled for a follow up appointment. Offered slots at 10am and 10:40am for tomorrow 1/24/23.LVM for patient to call me back.

## 2024-02-16 ENCOUNTER — CONSULT (OUTPATIENT)
Dept: SURGERY | Facility: CLINIC | Age: 59
End: 2024-02-16
Payer: COMMERCIAL

## 2024-02-16 DIAGNOSIS — K44.9 HIATAL HERNIA: Primary | ICD-10-CM

## 2024-02-16 DIAGNOSIS — K31.84 GASTROPARESIS: ICD-10-CM

## 2024-02-16 PROCEDURE — 99204 OFFICE O/P NEW MOD 45 MIN: CPT | Performed by: SURGERY

## 2024-02-16 NOTE — PROGRESS NOTES
Assessment/Plan:    Gastroparesis  58-year-old male with gastroparesis as well as a hiatal hernia.    Plan:  We had a long conversation in the office today regarding the pathophysiology of gastroparesis as well as hiatal hernias.  It is likely that his gastroparesis is exacerbating his reflux disease in the setting of a hiatal hernia.    I discussed with him both surgical and medical management of gastroparesis, and relative outcomes.  I do think that in patients with gastroparesis and an associated hiatal hernia, that patients have better quality of life outcomes with simultaneous repair.  As such I would like to obtain an upper GI series to evaluate gross esophageal motility as well as the size and shape of the hiatal hernia.  Following this he will return to clinic and we can discuss hiatal hernia repair with pyloroplasty.     Diagnoses and all orders for this visit:    Hiatal hernia  -     FL UPPER GI UGI; Future    Gastroparesis          Subjective:      Patient ID: Vic Grijalva is a 58 y.o. male.    58-year-old male presents with gastroparesis.  He has approximately a year history of upper GI bloating, distention, and postprandial fullness.  He underwent gastric emptying study on 1/9/2023 which I reviewed in PACS which is consistent with 81% emptying at 4 hours, consistent with gastroparesis.  He subsequently underwent EGD with Botox injection into his pylorus twice with Dr. Gamino, last on 1/4/2024.  This EGD also notes a small hiatal hernia.  He did note improvement in his symptomatology with this Botox injection, however symptoms waning over the period of 3 months.  Additionally he has a history of reflux disease on Nexium.  No significant abdominal surgical history.  Today in the office he completed the gastroparesis cardinal symptom index score.  He scored 2.61.  His nausea/vomiting score was 0.33, his early satiety score was 4, and his bloating/distention 3.5.        The following portions of the  patient's history were reviewed and updated as appropriate: He  has a past medical history of Arthritis, Chronic pain disorder, GERD (gastroesophageal reflux disease), Hiatal hernia, Hyperlipidemia, and Hypertension.  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    Ex-moderate cigarette smoker (10-19 per day) 09/26/2018    Asthma 11/02/2017    Gastroesophageal reflux disease 11/02/2017    Shoulder joint pain 08/21/2017    Cervical stenosis of spinal canal 06/29/2017    Hiatal hernia 07/25/2016    Lumbar radiculopathy 01/06/2016    Peripheral neuropathy 01/06/2016     He  has a past surgical history that includes ARTHROSCOPY KNEE (Left, 05/06/2019); Wrist surgery (Right, 2017/18); Rotator cuff repair (Left, 2017); Ulnar nerve transposition (Bilateral, 2010); Ankle ligament reconstruction (Right, 2008); Knee surgery (Right); Wrist fusion (Right, 12/06/2019); Shoulder surgery (Left, 05/25/2021); and Knee arthroscopy.  His family history includes Hypertension in his father and mother; Kidney cancer in his father.  He  reports that he has never smoked. He has never used smokeless tobacco. He reports that he does not currently use alcohol after a past usage of about 2.0 standard drinks of alcohol per week. He reports that he does not use drugs.  Current Outpatient Medications   Medication Sig Dispense Refill    AJOVY 225 MG/1.5ML SOSY Inject 225 mg under the skin every 30 (thirty) days      amLODIPine (NORVASC) 10 mg tablet       celecoxib (CeleBREX) 200 mg capsule Take 200 mg by mouth daily      Diclofenac Sodium 1.6 % GEL Place on the skin as needed      dicyclomine (BENTYL) 20 mg tablet Take 1 tablet (20 mg total) by mouth every 6 (six) hours as needed (abdominal pain) 180 tablet 4    doxycycline  (ADOXA) 100 MG tablet       esomeprazole (NexIUM) 40 MG capsule TAKE 1 CAPSULE BY MOUTH 2 TIMES A DAY BEFORE MEALS. 180 capsule 1    FIBER ADULT GUMMIES PO Take by mouth      Fluticasone Propionate (Xhance) 93 MCG/ACT EXHU       gabapentin (NEURONTIN) 600 MG tablet TAKE 2 TABLETS BY MOUTH 3 TIMES  DAILY 540 tablet 3    hydrochlorothiazide (HYDRODIURIL) 25 mg tablet Take 25 mg by mouth daily      ibuprofen (MOTRIN) 800 mg tablet Take 1 tablet (800 mg total) by mouth every 8 (eight) hours as needed for mild pain 30 tablet 0    levocetirizine (XYZAL) 5 MG tablet levocetirizine 5 mg tablet   Take 1 tablet (5mg) by mouth Once Daily.      lidocaine (LIDODERM) 5 % Apply up to 3 patches q.day for 12 hours and then remove for 12 to affected area 90 patch 3    loratadine-pseudoephedrine (CLARITIN-D 24-HOUR)  mg per 24 hr tablet Take 1 tablet by mouth daily      losartan (COZAAR) 100 MG tablet Take 100 mg by mouth daily       meloxicam (MOBIC) 15 mg tablet Take 15 mg by mouth daily      metaxalone (SKELAXIN) 800 mg tablet Take 800 mg by mouth 3 (three) times a day      metoclopramide (REGLAN) 5 mg tablet Take 1 tablet (5 mg total) by mouth 4 (four) times a day 124 tablet 3    metoprolol succinate (TOPROL-XL) 25 mg 24 hr tablet Take 25 mg by mouth daily      montelukast (SINGULAIR) 10 mg tablet       multivitamin (THERAGRAN) TABS Take 1 tablet by mouth daily      omeprazole (PriLOSEC) 40 MG capsule Take 1 capsule (40 mg total) by mouth 2 (two) times a day 180 capsule 0    ondansetron (ZOFRAN-ODT) 4 mg disintegrating tablet TAKE 1 TABLET (4 MG TOTAL) BY MOUTH EVERY 6 (SIX) HOURS AS NEEDED FOR NAUSEA OR VOMITING 20 tablet 0    oxyCODONE-acetaminophen (Percocet) 5-325 mg per tablet Take 1 tablet by mouth every 6 (six) hours as needed for moderate pain Max Daily Amount: 4 tablets 28 tablet 0    rifaximin (Xifaxan) 550 mg tablet TAKE 1 TABLET (550 MG TOTAL) BY MOUTH EVERY 8 HOURS FOR 14 DAYS      rimegepant sulfate (Nurtec) 75  mg TBDP Nurtec ODT 75 mg disintegrating tablet   1 tablet PO daily PRN breakthrough migraine. Do not exceed 1 tablet in 24 hour period.      rizatriptan (MAXALT) 5 mg tablet rizatriptan 5 mg tablet   1 tablet PO at onset of severe migraine. May repeat dose after 2 hours if migraine remains. Maximum daily dose 30mg.      rosuvastatin (CRESTOR) 5 mg tablet Take 1 tablet by mouth daily      solifenacin (VESICARE) 5 mg tablet Take 5 mg by mouth daily      tamsulosin (FLOMAX) 0.4 mg Take 1 capsule (0.4 mg total) by mouth daily with dinner 20 capsule 0    traMADol (ULTRAM-ER) 300 MG 24 hr tablet Take 300 mg by mouth daily       No current facility-administered medications for this visit.     Current Outpatient Medications on File Prior to Visit   Medication Sig    AJOVY 225 MG/1.5ML SOSY Inject 225 mg under the skin every 30 (thirty) days    amLODIPine (NORVASC) 10 mg tablet     celecoxib (CeleBREX) 200 mg capsule Take 200 mg by mouth daily    Diclofenac Sodium 1.6 % GEL Place on the skin as needed    dicyclomine (BENTYL) 20 mg tablet Take 1 tablet (20 mg total) by mouth every 6 (six) hours as needed (abdominal pain)    doxycycline (ADOXA) 100 MG tablet     esomeprazole (NexIUM) 40 MG capsule TAKE 1 CAPSULE BY MOUTH 2 TIMES A DAY BEFORE MEALS.    FIBER ADULT GUMMIES PO Take by mouth    Fluticasone Propionate (Xhance) 93 MCG/ACT EXHU     gabapentin (NEURONTIN) 600 MG tablet TAKE 2 TABLETS BY MOUTH 3 TIMES  DAILY    hydrochlorothiazide (HYDRODIURIL) 25 mg tablet Take 25 mg by mouth daily    ibuprofen (MOTRIN) 800 mg tablet Take 1 tablet (800 mg total) by mouth every 8 (eight) hours as needed for mild pain    levocetirizine (XYZAL) 5 MG tablet levocetirizine 5 mg tablet   Take 1 tablet (5mg) by mouth Once Daily.    lidocaine (LIDODERM) 5 % Apply up to 3 patches q.day for 12 hours and then remove for 12 to affected area    loratadine-pseudoephedrine (CLARITIN-D 24-HOUR)  mg per 24 hr tablet Take 1 tablet by mouth daily     losartan (COZAAR) 100 MG tablet Take 100 mg by mouth daily     meloxicam (MOBIC) 15 mg tablet Take 15 mg by mouth daily    metaxalone (SKELAXIN) 800 mg tablet Take 800 mg by mouth 3 (three) times a day    metoclopramide (REGLAN) 5 mg tablet Take 1 tablet (5 mg total) by mouth 4 (four) times a day    metoprolol succinate (TOPROL-XL) 25 mg 24 hr tablet Take 25 mg by mouth daily    montelukast (SINGULAIR) 10 mg tablet     multivitamin (THERAGRAN) TABS Take 1 tablet by mouth daily    omeprazole (PriLOSEC) 40 MG capsule Take 1 capsule (40 mg total) by mouth 2 (two) times a day    ondansetron (ZOFRAN-ODT) 4 mg disintegrating tablet TAKE 1 TABLET (4 MG TOTAL) BY MOUTH EVERY 6 (SIX) HOURS AS NEEDED FOR NAUSEA OR VOMITING    oxyCODONE-acetaminophen (Percocet) 5-325 mg per tablet Take 1 tablet by mouth every 6 (six) hours as needed for moderate pain Max Daily Amount: 4 tablets    rifaximin (Xifaxan) 550 mg tablet TAKE 1 TABLET (550 MG TOTAL) BY MOUTH EVERY 8 HOURS FOR 14 DAYS    rimegepant sulfate (Nurtec) 75 mg TBDP Nurtec ODT 75 mg disintegrating tablet   1 tablet PO daily PRN breakthrough migraine. Do not exceed 1 tablet in 24 hour period.    rizatriptan (MAXALT) 5 mg tablet rizatriptan 5 mg tablet   1 tablet PO at onset of severe migraine. May repeat dose after 2 hours if migraine remains. Maximum daily dose 30mg.    rosuvastatin (CRESTOR) 5 mg tablet Take 1 tablet by mouth daily    solifenacin (VESICARE) 5 mg tablet Take 5 mg by mouth daily    tamsulosin (FLOMAX) 0.4 mg Take 1 capsule (0.4 mg total) by mouth daily with dinner    traMADol (ULTRAM-ER) 300 MG 24 hr tablet Take 300 mg by mouth daily     No current facility-administered medications on file prior to visit.     He is allergic to atorvastatin, lisinopril, and seasonal ic  [cholestatin]..    Review of Systems   Constitutional:  Negative for appetite change, chills, diaphoresis and fever.   HENT:  Negative for nosebleeds and trouble swallowing.    Eyes:  Negative.    Respiratory:  Negative for cough, shortness of breath and wheezing.    Cardiovascular:  Negative for chest pain, palpitations and leg swelling.   Gastrointestinal:  Positive for abdominal distention and nausea. Negative for abdominal pain and vomiting.   Genitourinary:  Negative for difficulty urinating, flank pain and frequency.   Musculoskeletal:  Negative for arthralgias, joint swelling and myalgias.   Skin:  Negative for pallor and rash.   Neurological:  Negative for dizziness, facial asymmetry and speech difficulty.   Hematological:  Does not bruise/bleed easily.   Psychiatric/Behavioral:  Negative for agitation and confusion.    All other systems reviewed and are negative.        Objective:      There were no vitals taken for this visit.         Physical Exam  Vitals and nursing note reviewed.   Constitutional:       General: He is not in acute distress.     Appearance: Normal appearance. He is not toxic-appearing.   HENT:      Head: Normocephalic and atraumatic.      Mouth/Throat:      Mouth: Mucous membranes are moist.   Eyes:      Extraocular Movements: Extraocular movements intact.      Pupils: Pupils are equal, round, and reactive to light.   Cardiovascular:      Rate and Rhythm: Normal rate and regular rhythm.      Pulses: Normal pulses.   Pulmonary:      Effort: Pulmonary effort is normal. No respiratory distress.      Breath sounds: Normal breath sounds. No wheezing.   Abdominal:      General: There is distension.      Palpations: Abdomen is soft. There is no mass.      Tenderness: There is no abdominal tenderness. There is no guarding or rebound.      Hernia: No hernia is present.   Musculoskeletal:         General: No swelling or deformity. Normal range of motion.      Cervical back: Normal range of motion and neck supple.      Right lower leg: No edema.      Left lower leg: No edema.   Skin:     General: Skin is warm and dry.      Coloration: Skin is not jaundiced.   Neurological:       General: No focal deficit present.      Mental Status: He is alert and oriented to person, place, and time.   Psychiatric:         Mood and Affect: Mood normal.         Behavior: Behavior normal.

## 2024-02-16 NOTE — ASSESSMENT & PLAN NOTE
58-year-old male with gastroparesis as well as a hiatal hernia.    Plan:  We had a long conversation in the office today regarding the pathophysiology of gastroparesis as well as hiatal hernias.  It is likely that his gastroparesis is exacerbating his reflux disease in the setting of a hiatal hernia.    I discussed with him both surgical and medical management of gastroparesis, and relative outcomes.  I do think that in patients with gastroparesis and an associated hiatal hernia, that patients have better quality of life outcomes with simultaneous repair.  As such I would like to obtain an upper GI series to evaluate gross esophageal motility as well as the size and shape of the hiatal hernia.  Following this he will return to clinic and we can discuss hiatal hernia repair with pyloroplasty.

## 2024-03-15 ENCOUNTER — HOSPITAL ENCOUNTER (OUTPATIENT)
Dept: RADIOLOGY | Facility: HOSPITAL | Age: 59
End: 2024-03-15
Attending: SURGERY
Payer: COMMERCIAL

## 2024-03-15 DIAGNOSIS — K44.9 HIATAL HERNIA: ICD-10-CM

## 2024-03-15 DIAGNOSIS — K31.84 GASTROPARESIS: ICD-10-CM

## 2024-03-15 PROCEDURE — 74240 X-RAY XM UPR GI TRC 1CNTRST: CPT

## 2024-03-15 RX ORDER — ONDANSETRON 4 MG/1
4 TABLET, ORALLY DISINTEGRATING ORAL EVERY 6 HOURS PRN
Qty: 20 TABLET | Refills: 0 | Status: SHIPPED | OUTPATIENT
Start: 2024-03-15

## 2024-03-18 ENCOUNTER — TELEPHONE (OUTPATIENT)
Age: 59
End: 2024-03-18

## 2024-03-18 DIAGNOSIS — K21.9 GASTROESOPHAGEAL REFLUX DISEASE WITHOUT ESOPHAGITIS: ICD-10-CM

## 2024-03-18 DIAGNOSIS — R10.9 ABDOMINAL CRAMPING: ICD-10-CM

## 2024-03-18 RX ORDER — OMEPRAZOLE 40 MG/1
40 CAPSULE, DELAYED RELEASE ORAL 2 TIMES DAILY
Qty: 180 CAPSULE | Refills: 0 | Status: SHIPPED | OUTPATIENT
Start: 2024-03-18

## 2024-03-18 NOTE — TELEPHONE ENCOUNTER
PA for ondansetron    Submitted via    []CMM-KEY   [x]Surescripts-Case ID # Case ID:[id number]   []Faxed to plan   []Other website   []Phone call Case ID #     Office notes sent, clinical questions answered. Awaiting determination    Turnaround time for your insurance to make a decision on your Prior Authorization can take 7-21 business days.

## 2024-03-20 NOTE — TELEPHONE ENCOUNTER
PA for ondansetron denied    Reason:(Screenshot if applicable)        Message sent to office clinical pool Yes    Denial letter scanned into Media Yes    Appeal started No

## 2024-03-22 ENCOUNTER — TELEPHONE (OUTPATIENT)
Dept: SURGERY | Facility: CLINIC | Age: 59
End: 2024-03-22

## 2024-03-22 NOTE — TELEPHONE ENCOUNTER
Called Pt to reschedule Appt due to an emergency with the provider. Asked to reschedule via provider.

## 2024-04-19 DIAGNOSIS — K31.84 GASTROPARESIS: ICD-10-CM

## 2024-04-19 RX ORDER — ONDANSETRON 4 MG/1
4 TABLET, ORALLY DISINTEGRATING ORAL EVERY 6 HOURS PRN
Qty: 20 TABLET | Refills: 0 | Status: SHIPPED | OUTPATIENT
Start: 2024-04-19

## 2024-05-11 DIAGNOSIS — K21.9 GASTROESOPHAGEAL REFLUX DISEASE WITHOUT ESOPHAGITIS: ICD-10-CM

## 2024-05-11 DIAGNOSIS — R10.9 ABDOMINAL CRAMPING: ICD-10-CM

## 2024-05-13 RX ORDER — DICYCLOMINE HCL 20 MG
TABLET ORAL
Qty: 360 TABLET | Refills: 3 | Status: SHIPPED | OUTPATIENT
Start: 2024-05-13

## 2024-05-24 ENCOUNTER — OFFICE VISIT (OUTPATIENT)
Dept: SURGERY | Facility: CLINIC | Age: 59
End: 2024-05-24
Payer: COMMERCIAL

## 2024-05-24 VITALS — BODY MASS INDEX: 28.12 KG/M2 | TEMPERATURE: 97.7 F | HEIGHT: 72 IN | WEIGHT: 207.6 LBS

## 2024-05-24 DIAGNOSIS — K31.84 GASTROPARESIS: Primary | ICD-10-CM

## 2024-05-24 DIAGNOSIS — K21.9 GASTROESOPHAGEAL REFLUX DISEASE WITHOUT ESOPHAGITIS: ICD-10-CM

## 2024-05-24 PROCEDURE — 99213 OFFICE O/P EST LOW 20 MIN: CPT | Performed by: SURGERY

## 2024-05-28 NOTE — PROGRESS NOTES
Ambulatory Visit  Name: Vic Grijalva      : 1965      MRN: [id number]  Encounter Provider: Hector Gomez MD  Encounter Date: 2024   Encounter department: [facility] [place]    Assessment & Plan   1. Gastroparesis  Assessment & Plan:  58-year-old male with gastroparesis.    Plan:  I discussed at length with the patient the pathophysiology of gastroparesis, and its symptomatology.  Given that the patient has objective findings on gastric emptying study of gastroparesis, and has failed medical management we had a discussion about surgical and endoscopic procedures that can aid is symptom resolution of gastroparesis.    After lengthy discussion, we will plan to proceed with laparoscopic or robotic pyloroplasty as a first-line procedure all option for management of gastroparesis.  We discussed at length the risks and benefits of surgery including, but not limited to suture line leakage, failure of symptom improvement, and need for additional procedures.  After discussion, the patient was amenable to proceed to the operating room, and consent was signed.      2. Gastroesophageal reflux disease without esophagitis      History of Present Illness     Vic Grijalva is a 58 y.o. male who presents with gastroparesis.  Since our  last visit he underwent upper GI series on 3/15/2024 which I reviewed in PACS.  This shows no evidence of reflux, and no hiatal hernia.  He does have significant upper GI bloating, distention, and postprandial fullness.  Since his last Botox injection he has been doing quite well.    Review of Systems   Constitutional:  Negative for appetite change, chills, diaphoresis and fever.   HENT:  Negative for nosebleeds and trouble swallowing.    Eyes: Negative.    Respiratory:  Negative for cough, shortness of breath and wheezing.    Cardiovascular:  Negative for chest pain, palpitations and leg swelling.   Gastrointestinal:  Positive for abdominal distention and  nausea. Negative for abdominal pain and vomiting.   Genitourinary:  Negative for difficulty urinating, flank pain and frequency.   Musculoskeletal:  Negative for arthralgias, joint swelling and myalgias.   Skin:  Negative for pallor and rash.   Neurological:  Negative for dizziness, facial asymmetry and speech difficulty.   Hematological:  Does not bruise/bleed easily.   Psychiatric/Behavioral:  Negative for agitation and confusion.    All other systems reviewed and are negative.    Past Medical History   Past Medical History:   Diagnosis Date    Arthritis     Chronic pain disorder     GERD (gastroesophageal reflux disease)     Hiatal hernia     Hyperlipidemia     Hypertension      Past Surgical History:   Procedure Laterality Date    ANKLE LIGAMENT RECONSTRUCTION Right 2008    ARTHROSCOPY KNEE Left 05/06/2019    x2 prior tear 2018    KNEE ARTHROSCOPY      KNEE SURGERY Right     ROTATOR CUFF REPAIR Left 2017    4 anchors    SHOULDER SURGERY Left 05/25/2021    ULNAR NERVE TRANSPOSITION Bilateral 2010    WRIST FUSION Right 12/06/2019    WRIST SURGERY Right 2017/18     Family History   Problem Relation Age of Onset    Hypertension Mother     Hypertension Father     Kidney cancer Father      Current Outpatient Medications on File Prior to Visit   Medication Sig Dispense Refill    AJOVY 225 MG/1.5ML SOSY Inject 225 mg under the skin every 30 (thirty) days      amLODIPine (NORVASC) 10 mg tablet       celecoxib (CeleBREX) 200 mg capsule Take 200 mg by mouth daily      Diclofenac Sodium 1.6 % GEL Place on the skin as needed      dicyclomine (BENTYL) 20 mg tablet TAKE 1 TABLET (20 MG TOTAL) BY MOUTH EVERY 6 HOURS AS NEEDED FOR ABDOMINAL PAIN 360 tablet 3    FIBER ADULT GUMMIES PO Take by mouth      Fluticasone Propionate (Xhance) 93 MCG/ACT EXHU       gabapentin (NEURONTIN) 600 MG tablet TAKE 2 TABLETS BY MOUTH 3 TIMES  DAILY 540 tablet 3    levocetirizine (XYZAL) 5 MG tablet levocetirizine 5 mg tablet   Take 1 tablet (5mg)  by mouth Once Daily.      lidocaine (LIDODERM) 5 % Apply up to 3 patches q.day for 12 hours and then remove for 12 to affected area 90 patch 3    loratadine-pseudoephedrine (CLARITIN-D 24-HOUR)  mg per 24 hr tablet Take 1 tablet by mouth daily      losartan (COZAAR) 100 MG tablet Take 100 mg by mouth daily       metaxalone (SKELAXIN) 800 mg tablet Take 800 mg by mouth 3 (three) times a day      montelukast (SINGULAIR) 10 mg tablet       multivitamin (THERAGRAN) TABS Take 1 tablet by mouth daily      omeprazole (PriLOSEC) 40 MG capsule Take 1 capsule (40 mg total) by mouth 2 (two) times a day 180 capsule 0    ondansetron (ZOFRAN-ODT) 4 mg disintegrating tablet TAKE 1 TABLET (4 MG TOTAL) BY MOUTH EVERY 6 (SIX) HOURS AS NEEDED FOR NAUSEA OR VOMITING 20 tablet 0    rimegepant sulfate (Nurtec) 75 mg TBDP Nurtec ODT 75 mg disintegrating tablet   1 tablet PO daily PRN breakthrough migraine. Do not exceed 1 tablet in 24 hour period.      rizatriptan (MAXALT) 5 mg tablet rizatriptan 5 mg tablet   1 tablet PO at onset of severe migraine. May repeat dose after 2 hours if migraine remains. Maximum daily dose 30mg.      rosuvastatin (CRESTOR) 5 mg tablet Take 1 tablet by mouth daily      solifenacin (VESICARE) 5 mg tablet Take 5 mg by mouth daily      traMADol (ULTRAM-ER) 300 MG 24 hr tablet Take 300 mg by mouth daily      doxycycline (ADOXA) 100 MG tablet       esomeprazole (NexIUM) 40 MG capsule TAKE 1 CAPSULE BY MOUTH 2 TIMES A DAY BEFORE MEALS. 180 capsule 1    hydrochlorothiazide (HYDRODIURIL) 25 mg tablet Take 25 mg by mouth daily      ibuprofen (MOTRIN) 800 mg tablet Take 1 tablet (800 mg total) by mouth every 8 (eight) hours as needed for mild pain 30 tablet 0    meloxicam (MOBIC) 15 mg tablet Take 15 mg by mouth daily      metoclopramide (REGLAN) 5 mg tablet Take 1 tablet (5 mg total) by mouth 4 (four) times a day 124 tablet 3    metoprolol succinate (TOPROL-XL) 25 mg 24 hr tablet Take 25 mg by mouth daily       oxyCODONE-acetaminophen (Percocet) 5-325 mg per tablet Take 1 tablet by mouth every 6 (six) hours as needed for moderate pain Max Daily Amount: 4 tablets 28 tablet 0    rifaximin (Xifaxan) 550 mg tablet TAKE 1 TABLET (550 MG TOTAL) BY MOUTH EVERY 8 HOURS FOR 14 DAYS      tamsulosin (FLOMAX) 0.4 mg Take 1 capsule (0.4 mg total) by mouth daily with dinner 20 capsule 0     No current facility-administered medications on file prior to visit.     Allergies   Allergen Reactions    Atorvastatin      disorientation    Lisinopril Cough    Seasonal Ic  [Cholestatin]      Annotation - 06Jan2016: *HAYFEVER*      Current Outpatient Medications on File Prior to Visit   Medication Sig Dispense Refill    AJOVY 225 MG/1.5ML SOSY Inject 225 mg under the skin every 30 (thirty) days      amLODIPine (NORVASC) 10 mg tablet       celecoxib (CeleBREX) 200 mg capsule Take 200 mg by mouth daily      Diclofenac Sodium 1.6 % GEL Place on the skin as needed      dicyclomine (BENTYL) 20 mg tablet TAKE 1 TABLET (20 MG TOTAL) BY MOUTH EVERY 6 HOURS AS NEEDED FOR ABDOMINAL PAIN 360 tablet 3    FIBER ADULT GUMMIES PO Take by mouth      Fluticasone Propionate (Xhance) 93 MCG/ACT EXHU       gabapentin (NEURONTIN) 600 MG tablet TAKE 2 TABLETS BY MOUTH 3 TIMES  DAILY 540 tablet 3    levocetirizine (XYZAL) 5 MG tablet levocetirizine 5 mg tablet   Take 1 tablet (5mg) by mouth Once Daily.      lidocaine (LIDODERM) 5 % Apply up to 3 patches q.day for 12 hours and then remove for 12 to affected area 90 patch 3    loratadine-pseudoephedrine (CLARITIN-D 24-HOUR)  mg per 24 hr tablet Take 1 tablet by mouth daily      losartan (COZAAR) 100 MG tablet Take 100 mg by mouth daily       metaxalone (SKELAXIN) 800 mg tablet Take 800 mg by mouth 3 (three) times a day      montelukast (SINGULAIR) 10 mg tablet       multivitamin (THERAGRAN) TABS Take 1 tablet by mouth daily      omeprazole (PriLOSEC) 40 MG capsule Take 1 capsule (40 mg total) by mouth 2 (two) times  a day 180 capsule 0    ondansetron (ZOFRAN-ODT) 4 mg disintegrating tablet TAKE 1 TABLET (4 MG TOTAL) BY MOUTH EVERY 6 (SIX) HOURS AS NEEDED FOR NAUSEA OR VOMITING 20 tablet 0    rimegepant sulfate (Nurtec) 75 mg TBDP Nurtec ODT 75 mg disintegrating tablet   1 tablet PO daily PRN breakthrough migraine. Do not exceed 1 tablet in 24 hour period.      rizatriptan (MAXALT) 5 mg tablet rizatriptan 5 mg tablet   1 tablet PO at onset of severe migraine. May repeat dose after 2 hours if migraine remains. Maximum daily dose 30mg.      rosuvastatin (CRESTOR) 5 mg tablet Take 1 tablet by mouth daily      solifenacin (VESICARE) 5 mg tablet Take 5 mg by mouth daily      traMADol (ULTRAM-ER) 300 MG 24 hr tablet Take 300 mg by mouth daily      doxycycline (ADOXA) 100 MG tablet       esomeprazole (NexIUM) 40 MG capsule TAKE 1 CAPSULE BY MOUTH 2 TIMES A DAY BEFORE MEALS. 180 capsule 1    hydrochlorothiazide (HYDRODIURIL) 25 mg tablet Take 25 mg by mouth daily      ibuprofen (MOTRIN) 800 mg tablet Take 1 tablet (800 mg total) by mouth every 8 (eight) hours as needed for mild pain 30 tablet 0    meloxicam (MOBIC) 15 mg tablet Take 15 mg by mouth daily      metoclopramide (REGLAN) 5 mg tablet Take 1 tablet (5 mg total) by mouth 4 (four) times a day 124 tablet 3    metoprolol succinate (TOPROL-XL) 25 mg 24 hr tablet Take 25 mg by mouth daily      oxyCODONE-acetaminophen (Percocet) 5-325 mg per tablet Take 1 tablet by mouth every 6 (six) hours as needed for moderate pain Max Daily Amount: 4 tablets 28 tablet 0    rifaximin (Xifaxan) 550 mg tablet TAKE 1 TABLET (550 MG TOTAL) BY MOUTH EVERY 8 HOURS FOR 14 DAYS      tamsulosin (FLOMAX) 0.4 mg Take 1 capsule (0.4 mg total) by mouth daily with dinner 20 capsule 0     No current facility-administered medications on file prior to visit.      Social History     Tobacco Use    Smoking status: Never    Smokeless tobacco: Never   Vaping Use    Vaping status: Never Used   Substance and Sexual  Activity    Alcohol use: Not Currently     Alcohol/week: 2.0 standard drinks of alcohol     Types: 2 Cans of beer per week     Comment: as per scripts    Drug use: Never    Sexual activity: Yes     Partners: Female     Objective     Temp 97.7 °F (36.5 °C) (Temporal)   Ht 6' (1.829 m)   Wt 94.2 kg (207 lb 9.6 oz)   BMI 28.16 kg/m²     Physical Exam  Vitals and nursing note reviewed.   Constitutional:       General: He is not in acute distress.     Appearance: Normal appearance. He is not ill-appearing.   HENT:      Head: Normocephalic and atraumatic.      Mouth/Throat:      Mouth: Mucous membranes are moist.      Pharynx: Oropharynx is clear.   Eyes:      Extraocular Movements: Extraocular movements intact.   Pulmonary:      Effort: Pulmonary effort is normal. No respiratory distress.      Breath sounds: No stridor. No wheezing.   Abdominal:      General: There is no distension.      Palpations: Abdomen is soft. There is no mass.      Tenderness: There is no abdominal tenderness.      Hernia: No hernia is present.   Musculoskeletal:         General: No swelling or tenderness. Normal range of motion.      Cervical back: Neck supple.   Skin:     General: Skin is warm and dry.   Neurological:      General: No focal deficit present.      Mental Status: He is alert and oriented to person, place, and time. Mental status is at baseline.   Psychiatric:         Mood and Affect: Mood normal.         Behavior: Behavior normal.       Administrative Statements

## 2024-05-28 NOTE — ASSESSMENT & PLAN NOTE
58-year-old male with gastroparesis.    Plan:  I discussed at length with the patient the pathophysiology of gastroparesis, and its symptomatology.  Given that the patient has objective findings on gastric emptying study of gastroparesis, and has failed medical management we had a discussion about surgical and endoscopic procedures that can aid is symptom resolution of gastroparesis.    After lengthy discussion, we will plan to proceed with laparoscopic or robotic pyloroplasty as a first-line procedure all option for management of gastroparesis.  We discussed at length the risks and benefits of surgery including, but not limited to suture line leakage, failure of symptom improvement, and need for additional procedures.  After discussion, the patient was amenable to proceed to the operating room, and consent was signed.

## 2024-07-20 DIAGNOSIS — K21.9 GASTROESOPHAGEAL REFLUX DISEASE WITHOUT ESOPHAGITIS: ICD-10-CM

## 2024-07-20 DIAGNOSIS — R10.9 ABDOMINAL CRAMPING: ICD-10-CM

## 2024-07-20 RX ORDER — OMEPRAZOLE 40 MG/1
40 CAPSULE, DELAYED RELEASE ORAL 2 TIMES DAILY
Qty: 200 CAPSULE | Refills: 1 | Status: SHIPPED | OUTPATIENT
Start: 2024-07-20

## 2024-07-26 ENCOUNTER — TELEPHONE (OUTPATIENT)
Age: 59
End: 2024-07-26

## 2024-10-21 ENCOUNTER — TELEPHONE (OUTPATIENT)
Age: 59
End: 2024-10-21

## 2024-10-21 DIAGNOSIS — G60.9 IDIOPATHIC PERIPHERAL NEUROPATHY: ICD-10-CM

## 2024-10-21 RX ORDER — GABAPENTIN 600 MG/1
1200 TABLET ORAL 3 TIMES DAILY
Qty: 540 TABLET | Refills: 0 | Status: CANCELLED | OUTPATIENT
Start: 2024-10-21

## 2024-10-21 NOTE — TELEPHONE ENCOUNTER
Patient called the RX Refill Line. Message is being forwarded to the office.     Patient is requesting a message be sent to the office regarding gabapentin (NEURONTIN) 600 MG tablet with one year supply. Patient would like medication sent to [facility] - [address] [[id number]]  Patient would like office to call him to schedule any upcoming appointment needed, as he would now need a new Provider.     Please contact patient at [phone]

## 2024-10-22 ENCOUNTER — TELEPHONE (OUTPATIENT)
Dept: SURGERY | Facility: CLINIC | Age: 59
End: 2024-10-22

## 2024-10-23 ENCOUNTER — TELEPHONE (OUTPATIENT)
Dept: NEUROLOGY | Facility: CLINIC | Age: 59
End: 2024-10-23

## 2024-11-01 DIAGNOSIS — G60.9 IDIOPATHIC PERIPHERAL NEUROPATHY: ICD-10-CM

## 2024-11-01 RX ORDER — GABAPENTIN 600 MG/1
1200 TABLET ORAL 3 TIMES DAILY
Qty: 540 TABLET | Refills: 0 | Status: SHIPPED | OUTPATIENT
Start: 2024-11-01

## 2024-11-01 NOTE — TELEPHONE ENCOUNTER
Reason for call:   [x] Refill   [] Prior Auth  [x] Other: not a dup    Office:   [] PCP/Provider -   [x] Specialty/Provider -   gabapentin (NEURONTIN) 600 MG tablet     Quantity: 90    [facility]    Does the patient have enough for 3 days?   [x] Yes   [] No - Send as HP to POD

## 2024-12-24 DIAGNOSIS — G60.9 IDIOPATHIC PERIPHERAL NEUROPATHY: ICD-10-CM

## 2024-12-24 RX ORDER — GABAPENTIN 600 MG/1
1200 TABLET ORAL 3 TIMES DAILY
Qty: 540 TABLET | Refills: 0 | Status: SHIPPED | OUTPATIENT
Start: 2024-12-24

## 2025-01-12 DIAGNOSIS — R10.9 ABDOMINAL CRAMPING: ICD-10-CM

## 2025-01-12 DIAGNOSIS — K21.9 GASTROESOPHAGEAL REFLUX DISEASE WITHOUT ESOPHAGITIS: ICD-10-CM

## 2025-01-14 RX ORDER — OMEPRAZOLE 40 MG/1
40 CAPSULE, DELAYED RELEASE ORAL 2 TIMES DAILY
Qty: 180 CAPSULE | Refills: 2 | Status: SHIPPED | OUTPATIENT
Start: 2025-01-14

## 2025-01-15 NOTE — TELEPHONE ENCOUNTER
Chart reviewed  Dr Blanc sent 90 day supply to [facility] on 12/24/24    Called [facility], spoke to Danica and states that Gabapentin was shipped on 1/13/25. Nothing is needed from our office.

## 2025-02-05 DIAGNOSIS — G60.9 IDIOPATHIC PERIPHERAL NEUROPATHY: ICD-10-CM

## 2025-02-06 DIAGNOSIS — G60.9 IDIOPATHIC PERIPHERAL NEUROPATHY: ICD-10-CM

## 2025-02-06 RX ORDER — GABAPENTIN 600 MG/1
1200 TABLET ORAL 3 TIMES DAILY
Qty: 540 TABLET | Refills: 3 | OUTPATIENT
Start: 2025-02-06

## 2025-02-06 NOTE — TELEPHONE ENCOUNTER
Medication:   gabapentin (NEURONTIN) 600 MG tablet     Dose/Frequency: TAKE 2 TABLETS BY MOUTH 3 TIMES  DAILY       Quantity: 540 tablet     Pharmacy: [facility] [id number] [address]     Office:   [] PCP/Provider -   [x] Speciality/Provider -     Does the patient have enough for 3 days?   [] Yes   [x] No - Send as HP to POD

## 2025-02-07 RX ORDER — GABAPENTIN 600 MG/1
1200 TABLET ORAL 3 TIMES DAILY
Qty: 540 TABLET | Refills: 3 | Status: SHIPPED | OUTPATIENT
Start: 2025-02-07

## 2025-04-10 ENCOUNTER — OFFICE VISIT (OUTPATIENT)
Age: 60
End: 2025-04-10
Payer: COMMERCIAL

## 2025-04-10 VITALS
OXYGEN SATURATION: 98 % | WEIGHT: 208.8 LBS | SYSTOLIC BLOOD PRESSURE: 142 MMHG | BODY MASS INDEX: 27.67 KG/M2 | HEIGHT: 73 IN | DIASTOLIC BLOOD PRESSURE: 84 MMHG | HEART RATE: 91 BPM | TEMPERATURE: 98 F

## 2025-04-10 DIAGNOSIS — D22.9 MULTIPLE NEVI: Primary | ICD-10-CM

## 2025-04-10 DIAGNOSIS — L82.1 SEBORRHEIC KERATOSIS: ICD-10-CM

## 2025-04-10 DIAGNOSIS — D18.01 CHERRY ANGIOMA: ICD-10-CM

## 2025-04-10 PROCEDURE — 99203 OFFICE O/P NEW LOW 30 MIN: CPT | Performed by: STUDENT IN AN ORGANIZED HEALTH CARE EDUCATION/TRAINING PROGRAM

## 2025-04-10 NOTE — PROGRESS NOTES
"[facility] Note     Patient Name: Vic Grijalva  Encounter Date: 04/10/2025     Have you been cared for by a [facility] Dermatologist in the last 3 years and, if so, which description applies to you?    NO.   I am considered a \"new\" patient and must complete all patient intake questions. I am MALE/not capable of bearing children.    REVIEW OF SYSTEMS:  Have you recently had or currently have any of the following? Recent fever or chills? No  Any non-healing wound? No   PAST MEDICAL HISTORY:  Have you personally ever had or currently have any of the following?  If \"YES,\" then please provide more detail. Skin cancer (such as Melanoma, Basal Cell Carcinoma, Squamous Cell Carcinoma?  No  Tuberculosis, HIV/AIDS, Hepatitis B or C: No  Radiation Treatment No   HISTORY OF IMMUNOSUPPRESSION:   Do you have a history of any of the following:  Systemic Immunosuppression such as Diabetes, Biologic or Immunotherapy, Chemotherapy, Organ Transplantation, Bone Marrow Transplantation or Prednisone?  No     Answering \"YES\" requires the addition of the dotphrase \"IMMUNOSUPPRESSED\" as the first diagnosis of the patient's visit.   FAMILY HISTORY:  Any \"first degree relatives\" (parent, brother, sister, or child) with the following?    Skin Cancer, Pancreatic or Other Cancer? YES, Father - prostate cancer and Unknown skin cancer.    PATIENT EXPERIENCE:    Do you want the Dermatologist to perform a COMPLETE skin exam today including a clinical examination under the \"bra and underwear\" areas?  NO  If necessary, do we have your permission to call and leave a detailed message on your Preferred Phone number that includes your specific medical information?  Yes      Allergies   Allergen Reactions   • Atorvastatin      disorientation   • Lisinopril Cough   • Seasonal Ic  [Cholestatin]      [facility] - 06Jan2016: *HAYFEVER*      Current Outpatient Medications:   •  AJOVY 225 MG/1.5ML SOSY, Inject 225 mg under the skin every " 30 (thirty) days, Disp: , Rfl:   •  amLODIPine (NORVASC) 10 mg tablet, , Disp: , Rfl:   •  celecoxib (CeleBREX) 200 mg capsule, Take 200 mg by mouth daily, Disp: , Rfl:   •  Diclofenac Sodium 1.6 % GEL, Place on the skin as needed, Disp: , Rfl:   •  dicyclomine (BENTYL) 20 mg tablet, TAKE 1 TABLET (20 MG TOTAL) BY MOUTH EVERY 6 HOURS AS NEEDED FOR ABDOMINAL PAIN, Disp: 360 tablet, Rfl: 3  •  FIBER ADULT GUMMIES PO, Take by mouth, Disp: , Rfl:   •  Fluticasone Propionate (Xhance) 93 MCG/ACT EXHU, , Disp: , Rfl:   •  gabapentin (NEURONTIN) 600 MG tablet, TAKE 2 TABLETS BY MOUTH 3 TIMES  DAILY, Disp: 540 tablet, Rfl: 3  •  hydrochlorothiazide (HYDRODIURIL) 25 mg tablet, Take 25 mg by mouth daily, Disp: , Rfl:   •  levocetirizine (XYZAL) 5 MG tablet, levocetirizine 5 mg tablet  Take 1 tablet (5mg) by mouth Once Daily., Disp: , Rfl:   •  lidocaine (LIDODERM) 5 %, Apply up to 3 patches q.day for 12 hours and then remove for 12 to affected area, Disp: 90 patch, Rfl: 3  •  loratadine-pseudoephedrine (CLARITIN-D 24-HOUR)  mg per 24 hr tablet, Take 1 tablet by mouth daily, Disp: , Rfl:   •  losartan (COZAAR) 100 MG tablet, Take 100 mg by mouth daily , Disp: , Rfl:   •  metaxalone (SKELAXIN) 800 mg tablet, Take 800 mg by mouth 3 (three) times a day, Disp: , Rfl:   •  montelukast (SINGULAIR) 10 mg tablet, , Disp: , Rfl:   •  multivitamin (THERAGRAN) TABS, Take 1 tablet by mouth daily, Disp: , Rfl:   •  omeprazole (PriLOSEC) 40 MG capsule, TAKE 1 CAPSULE BY MOUTH TWICE A DAY, Disp: 180 capsule, Rfl: 2  •  ondansetron (ZOFRAN-ODT) 4 mg disintegrating tablet, TAKE 1 TABLET (4 MG TOTAL) BY MOUTH EVERY 6 (SIX) HOURS AS NEEDED FOR NAUSEA OR VOMITING, Disp: 20 tablet, Rfl: 0  •  rimegepant sulfate (Nurtec) 75 mg TBDP, Nurtec ODT 75 mg disintegrating tablet  1 tablet PO daily PRN breakthrough migraine. Do not exceed 1 tablet in 24 hour period., Disp: , Rfl:   •  rizatriptan (MAXALT) 5 mg tablet, rizatriptan 5 mg tablet  1 tablet  "PO at onset of severe migraine. May repeat dose after 2 hours if migraine remains. Maximum daily dose 30mg., Disp: , Rfl:   •  rosuvastatin (CRESTOR) 5 mg tablet, Take 1 tablet by mouth daily, Disp: , Rfl:   •  solifenacin (VESICARE) 5 mg tablet, Take 5 mg by mouth daily, Disp: , Rfl:   •  traMADol (ULTRAM-ER) 300 MG 24 hr tablet, Take 300 mg by mouth daily, Disp: , Rfl:   •  doxycycline (ADOXA) 100 MG tablet, , Disp: , Rfl:   •  esomeprazole (NexIUM) 40 MG capsule, TAKE 1 CAPSULE BY MOUTH 2 TIMES A DAY BEFORE MEALS., Disp: 180 capsule, Rfl: 1  •  ibuprofen (MOTRIN) 800 mg tablet, Take 1 tablet (800 mg total) by mouth every 8 (eight) hours as needed for mild pain, Disp: 30 tablet, Rfl: 0  •  meloxicam (MOBIC) 15 mg tablet, Take 15 mg by mouth daily, Disp: , Rfl:   •  metoclopramide (REGLAN) 5 mg tablet, Take 1 tablet (5 mg total) by mouth 4 (four) times a day, Disp: 124 tablet, Rfl: 3  •  metoprolol succinate (TOPROL-XL) 25 mg 24 hr tablet, Take 25 mg by mouth daily, Disp: , Rfl:   •  oxyCODONE-acetaminophen (Percocet) 5-325 mg per tablet, Take 1 tablet by mouth every 6 (six) hours as needed for moderate pain Max Daily Amount: 4 tablets, Disp: 28 tablet, Rfl: 0  •  rifaximin (Xifaxan) 550 mg tablet, TAKE 1 TABLET (550 MG TOTAL) BY MOUTH EVERY 8 HOURS FOR 14 DAYS, Disp: , Rfl:   •  tamsulosin (FLOMAX) 0.4 mg, Take 1 capsule (0.4 mg total) by mouth daily with dinner, Disp: 20 capsule, Rfl: 0          Whom besides the patient is providing clinical information about today's encounter?   NO ADDITIONAL HISTORIAN (patient alone provided history)    Physical Exam and Assessment/Plan by Diagnosis:    MELANOCYTIC NEVI (\"Moles\")    Physical Exam:  Anatomic Location Affected: Mostly on sun-exposed areas of the back.  Morphological Description:  Scattered, 1-4mm round to ovoid, symmetrical-appearing, even bordered, skin colored to dark brown macules/papules, mostly in sun-exposed areas  Pertinent Positives:  Pertinent " "Negatives:    Additional History of Present Condition:  Present on exam.     Assessment and Plan:  Based on a thorough discussion of this condition and the management approach to it (including a comprehensive discussion of the known risks, side effects and potential benefits of treatment), the patient (family) agrees to implement the following specific plan:  Provided handout with information regarding the ABCDE's of moles   Recommend routine skin exams every year as needed.    Sun avoidance, protective clothing (known as UPF clothing), and the use of at least SPF 30 sunscreens is advised. Sunscreen should be reapplied every two hours when outside.     SEBORRHEIC KERATOSIS; NON-INFLAMED    Physical Exam:  Anatomic Location Affected:  scattered across back.   Morphological Description:  Flat and raised, waxy, smooth to warty textured, yellow to brownish-grey to dark brown to blackish, discrete, \"stuck-on\" appearing papules.  Pertinent Positives:  Pertinent Negatives:    Additional History of Present Condition:  Patient reports new bumps on the skin.  Denies itch, burn, pain, bleeding or ulceration.  Present constantly; nothing seems to make it worse or better.  No prior treatment.      Assessment and Plan:  Based on a thorough discussion of this condition and the management approach to it (including a comprehensive discussion of the known risks, side effects and potential benefits of treatment), the patient (family) agrees to implement the following specific plan:  Reassured benign    ANGIOMA (\"CHERRY ANGIOMA\")    Physical Exam:  Anatomic Location: scattered across sun exposed areas of the back.   Morphologic Description: Firm red to reddish-blue discrete papules  Pertinent Positives:  Pertinent Negatives:    Additional History of Present Condition:  Present on exam.     Assessment and Plan:  Reassured benign    Scribe Attestation    I,:  Roopa Poe MA am acting as a scribe while in the presence of the attending " physician.:       I,:  Jesus Gmaboa DO personally performed the services described in this documentation    as scribed in my presence.:

## 2025-04-10 NOTE — PATIENT INSTRUCTIONS
"MELANOCYTIC NEVI (\"Moles\")    Assessment and Plan:  Based on a thorough discussion of this condition and the management approach to it (including a comprehensive discussion of the known risks, side effects and potential benefits of treatment), the patient (family) agrees to implement the following specific plan:  Provided handout with information regarding the ABCDE's of moles   Recommend routine skin exams every year as needed.    Sun avoidance, protective clothing (known as UPF clothing), and the use of at least SPF 30 sunscreens is advised. Sunscreen should be reapplied every two hours when outside.     SEBORRHEIC KERATOSIS; NON-INFLAMED    Assessment and Plan:  Based on a thorough discussion of this condition and the management approach to it (including a comprehensive discussion of the known risks, side effects and potential benefits of treatment), the patient (family) agrees to implement the following specific plan:  Reassured benign    ANGIOMA (\"CHERRY ANGIOMA\")      Assessment and Plan:  Reassured benign  "

## 2025-06-02 DIAGNOSIS — R10.9 ABDOMINAL CRAMPING: ICD-10-CM

## 2025-06-02 DIAGNOSIS — K21.9 GASTROESOPHAGEAL REFLUX DISEASE WITHOUT ESOPHAGITIS: ICD-10-CM

## 2025-06-03 RX ORDER — DICYCLOMINE HCL 20 MG
20 TABLET ORAL EVERY 6 HOURS
Qty: 360 TABLET | Refills: 0 | Status: SHIPPED | OUTPATIENT
Start: 2025-06-03

## 2025-08-13 ENCOUNTER — OFFICE VISIT (OUTPATIENT)
Dept: NEUROLOGY | Facility: CLINIC | Age: 60
End: 2025-08-13
Payer: COMMERCIAL